# Patient Record
Sex: FEMALE | Race: WHITE | NOT HISPANIC OR LATINO | Employment: OTHER | URBAN - METROPOLITAN AREA
[De-identification: names, ages, dates, MRNs, and addresses within clinical notes are randomized per-mention and may not be internally consistent; named-entity substitution may affect disease eponyms.]

---

## 2017-04-10 ENCOUNTER — HOSPITAL ENCOUNTER (OUTPATIENT)
Dept: MAMMOGRAPHY | Facility: HOSPITAL | Age: 72
Discharge: HOME | End: 2017-04-10
Attending: INTERNAL MEDICINE

## 2017-04-10 DIAGNOSIS — Z12.31 SCREENING MAMMOGRAM FOR HIGH-RISK PATIENT: ICD-10-CM

## 2017-04-27 ENCOUNTER — RECORDS - HEALTHEAST (OUTPATIENT)
Dept: BONE DENSITY | Facility: CLINIC | Age: 72
End: 2017-04-27

## 2017-04-27 ENCOUNTER — RECORDS - HEALTHEAST (OUTPATIENT)
Dept: ADMINISTRATIVE | Facility: OTHER | Age: 72
End: 2017-04-27

## 2017-04-27 DIAGNOSIS — M81.0 AGE-RELATED OSTEOPOROSIS WITHOUT CURRENT PATHOLOGICAL FRACTURE: ICD-10-CM

## 2017-04-28 ENCOUNTER — OFFICE VISIT - HEALTHEAST (OUTPATIENT)
Dept: INTERNAL MEDICINE | Facility: CLINIC | Age: 72
End: 2017-04-28

## 2017-04-28 DIAGNOSIS — M81.0 SENILE OSTEOPOROSIS: ICD-10-CM

## 2017-04-28 RX ORDER — AMOXICILLIN 500 MG/1
CAPSULE ORAL
Refills: 8 | Status: SHIPPED | COMMUNITY
Start: 2017-02-08

## 2017-10-30 ENCOUNTER — AMBULATORY - HEALTHEAST (OUTPATIENT)
Dept: NURSING | Facility: CLINIC | Age: 72
End: 2017-10-30

## 2017-10-30 DIAGNOSIS — M81.0 SENILE OSTEOPOROSIS: ICD-10-CM

## 2018-05-11 ENCOUNTER — OFFICE VISIT - HEALTHEAST (OUTPATIENT)
Dept: INTERNAL MEDICINE | Facility: CLINIC | Age: 73
End: 2018-05-11

## 2018-05-11 DIAGNOSIS — M81.0 SENILE OSTEOPOROSIS: ICD-10-CM

## 2018-05-11 LAB — CALCIUM SERPL-MCNC: 10.3 MG/DL (ref 8.5–10.5)

## 2018-05-14 ENCOUNTER — COMMUNICATION - HEALTHEAST (OUTPATIENT)
Dept: INTERNAL MEDICINE | Facility: CLINIC | Age: 73
End: 2018-05-14

## 2018-05-14 LAB — 25(OH)D3 SERPL-MCNC: 84.4 NG/ML (ref 30–80)

## 2018-08-28 ENCOUNTER — RECORDS - HEALTHEAST (OUTPATIENT)
Dept: LAB | Facility: CLINIC | Age: 73
End: 2018-08-28

## 2018-08-28 LAB
ALBUMIN SERPL-MCNC: 4 G/DL (ref 3.5–5)
ALP SERPL-CCNC: 68 U/L (ref 45–120)
ALT SERPL W P-5'-P-CCNC: 15 U/L (ref 0–45)
ANION GAP SERPL CALCULATED.3IONS-SCNC: 12 MMOL/L (ref 5–18)
AST SERPL W P-5'-P-CCNC: 26 U/L (ref 0–40)
BILIRUB SERPL-MCNC: 0.4 MG/DL (ref 0–1)
BUN SERPL-MCNC: 18 MG/DL (ref 8–28)
CALCIUM SERPL-MCNC: 10.6 MG/DL (ref 8.5–10.5)
CHLORIDE BLD-SCNC: 104 MMOL/L (ref 98–107)
CHOLEST SERPL-MCNC: 194 MG/DL
CO2 SERPL-SCNC: 25 MMOL/L (ref 22–31)
CREAT SERPL-MCNC: 0.8 MG/DL (ref 0.6–1.1)
FASTING STATUS PATIENT QL REPORTED: NO
GFR SERPL CREATININE-BSD FRML MDRD: >60 ML/MIN/1.73M2
GLUCOSE BLD-MCNC: 97 MG/DL (ref 70–125)
HDLC SERPL-MCNC: 55 MG/DL
LDLC SERPL CALC-MCNC: 115 MG/DL
LIPASE SERPL-CCNC: 36 U/L (ref 0–52)
POTASSIUM BLD-SCNC: 4.5 MMOL/L (ref 3.5–5)
PROT SERPL-MCNC: 7.4 G/DL (ref 6–8)
SODIUM SERPL-SCNC: 141 MMOL/L (ref 136–145)
TRIGL SERPL-MCNC: 120 MG/DL

## 2018-10-18 ENCOUNTER — COMMUNICATION - HEALTHEAST (OUTPATIENT)
Dept: TELEHEALTH | Facility: CLINIC | Age: 73
End: 2018-10-18

## 2018-10-18 ENCOUNTER — HOSPITAL ENCOUNTER (OUTPATIENT)
Dept: MAMMOGRAPHY | Facility: CLINIC | Age: 73
Discharge: HOME | End: 2018-10-18

## 2018-10-18 DIAGNOSIS — Z12.31 VISIT FOR SCREENING MAMMOGRAM: ICD-10-CM

## 2018-11-06 ENCOUNTER — COMMUNICATION - HEALTHEAST (OUTPATIENT)
Dept: INTERNAL MEDICINE | Facility: CLINIC | Age: 73
End: 2018-11-06

## 2018-11-12 ENCOUNTER — AMBULATORY - HEALTHEAST (OUTPATIENT)
Dept: NURSING | Facility: CLINIC | Age: 73
End: 2018-11-12

## 2019-05-14 ENCOUNTER — RECORDS - HEALTHEAST (OUTPATIENT)
Dept: BONE DENSITY | Facility: CLINIC | Age: 74
End: 2019-05-14

## 2019-05-14 ENCOUNTER — OFFICE VISIT - HEALTHEAST (OUTPATIENT)
Dept: INTERNAL MEDICINE | Facility: CLINIC | Age: 74
End: 2019-05-14

## 2019-05-14 ENCOUNTER — RECORDS - HEALTHEAST (OUTPATIENT)
Dept: ADMINISTRATIVE | Facility: OTHER | Age: 74
End: 2019-05-14

## 2019-05-14 DIAGNOSIS — M81.0 SENILE OSTEOPOROSIS: ICD-10-CM

## 2019-05-14 DIAGNOSIS — M81.0 AGE-RELATED OSTEOPOROSIS WITHOUT CURRENT PATHOLOGICAL FRACTURE: ICD-10-CM

## 2019-05-14 LAB
ANION GAP SERPL CALCULATED.3IONS-SCNC: 12 MMOL/L (ref 5–18)
BUN SERPL-MCNC: 21 MG/DL (ref 8–28)
CALCIUM SERPL-MCNC: 10.4 MG/DL (ref 8.5–10.5)
CHLORIDE BLD-SCNC: 102 MMOL/L (ref 98–107)
CO2 SERPL-SCNC: 25 MMOL/L (ref 22–31)
CREAT SERPL-MCNC: 0.86 MG/DL (ref 0.6–1.1)
GFR SERPL CREATININE-BSD FRML MDRD: >60 ML/MIN/1.73M2
GLUCOSE BLD-MCNC: 77 MG/DL (ref 70–125)
POTASSIUM BLD-SCNC: 4.1 MMOL/L (ref 3.5–5)
SODIUM SERPL-SCNC: 139 MMOL/L (ref 136–145)

## 2019-05-15 LAB — 25(OH)D3 SERPL-MCNC: 65 NG/ML (ref 30–80)

## 2019-06-28 ENCOUNTER — RECORDS - HEALTHEAST (OUTPATIENT)
Dept: LAB | Facility: CLINIC | Age: 74
End: 2019-06-28

## 2019-07-01 LAB — HEPATITIS B SURFACE ANTIBODY LHE- HISTORICAL: NEGATIVE

## 2019-07-03 ENCOUNTER — COMMUNICATION - HEALTHEAST (OUTPATIENT)
Dept: ADMINISTRATIVE | Facility: CLINIC | Age: 74
End: 2019-07-03

## 2019-11-12 ENCOUNTER — AMBULATORY - HEALTHEAST (OUTPATIENT)
Dept: INTERNAL MEDICINE | Facility: CLINIC | Age: 74
End: 2019-11-12

## 2019-11-12 DIAGNOSIS — Z92.29 PERSONAL HISTORY OF OTHER DRUG THERAPY: ICD-10-CM

## 2019-11-12 DIAGNOSIS — M81.0 SENILE OSTEOPOROSIS: ICD-10-CM

## 2019-11-14 ENCOUNTER — AMBULATORY - HEALTHEAST (OUTPATIENT)
Dept: NURSING | Facility: CLINIC | Age: 74
End: 2019-11-14

## 2020-01-23 ENCOUNTER — RECORDS - HEALTHEAST (OUTPATIENT)
Dept: ADMINISTRATIVE | Facility: OTHER | Age: 75
End: 2020-01-23

## 2020-01-26 ENCOUNTER — HOSPITAL ENCOUNTER (OUTPATIENT)
Dept: MRI IMAGING | Facility: CLINIC | Age: 75
Discharge: HOME | End: 2020-01-26
Attending: INTERNAL MEDICINE

## 2020-01-26 DIAGNOSIS — R10.84 GENERALIZED ABDOMINAL PAIN: ICD-10-CM

## 2020-01-26 LAB
CREAT BLD-MCNC: 1 MG/DL (ref 0.6–1.1)
GFR SERPL CREATININE-BSD FRML MDRD: 54 ML/MIN/1.73M2

## 2020-05-15 ENCOUNTER — AMBULATORY - HEALTHEAST (OUTPATIENT)
Dept: NURSING | Facility: CLINIC | Age: 75
End: 2020-05-15

## 2020-11-13 ENCOUNTER — AMBULATORY - HEALTHEAST (OUTPATIENT)
Dept: INTERNAL MEDICINE | Facility: CLINIC | Age: 75
End: 2020-11-13

## 2020-11-17 ENCOUNTER — OFFICE VISIT - HEALTHEAST (OUTPATIENT)
Dept: INTERNAL MEDICINE | Facility: CLINIC | Age: 75
End: 2020-11-17

## 2020-11-17 DIAGNOSIS — M81.0 SENILE OSTEOPOROSIS: ICD-10-CM

## 2020-11-17 DIAGNOSIS — M65.30 TRIGGER FINGER, ACQUIRED: ICD-10-CM

## 2020-11-17 RX ORDER — CITALOPRAM HYDROBROMIDE 20 MG/1
20 TABLET ORAL DAILY
Status: SHIPPED | COMMUNITY
Start: 2020-09-29

## 2020-11-17 RX ORDER — LORAZEPAM 1 MG/1
1 TABLET ORAL EVERY 6 HOURS PRN
Status: SHIPPED | COMMUNITY
Start: 2020-11-17 | End: 2022-11-22

## 2021-01-19 ENCOUNTER — RECORDS - HEALTHEAST (OUTPATIENT)
Dept: LAB | Facility: CLINIC | Age: 76
End: 2021-01-19

## 2021-01-19 LAB
ALBUMIN SERPL-MCNC: 4 G/DL (ref 3.5–5)
ALP SERPL-CCNC: 66 U/L (ref 45–120)
ALT SERPL W P-5'-P-CCNC: 17 U/L (ref 0–45)
ANION GAP SERPL CALCULATED.3IONS-SCNC: 9 MMOL/L (ref 5–18)
AST SERPL W P-5'-P-CCNC: 25 U/L (ref 0–40)
BILIRUB SERPL-MCNC: 0.4 MG/DL (ref 0–1)
BUN SERPL-MCNC: 18 MG/DL (ref 8–28)
CALCIUM SERPL-MCNC: 9.5 MG/DL (ref 8.5–10.5)
CHLORIDE BLD-SCNC: 104 MMOL/L (ref 98–107)
CHOLEST SERPL-MCNC: 184 MG/DL
CO2 SERPL-SCNC: 29 MMOL/L (ref 22–31)
CREAT SERPL-MCNC: 0.92 MG/DL (ref 0.6–1.1)
FASTING STATUS PATIENT QL REPORTED: YES
GFR SERPL CREATININE-BSD FRML MDRD: 60 ML/MIN/1.73M2
GLUCOSE BLD-MCNC: 97 MG/DL (ref 70–125)
HDLC SERPL-MCNC: 59 MG/DL
LDLC SERPL CALC-MCNC: 107 MG/DL
POTASSIUM BLD-SCNC: 4.6 MMOL/L (ref 3.5–5)
PROT SERPL-MCNC: 6.9 G/DL (ref 6–8)
SODIUM SERPL-SCNC: 142 MMOL/L (ref 136–145)
TRIGL SERPL-MCNC: 91 MG/DL

## 2021-05-17 ENCOUNTER — AMBULATORY - HEALTHEAST (OUTPATIENT)
Dept: NURSING | Facility: CLINIC | Age: 76
End: 2021-05-17

## 2021-05-17 ENCOUNTER — RECORDS - HEALTHEAST (OUTPATIENT)
Dept: BONE DENSITY | Facility: CLINIC | Age: 76
End: 2021-05-17

## 2021-05-17 ENCOUNTER — RECORDS - HEALTHEAST (OUTPATIENT)
Dept: ADMINISTRATIVE | Facility: OTHER | Age: 76
End: 2021-05-17

## 2021-05-17 DIAGNOSIS — M81.0 AGE-RELATED OSTEOPOROSIS WITHOUT CURRENT PATHOLOGICAL FRACTURE: ICD-10-CM

## 2021-05-28 NOTE — PATIENT INSTRUCTIONS - HE
Prolia 11th today.  Prolia 12th in 6 months. I will see you in 1 year.    DXA in 2 years .   Phone number to schedule [phone].    Daily calcium need is 4933-5113 mg a day from the diet and supplements.  Calcium citrate is easier to digest.  Vitamin D 2000 IU daily recommended.

## 2021-05-28 NOTE — PROGRESS NOTES
1. Osteoporosis Senile  Vitamin D, Total (25-Hydroxy)    Basic Metabolic Panel     Patient was educated on safety of Prolia utilizing Patient Counseling Chart for Healthcare Providers, as outlined by the Prolia REMS progam.     Return in about 6 months (around 11/14/2019) for Recheck, Prolia injection.    Patient Instructions   Prolia 11th today.  Prolia 12th in 6 months. I will see you in 1 year.    DXA in 2 years .   Phone number to schedule [phone].    Daily calcium need is 9749-3400 mg a day from the diet and supplements.  Calcium citrate is easier to digest.  Vitamin D 2000 IU daily recommended.      Chief Complaint   Patient presents with     Osteoporosis Follow Up       /70   Pulse 72   Wt 138 lb 8 oz (62.8 kg)   BMI 22.70 kg/m        Did you experience any problems with previous Prolia injection? no  Any medication change in the last 6 months? no  Did you take prednisone or other immunosupressant drugs in the last 6 months   (chemo, transplant, rheum, dermatology conditions)? no  Did you have any serious infection in the last 6 months?no  Any recent hospitalizations?no  Do you plan any dental work in the next 2-3 months?no  How much calcium do you take daily from the diet and supplements?1200 mg  How much vit D do you take daily? 2000 IU  Last DXA? Done today,  Reviewed and discussed      Patient is here today for the 11th Prolia injection. Patient tolerated previous injections well.   We discussed calcium and vit D daily needs today.   Next Prolia injection will be in 6 months.     15 minutes spent with the patient and more then 50 % of the time in counseling.  This note has been dictated using voice recognition software. Any grammatical or context distortions are unintentional and inherent to the software      Patient Active Problem List   Diagnosis     Benign Essential Hypertension     Osteoporosis Senile     Osteoarthritis of right knee     Depression     Anxiety     GERD  (gastroesophageal reflux disease)       Current Outpatient Medications on File Prior to Visit   Medication Sig Dispense Refill     acetaminophen (TYLENOL) 500 MG tablet Take 1,000 mg by mouth every 6 (six) hours as needed for pain.        amoxicillin (AMOXIL) 500 MG capsule TAKE 4 CAPSULES BY MOUTH 1 HOUR BEFORE DENTAL APPOINTMENT.  8     CALCIUM CITRATE/VITAMIN D3 (CALCIUM CITRATE + D ORAL) Take by mouth. Calcium 630 mg and Vit D3-500 IU. Two tabs a day       chlorpheniramine-phenylephrine 4-10 mg per tablet Take 1 tablet by mouth every 6 (six) hours as needed for congestion.       cholecalciferol, vitamin D3, (VITAMIN D3) 2,000 unit Tab Take 4,000 Units by mouth daily.        GLUCOSAM SUL NA/CHONDR JOSHUA A NA (GLUCOSAMINE-CHONDROITIN 3X ORAL) Take 1 tablet by mouth daily.        multivitamin (MULTIVITAMIN) per tablet Take 1 tablet by mouth daily.        [DISCONTINUED] LORazepam (ATIVAN) 1 MG tablet        [DISCONTINUED] omeprazole (PRILOSEC) 20 MG capsule Take 20 mg by mouth daily.        [DISCONTINUED] PARoxetine (PAXIL) 30 MG tablet        Current Facility-Administered Medications on File Prior to Visit   Medication Dose Route Frequency Provider Last Rate Last Dose     denosumab 60 mg (PROLIA 60 mg/ml)  60 mg Subcutaneous Q6 Months Melanie Crisostomo MD   60 mg at 04/26/16 0942     [DISCONTINUED] denosumab 60 mg (PROLIA 60 mg/ml)  60 mg Subcutaneous Q6 Months Timmy Ndiaye MD   60 mg at 11/12/18 0958

## 2021-05-29 ENCOUNTER — RECORDS - HEALTHEAST (OUTPATIENT)
Dept: ADMINISTRATIVE | Facility: CLINIC | Age: 76
End: 2021-05-29

## 2021-05-30 ENCOUNTER — RECORDS - HEALTHEAST (OUTPATIENT)
Dept: ADMINISTRATIVE | Facility: CLINIC | Age: 76
End: 2021-05-30

## 2021-05-30 VITALS — BODY MASS INDEX: 22.29 KG/M2 | WEIGHT: 136 LBS

## 2021-05-30 NOTE — TELEPHONE ENCOUNTER
Patient would like to apply for the Prolia assistance program.  She was on it and then her insurance changed to [employer] and they told her she had to reapply.

## 2021-05-30 NOTE — TELEPHONE ENCOUNTER
Called patient and informed her that medicare dose not qualify for the co-pay program. Patient understood.       Sissy Woodward CMA  4:37 PM  7/3/2019

## 2021-05-31 ENCOUNTER — RECORDS - HEALTHEAST (OUTPATIENT)
Dept: ADMINISTRATIVE | Facility: CLINIC | Age: 76
End: 2021-05-31

## 2021-06-01 ENCOUNTER — RECORDS - HEALTHEAST (OUTPATIENT)
Dept: ADMINISTRATIVE | Facility: CLINIC | Age: 76
End: 2021-06-01

## 2021-06-01 VITALS — WEIGHT: 138.6 LBS | BODY MASS INDEX: 22.71 KG/M2

## 2021-06-02 ENCOUNTER — RECORDS - HEALTHEAST (OUTPATIENT)
Dept: ADMINISTRATIVE | Facility: CLINIC | Age: 76
End: 2021-06-02

## 2021-06-03 VITALS — BODY MASS INDEX: 22.7 KG/M2 | WEIGHT: 138.5 LBS

## 2021-06-03 NOTE — PROGRESS NOTES
"Prolia Injection Phone Screen      Screening questions have been asked 2-3 days prior to administration visit for Prolia. If any questions are answered with \"Yes,\" this phone encounter were will routed to ordering provider for further evaluation.     1.  When was the last injection?  5/14/19    2.  Has insurance for this injection been verified?  Yes    3.  Did you experience any new onset achiness or rashes that lasted for over a month with your previous Prolia injection?   No    4.  Do you have a fever over 101?F or a new deep cough that is unusual for you today? No    5.  Have you started any new medications in the last 6 months that you were told could affect your immune system? These may have been prescribed by oncologist, transplant, rheumatology, or dermatology.   No    6.  In the last 6 months have you have gastric bypass or parathyroid surgery?   No    7.  Do you plan dental work requiring drilling into the bone such as implants/extractions or oral surgery in the next 2-3 months?   No    8. Do you have new insurance since the last injection? No    Patient informed if symptoms discussed above present prior to their administration appointment, they are to notify clinic immediately.     Lissa Puri              "

## 2021-06-05 VITALS
WEIGHT: 140.7 LBS | OXYGEN SATURATION: 97 % | SYSTOLIC BLOOD PRESSURE: 142 MMHG | DIASTOLIC BLOOD PRESSURE: 79 MMHG | HEART RATE: 99 BPM | BODY MASS INDEX: 23.06 KG/M2

## 2021-06-08 NOTE — PROGRESS NOTES
"Prolia Injection Phone Screen      Screening questions have been asked 2-3 days prior to administration visit for Prolia. If any questions are answered with \"Yes,\" this phone encounter were will routed to ordering provider for further evaluation.     1.  When was the last injection?  11/14/19    2.  Has insurance for this injection been verified?  Yes    3.  Did you experience any new onset achiness or rashes that lasted for over a month with your previous Prolia injection?   No    4.  Do you have a fever over 101?F or a new deep cough that is unusual for you today? No    5.  Have you started any new medications in the last 6 months that you were told could affect your immune system? These may have been prescribed by oncologist, transplant, rheumatology, or dermatology.   No    6.  In the last 6 months have you have gastric bypass or parathyroid surgery?   No    7.  Do you plan dental work requiring drilling into the bone such as implants/extractions or oral surgery in the next 2-3 months?   No    8. Do you have new insurance since the last injection?    Patient informed if symptoms discussed above present prior to their administration appointment, they are to notify clinic immediately.     Lissa Puri    The following steps were completed to comply with the REMS program for Prolia:  1. Ordering provider has previously reviewed information in the Medication Guide and Patient Counseling Chart, including the serious risks of Prolia  and the symptoms of each risk and have been advised to seek prompt medical attention if they have signs or symptoms of any of the serious risks.  2. Provided each patient a copy of the Medication Guide and Patient Brochure.  See MAR for administration details.   Indication: Prolia  (denosumab) is a prescription medicine used to treat osteoporosis in patients who:   Are at high risk for fracture, meaning patients who have had a fracture related to osteoporosis, or who have multiple risk " factors for fracture; Cannot use another osteoporosis medicine or other osteoporosis medicines did not work well.   The timeline for early/late injections would be 4 weeks early and any time after the 6 month christian. If a patient receives their injection late, then the subsequent injection would be 6 months from the date that they actually received the injection    Have the screening questions been asked prior to this administration? Yes    Patient consents to receive outdoor care: Yes    Upon arrival, patient instructed to proceed to designated location, place vehicle in park, turn off, and remove keys  and Patient receiving an immunization or injection. Instructed patient to notify healthcare personnel if they are having an adverse reaction.    If we are unable to safely and ergonomically able to provide care- is the patient able to safely able to get out of car and transfer to a chair? Yes    Patient would like to receive their AVS not needed ..

## 2021-06-10 NOTE — PROGRESS NOTES
1. Osteoporosis Senile  denosumab 60 mg (PROLIA 60 mg/ml)       Return in about 6 months (around 10/28/2017) for Recheck.    Patient Instructions   Prolia 7th today.  Prolia 8th in 6 months with my nurse. I will see you in 1 year.  DXA in 2 years.   Phone number to schedule [phone].  Please avoid any extensive dental work as implants and teeth extractions for the next 1-2 months.  Daily calcium need is 1316-8771 mg a day from the diet and supplements.  Calcium citrate is easier to digest.  Vitamin D 2000 IU daily recommended.      Chief Complaint   Patient presents with     Osteoporosis Follow Up       /70  Pulse 88  Wt 136 lb (61.7 kg)  BMI 22.29 kg/m2      Did you experience any problems with previous Prolia injection? no  Any medication change in the last 6 months? no  Did you take prednisone or other immunosupressant drugs in the last 6 months   (chemo, transplant, rheum, dermatology conditions)? no  Did you have any serious infection in the last 6 months?no  Any recent hospitalizations?no  Do you plan any dental work in the next 2-3 months?no  How much calcium do you take daily from the diet and supplements?1200 mg  How much vit D do you take daily? 2000 IU  Last DXA? 4/28/17      Patient is here today for the 7th Prolia injection. Patient tolerated previous injections well.   We discussed calcium and vit D daily needs today.   Next Prolia injection will be in 6 months.     15 minutes spent with the patient and more then 50 % of the time in counseling.  This note has been dictated using voice recognition software. Any grammatical or context distortions are unintentional and inherent to the software      Patient Active Problem List   Diagnosis     Benign Essential Hypertension     Osteoporosis Senile     Osteoarthritis of right knee     Depression     Anxiety     GERD (gastroesophageal reflux disease)       Current Outpatient Prescriptions on File Prior to Visit   Medication Sig Dispense Refill      acetaminophen (TYLENOL) 500 MG tablet Take 1,000 mg by mouth every 6 (six) hours as needed for pain.        chlorpheniramine-phenylephrine 4-10 mg per tablet Take 1 tablet by mouth every 6 (six) hours as needed for congestion.       cholecalciferol, vitamin D3, (VITAMIN D3) 2,000 unit Tab Take 4,000 Units by mouth daily.        GLUCOSAM SUL NA/CHONDR JOSHUA A NA (GLUCOSAMINE-CHONDROITIN 3X ORAL) Take 1 tablet by mouth daily.        LORazepam (ATIVAN) 1 MG tablet        multivitamin (MULTIVITAMIN) per tablet Take 1 tablet by mouth daily.        omeprazole (PRILOSEC) 20 MG capsule Take 20 mg by mouth daily.        PARoxetine (PAXIL) 30 MG tablet        Current Facility-Administered Medications on File Prior to Visit   Medication Dose Route Frequency Provider Last Rate Last Dose     denosumab 60 mg (PROLIA 60 mg/ml)  60 mg Subcutaneous Q6 Months Melanie Crisostomo MD   60 mg at 04/26/16 0942

## 2021-06-13 NOTE — PROGRESS NOTES
Chief Complaint   Patient presents with     Prolia #8     1. Did you experience any problems with previous Prolia injection? NO  2. Do you feel sick today?(fever, RS, GI,  issues)? NO  3. Any medication changes in the last 6 months? NO  4. Did you take prednisone or other immunosuppressant drugs in the last 6 months?(chemo, transplant, rheu, dermatology conditions)? NO  5. Did you have any serious infection in the last 6 months? (pancreatitis) NO  6. Any recent hospitalizations/ surgeries (especially gastric bypass, thyroid, parathyroid)? NO  7. Do you plan any dental work?(especially implants and extractions) in the next 2-3 months? NO  8. Did you have any fractures in the last year? NO    Yearly F/U appointment  with Dr. Ndiaye is made.     Shasha Patel CMA [facility] 10/30/2017 8:32 AM

## 2021-06-13 NOTE — PROGRESS NOTES
"  1. Osteoporosis Senile  DXA Bone Density Scan   2. Trigger finger, acquired       Patient was educated on safety of Prolia utilizing Patient Counseling Chart for Healthcare Providers, as outlined by the Prolia REMS progam.     Return in about 6 months (around 5/17/2021) for Recheck.    Patient Instructions   Prolia 14th today.  Prolia 15th in 6 months with my nurse.I will see you in 1 year.    DXA in 5/2021 .   Phone number to schedule [phone].    Daily calcium need is 4577-2346 mg a day from the diet and supplements.  Calcium citrate is easier to digest.  Vitamin D 2000 IU daily recommended.      Chief Complaint   Patient presents with     Osteoporosis Follow Up     Osteo F/U       /79   Pulse 99   Wt 140 lb 11.2 oz (63.8 kg)   SpO2 97%   BMI 23.06 kg/m        Did you experience any problems with previous Prolia injection? no  Any medication change in the last 6 months? no  Did you take prednisone or other immunosupressant drugs in the last 6 months   (chemo, transplant, rheum, dermatology conditions)? no  Did you have any serious infection in the last 6 months?no  Any recent hospitalizations?no  Do you plan any dental work in the next 2-3 months?no  How much calcium do you take daily from the diet and supplements?1200 mg  How much vit D do you take daily? 2000 IU  Last DXA? 5/2019, Reviewed and discussed      Patient is here today for the 14th Prolia injection. Patient tolerated previous injections well.   We discussed calcium and vit D daily needs today.  I reviewed her labs from 5/2019.  She also asked about swelling and tightness in her right hand, mostly in her fingers with frequent catching when flex. On the exam, there is no redness and swelling comparing to left hand, but has [place] nodules bilat.   We discussed trigger finger symptoms and treatment. Since it is not \"bad\", she will wait before discuss it with Hand specialist.  Next Prolia injection will be in 6 months.     Patient was " educated on safety of Prolia utilizing Patient Counseling Chart for Healthcare Providers, as outlined by the Prolia REMS progam.     25 minutes spent with the patient and more then 50 % of the time in counseling about osteoporosis, available osteoporosis treatment options, medication side effects and contraindications, supplement use and weightbearing exercise.    This note has been dictated using voice recognition software. Any grammatical or context distortions are unintentional and inherent to the software      Patient Active Problem List   Diagnosis     Benign Essential Hypertension     Osteoporosis Senile     Osteoarthritis of right knee     Depression     Anxiety     GERD (gastroesophageal reflux disease)       Current Outpatient Medications on File Prior to Visit   Medication Sig Dispense Refill     acetaminophen (TYLENOL) 500 MG tablet Take 1,000 mg by mouth every 6 (six) hours as needed for pain.        amoxicillin (AMOXIL) 500 MG capsule TAKE 4 CAPSULES BY MOUTH 1 HOUR BEFORE DENTAL APPOINTMENT.  8     CALCIUM CITRATE/VITAMIN D3 (CALCIUM CITRATE + D ORAL) Take by mouth. Calcium 630 mg and Vit D3-500 IU. Two tabs a day       chlorpheniramine-phenylephrine 4-10 mg per tablet Take 1 tablet by mouth every 6 (six) hours as needed for congestion.       cholecalciferol, vitamin D3, (VITAMIN D3) 2,000 unit Tab Take 4,000 Units by mouth daily.        citalopram (CELEXA) 20 MG tablet Take 20 mg by mouth daily.       LORazepam (ATIVAN) 1 MG tablet Take 1 mg by mouth every 6 (six) hours as needed for anxiety.       multivitamin (MULTIVITAMIN) per tablet Take 1 tablet by mouth daily.        omeprazole (PRILOSEC) 20 MG capsule Take 20 mg by mouth daily before breakfast.       GLUCOSAM SUL NA/CHONDR JOSHUA A NA (GLUCOSAMINE-CHONDROITIN 3X ORAL) Take 1 tablet by mouth daily.        Current Facility-Administered Medications on File Prior to Visit   Medication Dose Route Frequency Provider Last Rate Last Dose     denosumab 60  mg (PROLIA 60 mg/ml)  60 mg Subcutaneous Q6 Months Melanie Crisostomo MD   60 mg at 05/14/19 1613     denosumab 60 mg (PROLIA 60 mg/ml)  60 mg Subcutaneous Q6 Months Timmy Ndiaye MD   60 mg at 05/15/20 1259

## 2021-06-13 NOTE — PATIENT INSTRUCTIONS - HE
Prolia 14th today.  Prolia 15th in 6 months with my nurse.I will see you in 1 year.    DXA in 5/2021 .   Phone number to schedule [phone].    Daily calcium need is 5962-9250 mg a day from the diet and supplements.  Calcium citrate is easier to digest.  Vitamin D 2000 IU daily recommended.

## 2021-06-18 NOTE — PROGRESS NOTES
1. Osteoporosis Senile  Calcium    Vitamin D, Total (25-Hydroxy)    DXA Bone Density Scan       Return in about 6 months (around 11/11/2018) for Recheck.    Patient Instructions   Prolia 9th today.  Prolia 10th in 6 months with my nurse. I will see you in 1 year.    DXA in 5/2019 .   Phone number to schedule [phone].    Daily calcium need is 6830-3577 mg a day from the diet and supplements.  Calcium citrate is easier to digest.  Vitamin D 2000 IU daily recommended.      Chief Complaint   Patient presents with     Osteoporosis Follow Up       /68  Pulse 72  Wt 138 lb 9.6 oz (62.9 kg)  BMI 22.71 kg/m2      Did you experience any problems with previous Prolia injection? no  Any medication change in the last 6 months? no  Did you take prednisone or other immunosupressant drugs in the last 6 months   (chemo, transplant, rheum, dermatology conditions)? no  Did you have any serious infection in the last 6 months?no  Any recent hospitalizations?no  Do you plan any dental work in the next 2-3 months?no  How much calcium do you take daily from the diet and supplements?1200 mg - 1500 mg  How much vit D do you take daily? 4000 IU  Last DXA? 4/2017      Patient is here today for the 9th Prolia injection. Patient tolerated previous injections well.   We discussed calcium and vit D daily needs today.   Next Prolia injection will be in 6 months.     15 minutes spent with the patient and more then 50 % of the time in counseling.  This note has been dictated using voice recognition software. Any grammatical or context distortions are unintentional and inherent to the software      Patient Active Problem List   Diagnosis     Benign Essential Hypertension     Osteoporosis Senile     Osteoarthritis of right knee     Depression     Anxiety     GERD (gastroesophageal reflux disease)       Current Outpatient Prescriptions on File Prior to Visit   Medication Sig Dispense Refill     acetaminophen (TYLENOL) 500 MG tablet Take  1,000 mg by mouth every 6 (six) hours as needed for pain.        amoxicillin (AMOXIL) 500 MG capsule TAKE 4 CAPSULES BY MOUTH 1 HOUR BEFORE DENTAL APPOINTMENT.  8     CALCIUM CITRATE/VITAMIN D3 (CALCIUM CITRATE + D ORAL) Take by mouth. Calcium 630 mg and Vit D3-500 IU. Two tabs a day       chlorpheniramine-phenylephrine 4-10 mg per tablet Take 1 tablet by mouth every 6 (six) hours as needed for congestion.       cholecalciferol, vitamin D3, (VITAMIN D3) 2,000 unit Tab Take 4,000 Units by mouth daily.        GLUCOSAM SUL NA/CHONDR JOSHUA A NA (GLUCOSAMINE-CHONDROITIN 3X ORAL) Take 1 tablet by mouth daily.        LORazepam (ATIVAN) 1 MG tablet        multivitamin (MULTIVITAMIN) per tablet Take 1 tablet by mouth daily.        omeprazole (PRILOSEC) 20 MG capsule Take 20 mg by mouth daily.        PARoxetine (PAXIL) 30 MG tablet        Current Facility-Administered Medications on File Prior to Visit   Medication Dose Route Frequency Provider Last Rate Last Dose     denosumab 60 mg (PROLIA 60 mg/ml)  60 mg Subcutaneous Q6 Months Melanie Crisostomo MD   60 mg at 04/26/16 0955

## 2021-06-21 NOTE — PROGRESS NOTES
The following steps were completed to comply with the REMS program for Prolia:  1. Ordering provider has previously reviewed information in the Medication Guide and Patient Counseling Chart, including the serious risks of Prolia  and the symptoms of each risk and have been advised to seek prompt medical attention if they have signs or symptoms of any of the serious risks.  2. Provided each patient a copy of the Medication Guide and Patient Brochure.  See MAR for administration details.   Indication: Prolia  (denosumab) is a prescription medicine used to treat osteoporosis in patients who:   Are at high risk for fracture, meaning patients who have had a fracture related to osteoporosis, or who have multiple risk factors for fracture; Cannot use another osteoporosis medicine or other osteoporosis medicines did not work well.   The timeline for early/late injections would be 4 weeks early and any time after the 6 month christian. If a patient receives their injection late, then the subsequent injection would be 6 months from the date that they actually received the injection    Have the screening questions been asked prior to this administration? Yes

## 2021-07-03 NOTE — ADDENDUM NOTE
Addendum Note by Bloch, Lisa M, CMA at 5/11/2018  5:10 PM     Author: Bloch, Lisa M, CMA Service: -- Author Type: Certified Medical Assistant    Filed: 5/11/2018  5:10 PM Encounter Date: 5/11/2018 Status: Signed    : Bloch, Lisa M, CMA (Certified Medical Assistant)    Addended by: BLOCH, LISA M on: 5/11/2018 05:10 PM        Modules accepted: Orders

## 2021-07-21 ENCOUNTER — RECORDS - HEALTHEAST (OUTPATIENT)
Dept: ADMINISTRATIVE | Facility: CLINIC | Age: 76
End: 2021-07-21

## 2021-11-05 DIAGNOSIS — Z92.29 PERSONAL HISTORY OF OTHER DRUG THERAPY: ICD-10-CM

## 2021-11-05 DIAGNOSIS — M81.0 SENILE OSTEOPOROSIS: Primary | ICD-10-CM

## 2021-11-15 NOTE — PROGRESS NOTES
"Prolia Injection Phone Screen      Screening questions have been asked 2-3 days prior to administration visit for Prolia. If any questions are answered with \"Yes,\" this phone encounter were will routed to ordering provider for further evaluation.     1.  When was the last injection?  11/17/2020    2.  Has insurance for this injection been verified?  Yes    3.  Did you experience any new onset achiness or rashes that lasted for over a month with your previous Prolia injection?   No    4.  Do you have a fever over 101?F or a new deep cough that is unusual for you today? No    5.  Have you started any new medications in the last 6 months that you were told could affect your immune system? These may have been prescribed by oncologist, transplant, rheumatology, or dermatology.   No    6.  In the last 6 months have you have gastric bypass or parathyroid surgery?   No    7.  Do you plan dental work requiring drilling into the bone such as implants/extractions or oral surgery in the next 2-3 months?   No    8. Do you have new insurance since the last injection?    9. Have you received the Covid-19 vaccine? Yes  If No - Proceed with Prolia injection  If Yes - Date of vaccination 02/25/2021 and 01/28/2021. Will need to wait until 2 weeks after 2nd dose of Covid-19 vaccine before administering Prolia       Patient informed if symptoms discussed above present prior to their administration appointment, they are to notify clinic immediately.     Marysol Boyd        The following steps were completed to comply with the REMS program for Prolia:  1. Ordering provider has previously reviewed information in the Medication Guide and Patient Counseling Chart, including the serious risks of Prolia  and the symptoms of each risk and have been advised to seek prompt medical attention if they have signs or symptoms of any of the serious risks.  2. Provided each patient a copy of the Medication Guide and Patient Brochure.  See MAR for " [facility]  [address] [place] [address] Drive, [address] [place] [address]  Phone [phone]  Fax [phone]             Re:  Christian Lovett    11/15/21  :  1979  [facility] [address]       Replinishible PAP Supplies, 1 year supply  Item HPCPS Code Frequency   Mask of choice  or  1 per 3 months   Nasal Mask cushion/pillows  or  2 per 30 days   Full Face Mask Interface  1 per 30 days   Headgear  1 per 6 months   Tubing, length of choice  or  1 per 3 months   Water Chamber  1 per 6 months   Chinstrap  1 per 6 months   Disposable Filters  2 per 30 days   Reusable Filters  1 per 6 months     Diagnoses:  Obstructive sleep apnea (G47.33)  Length of Need: Lifetime, 99    Ordering Provider: Scot Eden PA-C  NPI:  [id number]        Signature: [unfilled]        Date: 11/15/2021      Electronically Signed by Scot Eden PA-C  on 11/15/2021 at 4:06 PM administration details.   Indication: Prolia  (denosumab) is a prescription medicine used to treat osteoporosis in patients who:   Are at high risk for fracture, meaning patients who have had a fracture related to osteoporosis, or who have multiple risk factors for fracture; Cannot use another osteoporosis medicine or other osteoporosis medicines did not work well.   The timeline for early/late injections would be 4 weeks early and any time after the 6 month christian. If a patient receives their injection late, then the subsequent injection would be 6 months from the date that they actually received the injection    Have the screening questions been asked prior to this administration? Yes

## 2021-11-18 ENCOUNTER — OFFICE VISIT (OUTPATIENT)
Dept: INTERNAL MEDICINE | Facility: CLINIC | Age: 76
End: 2021-11-18
Payer: COMMERCIAL

## 2021-11-18 VITALS
WEIGHT: 139.2 LBS | OXYGEN SATURATION: 97 % | HEART RATE: 109 BPM | SYSTOLIC BLOOD PRESSURE: 141 MMHG | BODY MASS INDEX: 22.81 KG/M2 | DIASTOLIC BLOOD PRESSURE: 79 MMHG

## 2021-11-18 DIAGNOSIS — M81.0 SENILE OSTEOPOROSIS: Primary | ICD-10-CM

## 2021-11-18 DIAGNOSIS — K21.00 GASTROESOPHAGEAL REFLUX DISEASE WITH ESOPHAGITIS WITHOUT HEMORRHAGE: ICD-10-CM

## 2021-11-18 PROCEDURE — 99214 OFFICE O/P EST MOD 30 MIN: CPT | Mod: 25 | Performed by: INTERNAL MEDICINE

## 2021-11-18 PROCEDURE — 96372 THER/PROPH/DIAG INJ SC/IM: CPT | Performed by: INTERNAL MEDICINE

## 2021-11-18 NOTE — PATIENT INSTRUCTIONS
Prolia 16th today.  Prolia 17th in 6 months with my nurse. I will see you in 1 year.    DXA in 5/2023 .   Phone number to schedule [phone].    Daily calcium need is 4798-7462 mg a day from the diet and supplements.  Calcium citrate is easier to digest.  Vitamin D 2000 IU daily recommended.

## 2021-11-18 NOTE — PROGRESS NOTES
(M81.0) Osteoporosis Senile  (primary encounter diagnosis)      (K21.00) Gastroesophageal reflux disease with esophagitis without hemorrhage  On omeprazole daily. PPI use and calcium absorption discussed with the patient.    Patient was educated on safety of Prolia utilizing Patient Counseling Chart for Healthcare Providers, as outlined by the Prolia REMS progam.     Return in about 6 months (around 5/18/2022) for Prolia with CSS.    Patient Instructions   Prolia 16th today.  Prolia 17th in 6 months with my nurse. I will see you in 1 year.    DXA in 5/2023 .   Phone number to schedule [phone].    Daily calcium need is 3115-7987 mg a day from the diet and supplements.  Calcium citrate is easier to digest.  Vitamin D 2000 IU daily recommended.          BP (!) 141/79   Pulse 109   Wt 63.1 kg (139 lb 3.2 oz)   SpO2 97%   BMI 22.81 kg/m        Did you experience any problems with previous Prolia injection? no  Any medication change in the last 6 months? no  Did you take prednisone or other immunosupressant drugs in the last 6 months   (chemo, transplant, rheum, dermatology conditions)? no  Did you have any serious infection in the last 6 months?no  Any recent hospitalizations?no  Do you plan any dental work in the next 2-3 months?no  How much calcium do you take daily from the diet and supplements?1200 mg  How much vit D do you take daily? 2000 IU  Last DXA? 5/2021,  Reviewed and discussed      Patient is here today for the 16th Prolia injection. Patient tolerated previous injections well.   We discussed calcium and vit D daily needs today.   I reviewed the lab results:  Component      Latest Ref Rng & Units 1/19/2021   Sodium      136 - 145 mmol/L 142   Potassium      3.5 - 5.0 mmol/L 4.6   Chloride      98 - 107 mmol/L 104   Carbon Dioxide      22 - 31 mmol/L 29   Anion Gap      5 - 18 mmol/L 9   Glucose      70 - 125 mg/dL 97   Urea Nitrogen      8 - 28 mg/dL 18   Creatinine      0.60 - 1.10 mg/dL 0.92    GFR Estimate If Black      >60 mL/min/1.73m2 >60   GFR Estimate      >60 mL/min/1.73m2 60 (L)   Bilirubin Total      0.0 - 1.0 mg/dL 0.4   Calcium      8.5 - 10.5 mg/dL 9.5   Protein Total      6.0 - 8.0 g/dL 6.9   Albumin      3.5 - 5.0 g/dL 4.0   Alkaline Phosphatase      45 - 120 U/L 66   AST      0 - 40 U/L 25   ALT      0 - 45 U/L 17     Component      Latest Ref Rng & Units 5/14/2019   Vitamin D, Total (25-Hydroxy)      30.0 - 80.0 ng/mL 65.0       We discussed high risk of rebound vertebral fractures when Prolia suddenly stopped.    Next Prolia injection will be in 6 months.           This note has been dictated using voice recognition software. Any grammatical or context distortions are unintentional and inherent to the software      Patient Active Problem List   Diagnosis     Benign Essential Hypertension     Osteoporosis Senile     Osteoarthritis of right knee     Depression     Anxiety     GERD (gastroesophageal reflux disease)       Current Outpatient Medications   Medication     acetaminophen (TYLENOL) 500 MG tablet     amoxicillin (AMOXIL) 500 MG capsule     CALCIUM CITRATE/VITAMIN D3 (CALCIUM CITRATE + D ORAL)     chlorpheniramine-phenylephrine 4-10 mg per tablet     cholecalciferol, vitamin D3, (VITAMIN D3) 2,000 unit Tab     citalopram (CELEXA) 20 MG tablet     LORazepam (ATIVAN) 1 MG tablet     multivitamin (MULTIVITAMIN) per tablet     omeprazole (PRILOSEC) 20 MG capsule     Current Facility-Administered Medications   Medication     [START ON 11/19/2021] denosumab (PROLIA) injection 60 mg

## 2021-11-29 ENCOUNTER — IMMUNIZATION (OUTPATIENT)
Dept: NURSING | Facility: CLINIC | Age: 76
End: 2021-11-29
Payer: COMMERCIAL

## 2021-11-29 PROCEDURE — 91306 COVID-19,PF,MODERNA (18+ YRS BOOSTER .25ML): CPT

## 2021-11-29 PROCEDURE — 0064A COVID-19,PF,MODERNA (18+ YRS BOOSTER .25ML): CPT

## 2022-03-07 ENCOUNTER — HOSPITAL ENCOUNTER (EMERGENCY)
Facility: CLINIC | Age: 77
Discharge: HOME | End: 2022-03-07
Attending: EMERGENCY MEDICINE | Admitting: EMERGENCY MEDICINE
Payer: COMMERCIAL

## 2022-03-07 ENCOUNTER — APPOINTMENT (OUTPATIENT)
Dept: RADIOLOGY | Facility: CLINIC | Age: 77
End: 2022-03-07
Attending: EMERGENCY MEDICINE
Payer: COMMERCIAL

## 2022-03-07 VITALS
HEART RATE: 92 BPM | RESPIRATION RATE: 18 BRPM | BODY MASS INDEX: 22.62 KG/M2 | TEMPERATURE: 98 F | SYSTOLIC BLOOD PRESSURE: 144 MMHG | OXYGEN SATURATION: 96 % | DIASTOLIC BLOOD PRESSURE: 76 MMHG | WEIGHT: 138 LBS

## 2022-03-07 DIAGNOSIS — S60.221A CONTUSION OF RIGHT HAND, INITIAL ENCOUNTER: ICD-10-CM

## 2022-03-07 PROCEDURE — 99283 EMERGENCY DEPT VISIT LOW MDM: CPT

## 2022-03-07 PROCEDURE — 73130 X-RAY EXAM OF HAND: CPT | Mod: RT

## 2022-03-07 NOTE — ED TRIAGE NOTES
Pt presents to the ED with c/o falling on ice. Pt c/o right hand pain. Pt hit right side of face. Denies any thinners, LOC, vision changes, or any balance issues. Denies any cervical tenderness.

## 2022-03-08 ENCOUNTER — LAB REQUISITION (OUTPATIENT)
Dept: LAB | Facility: CLINIC | Age: 77
End: 2022-03-08
Payer: COMMERCIAL

## 2022-03-08 DIAGNOSIS — E78.00 PURE HYPERCHOLESTEROLEMIA, UNSPECIFIED: ICD-10-CM

## 2022-03-08 DIAGNOSIS — M81.0 AGE-RELATED OSTEOPOROSIS WITHOUT CURRENT PATHOLOGICAL FRACTURE: ICD-10-CM

## 2022-03-08 DIAGNOSIS — Z86.39 PERSONAL HISTORY OF OTHER ENDOCRINE, NUTRITIONAL AND METABOLIC DISEASE: ICD-10-CM

## 2022-03-08 LAB
ALBUMIN SERPL-MCNC: 3.9 G/DL (ref 3.5–5)
ALP SERPL-CCNC: 65 U/L (ref 45–120)
ALT SERPL W P-5'-P-CCNC: 12 U/L (ref 0–45)
ANION GAP SERPL CALCULATED.3IONS-SCNC: 13 MMOL/L (ref 5–18)
AST SERPL W P-5'-P-CCNC: 21 U/L (ref 0–40)
BILIRUB SERPL-MCNC: 0.3 MG/DL (ref 0–1)
BUN SERPL-MCNC: 12 MG/DL (ref 8–28)
CALCIUM SERPL-MCNC: 9.7 MG/DL (ref 8.5–10.5)
CHLORIDE BLD-SCNC: 104 MMOL/L (ref 98–107)
CHOLEST SERPL-MCNC: 163 MG/DL
CO2 SERPL-SCNC: 23 MMOL/L (ref 22–31)
CREAT SERPL-MCNC: 0.85 MG/DL (ref 0.6–1.1)
FASTING STATUS PATIENT QL REPORTED: NORMAL
GFR SERPL CREATININE-BSD FRML MDRD: 71 ML/MIN/1.73M2
GLUCOSE BLD-MCNC: 115 MG/DL (ref 70–125)
HDLC SERPL-MCNC: 50 MG/DL
LDLC SERPL CALC-MCNC: 92 MG/DL
POTASSIUM BLD-SCNC: 3.9 MMOL/L (ref 3.5–5)
PROT SERPL-MCNC: 6.9 G/DL (ref 6–8)
SODIUM SERPL-SCNC: 140 MMOL/L (ref 136–145)
TRIGL SERPL-MCNC: 106 MG/DL

## 2022-03-08 PROCEDURE — 82306 VITAMIN D 25 HYDROXY: CPT | Mod: ORL | Performed by: STUDENT IN AN ORGANIZED HEALTH CARE EDUCATION/TRAINING PROGRAM

## 2022-03-08 PROCEDURE — 80061 LIPID PANEL: CPT | Mod: ORL | Performed by: STUDENT IN AN ORGANIZED HEALTH CARE EDUCATION/TRAINING PROGRAM

## 2022-03-08 PROCEDURE — 80053 COMPREHEN METABOLIC PANEL: CPT | Mod: ORL | Performed by: STUDENT IN AN ORGANIZED HEALTH CARE EDUCATION/TRAINING PROGRAM

## 2022-03-08 NOTE — ED PROVIDER NOTES
EMERGENCY DEPARTMENT ENCOUNTER     NAME: Pam Chino   AGE: 76 year old female   YOB: 1945   MRN: [id number]   EVALUATION DATE & TIME: No admission date for patient encounter.   PCP: Sahil Heller     Chief Complaint   Patient presents with     Fall     Hand Injury   :    FINAL IMPRESSION       1. Contusion of right hand, initial encounter           ED COURSE & MEDICAL DECISION MAKING      Pertinent Labs & Imaging studies reviewed. (See chart for details)   76 year old female  presents to the Emergency Department for evaluation of right due to a fall. Initial Vitals Reviewed. Initial exam notable for generally well-appearing patient who is icing her right hand which is somewhat swollen.  There is already some visible ecchymosis but it is neurovascularly intact without deformity.  X-ray was obtained to rule out fracture or dislocation and is negative.  Patient to be placed in an Ace wrap, we discussed elevation, rice instructions, and management of contusion and she will be discharged in stable condition.        6:57 PM I met with the patient, obtained history, performed an initial exam, and discussed options and plan for diagnostics and treatment here in the ED. PPE worn including N95 mask, surgical gloves.  6:59 PM Informed the patient on imaging results and discussed plans for discharge.     At the conclusion of the encounter I discussed the results of all of the tests and the disposition. The questions were answered. The patient or family acknowledged understanding and was agreeable with the care plan.         MEDICATIONS GIVEN IN THE EMERGENCY:   Medications - No data to display   NEW PRESCRIPTIONS STARTED AT TODAY'S ER VISIT   New Prescriptions    No medications on file     ================================================================   HISTORY OF PRESENT ILLNESS       Patient information was obtained from: Patient   Use of Intrepreter: N/A    Pam Chino is a 76 year old female with  history of osteoporosis, s/p right total knee replacement who presents by walk in for the evaluation of fall, hand pain. Patient reports she accidentally slipped on ice and fell on her right hand earlier today. Since then she reports right hand pain with associated swelling. Patient had ice on her hand in triage. No other complaints at this time.    ================================================================    REVIEW OF SYSTEMS       Review of Systems   Musculoskeletal:        Positive for hand pain (right), hand swelling (right).   All other systems reviewed and are negative.      PAST HISTORY     PAST MEDICAL HISTORY:   Past Medical History:   Diagnosis Date     Anxiety      Basal cell carcinoma      Chest pain 12/22/2011     Depression      Gastritis      GERD (gastroesophageal reflux disease)      Hypertension      Nodular basal cell carcinoma 07/17/2014    Left distal calf      Osteoporosis      Patella fracture 02/04/2006     Scoliosis       PAST SURGICAL HISTORY:   Past Surgical History:   Procedure Laterality Date     COLONOSCOPY       MOHS MICROGRAPHIC PROCEDURE Left 2014    BCE distal calf     OTHER SURGICAL HISTORY  03/13/1997    laser cone biopsy     OTHER SURGICAL HISTORY  Oct. 2015    nose bxCA     SKIN CANCER EXCISION      maru. leg- in office     UPPER GASTROINTESTINAL ENDOSCOPY       ZZC TOTAL KNEE ARTHROPLASTY Right 11/11/2015    Procedure: RIGHT KNEE TOTAL ARTHROPLASTY;  Surgeon: Benny Lopez MD;  Location: Health system;  Service: Orthopedics      CURRENT MEDICATIONS:   acetaminophen (TYLENOL) 500 MG tablet  amoxicillin (AMOXIL) 500 MG capsule  CALCIUM CITRATE/VITAMIN D3 (CALCIUM CITRATE + D ORAL)  chlorpheniramine-phenylephrine 4-10 mg per tablet  cholecalciferol, vitamin D3, (VITAMIN D3) 2,000 unit Tab  citalopram (CELEXA) 20 MG tablet  LORazepam (ATIVAN) 1 MG tablet  multivitamin (MULTIVITAMIN) per tablet  omeprazole (PRILOSEC) 20 MG capsule      ALLERGIES:   Allergies   Allergen  "Reactions     Other Environmental Allergy Other (See Comments)     Bandaids cause redness      FAMILY HISTORY:   Family History   Problem Relation Age of Onset     Chronic Obstructive Pulmonary Disease Father       SOCIAL HISTORY:   Social History     Socioeconomic History     Marital status:      Spouse name: Not on file     Number of children: Not on file     Years of education: Not on file     Highest education level: Not on file   Occupational History     Not on file   Tobacco Use     Smoking status: Former Smoker     Years: 5.00     Quit date: 11/10/1980     Years since quittin.3     Smokeless tobacco: Never Used     Tobacco comment: \"socially\"   Substance and Sexual Activity     Alcohol use: Yes     Comment: Alcoholic Drinks/day: social     Drug use: No     Sexual activity: Not on file   Other Topics Concern     Not on file   Social History Narrative     Not on file     Social Determinants of Health     Financial Resource Strain: Not on file   Food Insecurity: Not on file   Transportation Needs: Not on file   Physical Activity: Not on file   Stress: Not on file   Social Connections: Not on file   Intimate Partner Violence: Not on file   Housing Stability: Not on file        VITALS  Patient Vitals for the past 24 hrs:   BP Temp Temp src Pulse Resp SpO2 Weight   22 1737 (!) 144/76 98  F (36.7  C) Temporal 92 18 96 % 62.6 kg (138 lb)        ================================================================    PHYSICAL EXAM     VITAL SIGNS: BP (!) 144/76 (BP Location: Left arm)   Pulse 92   Temp 98  F (36.7  C) (Temporal)   Resp 18   Wt 62.6 kg (138 lb)   SpO2 96%   BMI 22.62 kg/m     Constitutional:  Awake, no acute distress   HENT:  Atraumatic, oropharynx without exudate or erythema, membranes moist  Lymph:  No adenopathy  Eyes: EOM intact, PERRL, no injection  Neck: Supple  Respiratory:  Clear to auscultation bilaterally, no wheezes or crackles   Cardiovascular:  Regular rate and rhythm, " single S1 and S2   GI:  Soft, nontender, nondistended, no rebound or guarding   Musculoskeletal:  Swelling and ecchymosis at the mid right hand. Radial pulses, capillary refill, fingers intact. Moves all extremities, no lower extremity edema, no deformities    Skin:  Warm, dry  Neurologic:  Alert and oriented x3, no focal deficits noted       ================================================================  LAB       All pertinent labs reviewed and interpreted.   Labs Ordered and Resulted from Time of ED Arrival to Time of ED Departure - No data to display     ===============================================================  RADIOLOGY       Reviewed all pertinent imaging. Please see official radiology report.   XR Hand Right G/E 3 Views   Final Result   IMPRESSION: No acute fracture or malalignment. Severe first CMC and STT joint degenerative changes with bone-on-bone articulation. Otherwise mild to moderate multifocal IP joint degenerative changes. Osteopenia. Dorsal hand soft tissue swelling.            ================================================================  EKG         I have independently reviewed and interpreted the EKG(s) documented above.     ================================================================  PROCEDURES         I, Jimbo Tucker, am serving as a scribe to document services personally performed by Dr. Mosley based on my observation and the provider's statements to me. I, Evelyn Mosley MD attest that Jimbo Tucker is acting in a scribe capacity, has observed my performance of the services and has documented them in accordance with my direction.     Evelyn Mosley M.D.   Emergency Medicine   [facility] EMERGENCY ROOM  [address] [facility] [address]  [phone]  Dept: [phone]      Evelyn Mosley MD  03/07/22 4803

## 2022-03-09 LAB — DEPRECATED CALCIDIOL+CALCIFEROL SERPL-MC: 73 UG/L (ref 30–80)

## 2022-04-15 DIAGNOSIS — M81.0 SENILE OSTEOPOROSIS: Primary | ICD-10-CM

## 2022-04-15 DIAGNOSIS — Z92.29 PERSONAL HISTORY OF OTHER DRUG THERAPY: ICD-10-CM

## 2022-05-18 ENCOUNTER — ALLIED HEALTH/NURSE VISIT (OUTPATIENT)
Dept: FAMILY MEDICINE | Facility: CLINIC | Age: 77
End: 2022-05-18
Payer: COMMERCIAL

## 2022-05-18 DIAGNOSIS — M81.0 SENILE OSTEOPOROSIS: Primary | ICD-10-CM

## 2022-05-18 PROCEDURE — 99207 PR NO CHARGE NURSE ONLY: CPT

## 2022-05-18 PROCEDURE — 96372 THER/PROPH/DIAG INJ SC/IM: CPT | Performed by: INTERNAL MEDICINE

## 2022-05-18 NOTE — PROGRESS NOTES
"The following steps were completed to comply with the REMS program for Prolia:  1. Ordering provider has previously reviewed information in the Medication Guide and Patient Counseling Chart, including the serious risks of Prolia  and the symptoms of each risk and have been advised to seek prompt medical attention if they have signs or symptoms of any of the serious risks.  2. Provided each patient a copy of the Medication Guide and Patient Brochure.  See MAR for administration details.   Indication: Prolia  (denosumab) is a prescription medicine used to treat osteoporosis in patients who:   Are at high risk for fracture, meaning patients who have had a fracture related to osteoporosis, or who have multiple risk factors for fracture; Cannot use another osteoporosis medicine or other osteoporosis medicines did not work well.   The timeline for early/late injections would be 4 weeks early and any time after the 6 month christian. If a patient receives their injection late, then the subsequent injection would be 6 months from the date that they actually received the injection    Have the screening questions been asked prior to this administration? No    Prolia Injection Phone Screen      Screening questions have been asked 2-3 days prior to administration visit for Prolia. If any questions are answered with \"Yes,\" this phone encounter were will routed to ordering provider for further evaluation.     1.  When was the last injection?  11/18/2021    2.  Has insurance for this injection been verified?  Yes    3.  Did you experience any new onset achiness or rashes that lasted for over a month with your previous Prolia injection?   No    4.  Do you have a fever over 101?F or a new deep cough that is unusual for you today? No    5.  Have you started any new medications in the last 6 months that you were told could affect your immune system? These may have been prescribed by oncologist, transplant, rheumatology, or dermatology. "   No    6.  In the last 6 months have you have gastric bypass or parathyroid surgery?   No    7.  Do you plan dental work requiring drilling into the bone such as implants/extractions or oral surgery in the next 2-3 months?   No    8. Do you have new insurance since the last injection? No     9. Have you received the Covid-19 vaccine? Yes  If No - Proceed with Prolia injection  If Yes - Date of vaccination 1/28/21, 2/25/21, 11/29/21. Will need to wait until 2 weeks after 2nd dose of Covid-19 vaccine before administering Prolia       Patient informed if symptoms discussed above present prior to their administration appointment, they are to notify clinic immediately.     Shayna Walters

## 2022-11-22 ENCOUNTER — OFFICE VISIT (OUTPATIENT)
Dept: INTERNAL MEDICINE | Facility: CLINIC | Age: 77
End: 2022-11-22
Payer: COMMERCIAL

## 2022-11-22 VITALS
BODY MASS INDEX: 22.61 KG/M2 | OXYGEN SATURATION: 97 % | TEMPERATURE: 98.8 F | DIASTOLIC BLOOD PRESSURE: 80 MMHG | HEIGHT: 66 IN | WEIGHT: 140.7 LBS | HEART RATE: 106 BPM | SYSTOLIC BLOOD PRESSURE: 124 MMHG

## 2022-11-22 DIAGNOSIS — M81.0 SENILE OSTEOPOROSIS: Primary | ICD-10-CM

## 2022-11-22 DIAGNOSIS — F41.9 ANXIETY: ICD-10-CM

## 2022-11-22 DIAGNOSIS — S62.91XD CLOSED FRACTURE OF RIGHT HAND WITH ROUTINE HEALING, SUBSEQUENT ENCOUNTER: ICD-10-CM

## 2022-11-22 DIAGNOSIS — K21.00 GASTROESOPHAGEAL REFLUX DISEASE WITH ESOPHAGITIS WITHOUT HEMORRHAGE: ICD-10-CM

## 2022-11-22 DIAGNOSIS — Z87.81 HISTORY OF PATELLAR FRACTURE: ICD-10-CM

## 2022-11-22 PROCEDURE — 99214 OFFICE O/P EST MOD 30 MIN: CPT | Mod: 25 | Performed by: INTERNAL MEDICINE

## 2022-11-22 PROCEDURE — 96372 THER/PROPH/DIAG INJ SC/IM: CPT | Performed by: INTERNAL MEDICINE

## 2022-11-22 NOTE — PROGRESS NOTES
(M81.0) Osteoporosis Senile  (primary encounter diagnosis)  Comment: Prior Prolia, was on Fosamax. Tolerating Prolia well for 9 years. DXA from 5/2021 reviewed.  She fell in march from the standing height and fractured 4th MCP on the right hand. She has h/o patellar fracture.  Plan: DX Hip/Pelvis/Spine            (F41.9) Anxiety  Comment: On citalopram, stable      (K21.00) Gastroesophageal reflux disease with esophagitis without hemorrhage  Comment: On omeprazole stable.      (S62.91XD) Closed fracture of right hand with routine healing, subsequent encounter    Taking SSRI s is associated with lower bone mineral density in children and adults. In a study of adults over the age of 50, use of SSRIs not only lowered bone mineral density but they also increased the odds of falling and doubled the risk of osteoporosis fractures.    Acid blocking medications, such as Aciphex, Nexium, Prevacid, Protonix, Prilosec and Nexium are commonly used for heartburn. These medications can increase osteoporosis and fracture risk. A large study of more than 13,000 patients concluded that hip fracture risk increases by 22% after one year of taking the medication and by 54% after 3 years.       Patient was educated on safety of Prolia utilizing Patient Counseling Chart for Healthcare Providers, as outlined by the Prolia REMS progam.     Return in about 6 months (around 5/22/2023) for Prolia with CSS.    Patient Instructions   Prolia 18th today.  Prolia 19th in 6 months with my nurse. I will see you in 1 year.    DXA in 5/2023 .   Phone number to schedule [phone].    Daily calcium need is 7509-0483 mg a day from the diet and supplements.  Calcium citrate is easier to digest.  Vitamin D 2000 IU daily recommended.    Risk of rebound vertebral fractures is higher when Prolia suddenly stopped or dose was missed.      Prolia and Covid vaccine should be  for at least a week.           /80   Pulse 106   Temp 98.8  F  "(37.1  C) (Oral)   Ht 1.664 m (5' 5.5\")   Wt 63.8 kg (140 lb 11.2 oz)   SpO2 97%   BMI 23.06 kg/m        Did you experience any problems with previous Prolia injection? no  Any medication change in the last 6 months? no  Did you take prednisone or other immunosupressant drugs in the last 6 months   (chemo, transplant, rheum, dermatology conditions)? no  Did you have any serious infection in the last 6 months?no  Any recent hospitalizations?no  Do you plan any dental work in the next 2-3 months?no  How much calcium do you take daily from the diet and supplements?1200 mg  How much vit D do you take daily? 2000 IU  Last DXA? 5/2021, Reviewed and discussed      Patient is here today for the  Prolia injection. Patient tolerated previous injections well.   We discussed calcium and vit D daily needs today.   I reviewed the lab results:  Component      Latest Ref Rng & Units 3/8/2022   Sodium      136 - 145 mmol/L 140   Potassium      3.5 - 5.0 mmol/L 3.9   Chloride      98 - 107 mmol/L 104   Carbon Dioxide      22 - 31 mmol/L 23   Anion Gap      5 - 18 mmol/L 13   Urea Nitrogen      8 - 28 mg/dL 12   Creatinine      0.60 - 1.10 mg/dL 0.85   Calcium      8.5 - 10.5 mg/dL 9.7   Glucose      70 - 125 mg/dL 115   Alkaline Phosphatase      45 - 120 U/L 65   AST      0 - 40 U/L 21   ALT      0 - 45 U/L 12   Protein Total      6.0 - 8.0 g/dL 6.9   Albumin      3.5 - 5.0 g/dL 3.9   Bilirubin Total      0.0 - 1.0 mg/dL 0.3   GFR Estimate      >60 mL/min/1.73m2 71   Vitamin D Deficiency screening      30 - 80 ug/L 73       We discussed high risk of rebound vertebral fractures when Prolia suddenly stopped.    Next Prolia injection will be in 6 months.           This note has been dictated using voice recognition software. Any grammatical or context distortions are unintentional and inherent to the software      Patient Active Problem List   Diagnosis     Benign Essential Hypertension     Osteoporosis Senile     Osteoarthritis of " right knee     Depression     Anxiety     GERD (gastroesophageal reflux disease)     History of patellar fracture       Current Outpatient Medications   Medication     acetaminophen (TYLENOL) 500 MG tablet     amoxicillin (AMOXIL) 500 MG capsule     CALCIUM CITRATE/VITAMIN D3 (CALCIUM CITRATE + D ORAL)     chlorpheniramine-phenylephrine 4-10 mg per tablet     cholecalciferol, vitamin D3, (VITAMIN D3) 2,000 unit Tab     citalopram (CELEXA) 20 MG tablet     multivitamin (MULTIVITAMIN) per tablet     omeprazole (PRILOSEC) 20 MG capsule     Current Facility-Administered Medications   Medication     denosumab (PROLIA) injection 60 mg

## 2022-11-22 NOTE — PATIENT INSTRUCTIONS
Prolia 18th today.  Prolia 19th in 6 months with my nurse. I will see you in 1 year.    DXA in 5/2023 .   Phone number to schedule [phone].    Daily calcium need is 4309-6214 mg a day from the diet and supplements.  Calcium citrate is easier to digest.  Vitamin D 2000 IU daily recommended.    Risk of rebound vertebral fractures is higher when Prolia suddenly stopped or dose was missed.      Prolia and Covid vaccine should be  for at least a week.

## 2023-02-03 ENCOUNTER — LAB REQUISITION (OUTPATIENT)
Dept: LAB | Facility: CLINIC | Age: 78
End: 2023-02-03
Payer: COMMERCIAL

## 2023-02-03 DIAGNOSIS — Z47.1 AFTERCARE FOLLOWING JOINT REPLACEMENT SURGERY: ICD-10-CM

## 2023-02-03 LAB
CRP SERPL-MCNC: 5.71 MG/L
ERYTHROCYTE [SEDIMENTATION RATE] IN BLOOD BY WESTERGREN METHOD: 15 MM/HR (ref 0–30)

## 2023-02-03 PROCEDURE — 86140 C-REACTIVE PROTEIN: CPT | Mod: ORL | Performed by: STUDENT IN AN ORGANIZED HEALTH CARE EDUCATION/TRAINING PROGRAM

## 2023-02-03 PROCEDURE — 85652 RBC SED RATE AUTOMATED: CPT | Mod: ORL | Performed by: STUDENT IN AN ORGANIZED HEALTH CARE EDUCATION/TRAINING PROGRAM

## 2023-04-19 ENCOUNTER — LAB REQUISITION (OUTPATIENT)
Dept: LAB | Facility: CLINIC | Age: 78
End: 2023-04-19
Payer: COMMERCIAL

## 2023-04-19 DIAGNOSIS — Z86.39 PERSONAL HISTORY OF OTHER ENDOCRINE, NUTRITIONAL AND METABOLIC DISEASE: ICD-10-CM

## 2023-04-19 DIAGNOSIS — M81.0 AGE-RELATED OSTEOPOROSIS WITHOUT CURRENT PATHOLOGICAL FRACTURE: ICD-10-CM

## 2023-04-19 DIAGNOSIS — E78.00 PURE HYPERCHOLESTEROLEMIA, UNSPECIFIED: ICD-10-CM

## 2023-04-19 LAB
ALBUMIN SERPL BCG-MCNC: 4.2 G/DL (ref 3.5–5.2)
ALP SERPL-CCNC: 65 U/L (ref 35–104)
ALT SERPL W P-5'-P-CCNC: 17 U/L (ref 10–35)
ANION GAP SERPL CALCULATED.3IONS-SCNC: 14 MMOL/L (ref 7–15)
AST SERPL W P-5'-P-CCNC: 24 U/L (ref 10–35)
BILIRUB SERPL-MCNC: 0.3 MG/DL
BUN SERPL-MCNC: 16.9 MG/DL (ref 8–23)
CALCIUM SERPL-MCNC: 9.8 MG/DL (ref 8.8–10.2)
CHLORIDE SERPL-SCNC: 102 MMOL/L (ref 98–107)
CHOLEST SERPL-MCNC: 161 MG/DL
CREAT SERPL-MCNC: 0.91 MG/DL (ref 0.51–0.95)
DEPRECATED HCO3 PLAS-SCNC: 24 MMOL/L (ref 22–29)
GFR SERPL CREATININE-BSD FRML MDRD: 65 ML/MIN/1.73M2
GLUCOSE SERPL-MCNC: 97 MG/DL (ref 70–99)
HDLC SERPL-MCNC: 49 MG/DL
LDLC SERPL CALC-MCNC: 87 MG/DL
NONHDLC SERPL-MCNC: 112 MG/DL
POTASSIUM SERPL-SCNC: 4.6 MMOL/L (ref 3.4–5.3)
PROT SERPL-MCNC: 6.7 G/DL (ref 6.4–8.3)
SODIUM SERPL-SCNC: 140 MMOL/L (ref 136–145)
TRIGL SERPL-MCNC: 123 MG/DL

## 2023-04-19 PROCEDURE — 80053 COMPREHEN METABOLIC PANEL: CPT | Mod: ORL | Performed by: STUDENT IN AN ORGANIZED HEALTH CARE EDUCATION/TRAINING PROGRAM

## 2023-04-19 PROCEDURE — 80061 LIPID PANEL: CPT | Mod: ORL | Performed by: STUDENT IN AN ORGANIZED HEALTH CARE EDUCATION/TRAINING PROGRAM

## 2023-04-19 PROCEDURE — 82306 VITAMIN D 25 HYDROXY: CPT | Mod: ORL | Performed by: STUDENT IN AN ORGANIZED HEALTH CARE EDUCATION/TRAINING PROGRAM

## 2023-04-20 LAB — DEPRECATED CALCIDIOL+CALCIFEROL SERPL-MC: 83 UG/L (ref 20–75)

## 2023-05-09 DIAGNOSIS — Z92.29 PERSONAL HISTORY OF OTHER DRUG THERAPY: ICD-10-CM

## 2023-05-09 DIAGNOSIS — M81.0 SENILE OSTEOPOROSIS: Primary | ICD-10-CM

## 2023-05-22 ENCOUNTER — ANCILLARY PROCEDURE (OUTPATIENT)
Dept: BONE DENSITY | Facility: CLINIC | Age: 78
End: 2023-05-22
Payer: COMMERCIAL

## 2023-05-22 DIAGNOSIS — M81.0 SENILE OSTEOPOROSIS: ICD-10-CM

## 2023-05-22 DIAGNOSIS — Z78.0 POSTMENOPAUSAL STATUS: ICD-10-CM

## 2023-05-22 PROCEDURE — 77081 DXA BONE DENSITY APPENDICULR: CPT | Mod: TC | Performed by: RADIOLOGY

## 2023-05-22 PROCEDURE — 77080 DXA BONE DENSITY AXIAL: CPT | Mod: TC | Performed by: RADIOLOGY

## 2023-05-25 ENCOUNTER — ALLIED HEALTH/NURSE VISIT (OUTPATIENT)
Dept: FAMILY MEDICINE | Facility: CLINIC | Age: 78
End: 2023-05-25
Payer: COMMERCIAL

## 2023-05-25 DIAGNOSIS — M81.0 SENILE OSTEOPOROSIS: Primary | ICD-10-CM

## 2023-05-25 PROCEDURE — 96372 THER/PROPH/DIAG INJ SC/IM: CPT | Performed by: INTERNAL MEDICINE

## 2023-05-25 PROCEDURE — 99207 PR NO CHARGE NURSE ONLY: CPT

## 2023-05-25 NOTE — PROGRESS NOTES
Indication: Prolia  (denosumab) is a prescription medicine used to treat osteoporosis in patients who:     Are at high risk for fracture, meaning patients who have had a fracture related to osteoporosis, or who have multiple risk factors for fracture     Cannot use another osteoporosis medicine or other osteoporosis medicines did not work well   The timeline for early/late injections would be 4 weeks early and any time after the 6 month christian. If a patient receives their injection late, then the subsequent injection would be 6 months from the date that they actually received the injection    1.  When was the last injection?  11/22/22  2.  Did they check with their insurance for this calendar year?  Yes  3.  Is there an order in the chart?  Yes  4.  Has the patient had dental work involving the bone in the past month or will have work in the next 6 months?  No  5.  Did you have the patient wait 15 minutes after the injection?  Yes      The following steps were completed to comply with the REMS program for Prolia:    Reviewed information in the Medication Guide and Patient Counseling Chart, including the serious risks of Prolia  and the symptoms of each risk.    Advised patient to seek prompt medical attention if they have signs or symptoms of any of the serious risks.  Provided each patient a copy of the Medication Guide and Patient Brochure.    Clinic Administered Medication Documentation      Prolia Documentation    Indication: Prolia  (denosumab) is a prescription medicine used to treat osteoporosis in patients who:     Are at high risk for fracture, meaning patients who have had a fracture related to osteoporosis, or who have multiple risk factors for fracture.    Cannot use another osteoporosis medicine or other osteoporosis medicines did not work well.    The timeline for early/late injections would be 4 weeks early and any time after the 6 month christian. If a patient receives their injection late, then the  subsequent injection would be 6 months from the date that they actually received the injection.    When was the last injection?  22  Was the last injection at least 6 months ago? Yes  Has the prior authorization been completed?  Yes  Is there an active order (written within the past 365 days, with administrations remaining, not ) in the chart?  Yes  Patient denies any dental work involving the bone (e.g. tooth extraction or dental implants) in the past 4 weeks?  Yes  Patient denies plans for any dental work involving the bone (e.g. tooth extraction or dental implants) in the next 4 weeks? Yes    The following steps were completed to comply with the REMS program for Prolia:    Reviewed information in the Medication Guide and Patient Counseling Chart, including the serious risks of Prolia  and the symptoms of each risk.    Advised patient to seek prompt medical attention if they have signs or symptoms of any of the serious risks.    Provided each patient a copy of the Medication Guide and Patient Brochure.      Prior to injection, verified patient identity using patient's name and date of birth. Medication was administered. Please see MAR and medication order for additional information. Patient instructed to remain in clinic for 15 minutes and report any adverse reaction to staff immediately.    Vial/Syringe: Syringe  Was this medication supplied by the patient? No  Verified that the patient has refills remaining in their prescription.

## 2023-11-09 ENCOUNTER — HOSPITAL ENCOUNTER (OUTPATIENT)
Dept: MAMMOGRAPHY | Facility: CLINIC | Age: 78
Discharge: HOME | End: 2023-11-09
Attending: STUDENT IN AN ORGANIZED HEALTH CARE EDUCATION/TRAINING PROGRAM | Admitting: STUDENT IN AN ORGANIZED HEALTH CARE EDUCATION/TRAINING PROGRAM
Payer: COMMERCIAL

## 2023-11-09 DIAGNOSIS — Z12.31 VISIT FOR SCREENING MAMMOGRAM: ICD-10-CM

## 2023-11-09 PROCEDURE — 77067 SCR MAMMO BI INCL CAD: CPT

## 2023-11-11 ENCOUNTER — HOSPITAL ENCOUNTER (EMERGENCY)
Facility: CLINIC | Age: 78
Discharge: HOME | End: 2023-11-11
Attending: EMERGENCY MEDICINE | Admitting: EMERGENCY MEDICINE
Payer: COMMERCIAL

## 2023-11-11 ENCOUNTER — APPOINTMENT (OUTPATIENT)
Dept: RADIOLOGY | Facility: CLINIC | Age: 78
End: 2023-11-11
Attending: EMERGENCY MEDICINE
Payer: COMMERCIAL

## 2023-11-11 ENCOUNTER — APPOINTMENT (OUTPATIENT)
Dept: CT IMAGING | Facility: CLINIC | Age: 78
End: 2023-11-11
Attending: EMERGENCY MEDICINE
Payer: COMMERCIAL

## 2023-11-11 VITALS
HEART RATE: 100 BPM | WEIGHT: 134 LBS | OXYGEN SATURATION: 98 % | TEMPERATURE: 97.8 F | RESPIRATION RATE: 18 BRPM | BODY MASS INDEX: 21.53 KG/M2 | SYSTOLIC BLOOD PRESSURE: 138 MMHG | HEIGHT: 66 IN | DIASTOLIC BLOOD PRESSURE: 81 MMHG

## 2023-11-11 DIAGNOSIS — S02.2XXA CLOSED FRACTURE OF NASAL BONE, INITIAL ENCOUNTER: ICD-10-CM

## 2023-11-11 DIAGNOSIS — R07.89 CHEST WALL PAIN: ICD-10-CM

## 2023-11-11 LAB
ATRIAL RATE - MUSE: 121 BPM
DIASTOLIC BLOOD PRESSURE - MUSE: NORMAL MMHG
INTERPRETATION ECG - MUSE: NORMAL
P AXIS - MUSE: 68 DEGREES
PR INTERVAL - MUSE: 126 MS
QRS DURATION - MUSE: 78 MS
QT - MUSE: 326 MS
QTC - MUSE: 462 MS
R AXIS - MUSE: 6 DEGREES
SYSTOLIC BLOOD PRESSURE - MUSE: NORMAL MMHG
T AXIS - MUSE: 56 DEGREES
VENTRICULAR RATE- MUSE: 121 BPM

## 2023-11-11 PROCEDURE — 71101 X-RAY EXAM UNILAT RIBS/CHEST: CPT | Mod: RT

## 2023-11-11 PROCEDURE — 99285 EMERGENCY DEPT VISIT HI MDM: CPT | Mod: 25

## 2023-11-11 PROCEDURE — 70486 CT MAXILLOFACIAL W/O DYE: CPT

## 2023-11-11 PROCEDURE — 93005 ELECTROCARDIOGRAM TRACING: CPT | Performed by: EMERGENCY MEDICINE

## 2023-11-11 ASSESSMENT — ENCOUNTER SYMPTOMS
ABDOMINAL PAIN: 0
VOMITING: 0
ARTHRALGIAS: 0
SHORTNESS OF BREATH: 0

## 2023-11-11 NOTE — ED PROVIDER NOTES
Emergency Department Encounter      NAME: Pam Chino  AGE: 78 year old female  YOB: 1945  MRN: [id number]  EVALUATION DATE & TIME: No admission date for patient encounter.    PCP: Emmy Ewing    ED PROVIDER: Gustavo Lopze M.D.      Chief Complaint   Patient presents with    Fall         FINAL IMPRESSION:  1. Chest wall pain    2. Contusion of nose, initial encounter        MEDICAL DECISION MAKIN:44 PM I met with the patient, obtained history, performed an initial exam, and discussed options and plan for diagnostics and treatment here in the ED.     This patient is a 78-year-old female who presents after a fall.  She says that she was carrying a arm of 30 laundry down the stairs when she tripped and fell down the last 7 steps.  She fell forward and injured her nose.  She did not have any loss of consciousness, and denies a headache.  No neck pain.  She did notice some pain in the right upper anterior chest over the rib.  On exam she had some reproducible  Chest wall tenderness over her right rib but no crepitus.  The pain was not as exquisite as I would expect with a rib fracture.  Lungs were clear though.  I had the area and managed with right-sided chest x-ray with the PA chest and there was no rib fractures seen.  There was no pneumothorax seen.  I independently reviewed and interpreted the x-ray.  The patient had a right-sided epistaxis with the nasal trauma initially but this is since resolved.  She noted some right maxillary pain with biting down but I did not find any with palpation.  She did have bruising tenderness and swelling but no deformity over the nasal bridge.  I discussed observation versus imaging with a CT with her.  She wanted to take the CT which was done.  The CT is currently pending.    Pertinent Labs & Imaging studies reviewed. (See chart for details)    The importance of close follow up was discussed. We reviewed warning signs and symptoms, and I  instructed Ms. Chino to return to the emergency department immediately if she develops any new or worsening symptoms. I provided additional verbal discharge instructions. Ms. Chino expressed understanding and agreement with this plan of care, her questions were answered, and she was discharged in stable condition.     Medical Decision Making     History:  Supplemental history from: Documented in chart, if applicable  External Record(s) reviewed: Documented in chart, if applicable.     Work Up:  Chart documentation includes differential considered and any EKGs or imaging independently interpreted by provider, where specified.  In additional to work up documented, I considered the following work up: Documented in chart, if applicable.     External consultation:  Discussion of management with another provider: Documented in chart, if applicable     Complicating factors:  Care impacted by chronic illness: anxiety, osteoporosis senile, HTN  Care affected by social determinants of health: Access to Medical Care     Disposition considerations: Disposition is pending at change of shift with the imaging pending      MEDICATIONS GIVEN IN THE EMERGENCY:  Medications - No data to display    NEW PRESCRIPTIONS STARTED AT TODAY'S ER VISIT:  New Prescriptions    No medications on file          =================================================================    HPI    Patient information was obtained from: Patient    Use of : N/A         Pam Chino is a 78 year old female with a past medical history of anxiety, osteoporosis senile, and HTN, who presents to the emergency department by walk-in for a fall.    About an hour PTA, patient was going down the stairs carrying clothes in her arms when she tripped and face forward onto the ground. She fell down 7 steps of stairs. Denies LOC or hitting her head. She landed right on her nose and immediately her nose was bleeding a lot. Patient developed pain to nose, right upper  "anterior rib, and a \"twinge\" to left-sided chest wall. She came to the ED for a possible nose fracture. Her bleeding was controlled in triage. Patient has not taken anything for her pain. She denies shortness of breath, headaches, hip pain, abdominal pain, leg pain, nausea, vomiting, and any other symptoms. Patient is not on blood thinners.      REVIEW OF SYSTEMS   Review of Systems   HENT:  Positive for nosebleeds.    Respiratory:  Negative for shortness of breath.    Gastrointestinal:  Negative for abdominal pain and vomiting.   Musculoskeletal:  Negative for arthralgias.        Positive for right upper anterior rib pain   Neurological:  Negative for syncope.   All other systems reviewed and are negative.       PAST MEDICAL HISTORY:  Past Medical History:   Diagnosis Date    Anxiety     Basal cell carcinoma     Chest pain 12/22/2011    Depression     Gastritis     GERD (gastroesophageal reflux disease)     Hypertension     Nodular basal cell carcinoma 07/17/2014    Left distal calf     Osteoporosis     Patella fracture 02/04/2006    Scoliosis        PAST SURGICAL HISTORY:  Past Surgical History:   Procedure Laterality Date    COLONOSCOPY      MOHS MICROGRAPHIC PROCEDURE Left 2014    BCE distal calf    OTHER SURGICAL HISTORY  03/13/1997    laser cone biopsy    OTHER SURGICAL HISTORY  Oct. 2015    nose bxCA    SKIN CANCER EXCISION      maru. leg- in office    UPPER GASTROINTESTINAL ENDOSCOPY      ZZC TOTAL KNEE ARTHROPLASTY Right 11/11/2015    Procedure: RIGHT KNEE TOTAL ARTHROPLASTY;  Surgeon: Benny Lopez MD;  Location: [facility];  Service: Orthopedics       CURRENT MEDICATIONS:      Current Facility-Administered Medications:     denosumab (PROLIA) injection 60 mg, 60 mg, Subcutaneous, Q6 Months, Willem Cotter MD, 60 mg at 05/25/23 0908    Current Outpatient Medications:     acetaminophen (TYLENOL) 500 MG tablet, [ACETAMINOPHEN (TYLENOL) 500 MG TABLET] Take 1,000 mg by mouth every 6 (six) hours as " "needed for pain. , Disp: , Rfl:     amoxicillin (AMOXIL) 500 MG capsule, [AMOXICILLIN (AMOXIL) 500 MG CAPSULE] TAKE 4 CAPSULES BY MOUTH 1 HOUR BEFORE DENTAL APPOINTMENT., Disp: , Rfl: 8    CALCIUM CITRATE/VITAMIN D3 (CALCIUM CITRATE + D ORAL), [CALCIUM CITRATE/VITAMIN D3 (CALCIUM CITRATE + D ORAL)] Take by mouth. Calcium 630 mg and Vit D3-500 IU. Two tabs a day, Disp: , Rfl:     chlorpheniramine-phenylephrine 4-10 mg per tablet, [CHLORPHENIRAMINE-PHENYLEPHRINE 4-10 MG PER TABLET] Take 1 tablet by mouth every 6 (six) hours as needed for congestion., Disp: , Rfl:     cholecalciferol, vitamin D3, (VITAMIN D3) 2,000 unit Tab, [CHOLECALCIFEROL, VITAMIN D3, (VITAMIN D3) 2,000 UNIT TAB] Take 4,000 Units by mouth daily. , Disp: , Rfl:     citalopram (CELEXA) 20 MG tablet, [CITALOPRAM (CELEXA) 20 MG TABLET] Take 20 mg by mouth daily., Disp: , Rfl:     multivitamin (MULTIVITAMIN) per tablet, [MULTIVITAMIN (MULTIVITAMIN) PER TABLET] Take 1 tablet by mouth daily. , Disp: , Rfl:     omeprazole (PRILOSEC) 20 MG capsule, [OMEPRAZOLE (PRILOSEC) 20 MG CAPSULE] Take 20 mg by mouth daily before breakfast., Disp: , Rfl:     ALLERGIES:  Allergies   Allergen Reactions    Ibuprofen Other (See Comments)     Stomach Bleed     Other Environmental Allergy Other (See Comments)     Bandaids cause redness       FAMILY HISTORY:  Family History   Problem Relation Age of Onset    Chronic Obstructive Pulmonary Disease Father        SOCIAL HISTORY:   Social History     Socioeconomic History    Marital status:    Tobacco Use    Smoking status: Former     Years: 5     Types: Cigarettes     Quit date: 11/10/1980     Years since quittin.0    Smokeless tobacco: Never    Tobacco comments:     \"socially\"   Substance and Sexual Activity    Alcohol use: Yes     Comment: Alcoholic Drinks/day: social    Drug use: No       PHYSICAL EXAM:    Vitals: /68   Pulse (!) 128   Temp 97.8  F (36.6  C) (Temporal)   Resp 18   Ht 1.664 m (5' 5.5\")   " Wt 60.8 kg (134 lb)   SpO2 97%   BMI 21.96 kg/m     Gen:  Alert, awake, NAD  HENT:  Head atraumatic, normocephalic.  PERRL.  EOMI.  No periorbital step-offs, depression, tenderness.  No tenderness along the zygomatic arch bilaterally.  Ears atraumatic with no external bleeding or signs of trauma.  No epistaxis.  Clear oropharynx.  Dentition intact. Tenderness and bruising over the nasal bridge, dried blood in the right naris. No blood seen in the posterior pharynx.   Respiratory:  Normal respiratory rate.  Lungs CTA.  Chest wall stable to compression. Trachea midline. Tenderness over the right upper anterior rib, no crepitus.  Cardiovascular:  Regular rate and rhythm.  Palpable radial and DP pulses bilaterally.  Abdomen:  Soft, nontender, normoactive bowel sounds.    Musculoskeletal:  No midline C-spine, T-spine, L-spine tenderness.  No midline spinal step-offs noted.  FROM in all extremities.  5/5 strength in all extremities.  No gross deformities noted.  Pelvis stable.    Integument:  No ecchymosis, abrasions, hematomas, lacerations noted.    Neuro:  GCS 15, A & O x 3, sensation intact to light touch       LAB:  All pertinent labs reviewed and interpreted.  Labs Ordered and Resulted from Time of ED Arrival to Time of ED Departure - No data to display    RADIOLOGY:  Ribs XR, unilat 3 views + PA chest, right   Final Result   IMPRESSION: The lungs are hyperinflated. Mild biapical pleural thickening/scarring. Minimal elevation of the left hemidiaphragm. No effusions or pneumothorax. Heart size is normal. Scoliosis and degenerative changes of the spine. No definitive rib    fractures.      CT Facial Bones without Contrast    (Results Pending)       EKG:   Performed at: 11/11/2023 1249  Impression: Sinus tachycardia. Left atrial enlargement. Cannot rule out anterior infarct, age undetermined. Abnormal ECG.  Rate: 121 BPM  Rhythm: sinus tachycardia.  QRS Interval: 78 ms  QTc Interval: 462 ms  Comparison: When compared  with ECG on 12/22/11, questionable change in QRS axis.  I have independently reviewed and interpreted the EKG(s) documented above.         I, Prudence Alba, am serving as a scribe to document services personally performed by Dr. Gustavo Lopez based on my observation and the provider's statements to me. I, Gustavo Lopez M.D. attest that Prudence Alba is acting in a scribe capacity, has observed my performance of the services and has documented them in accordance with my direction.      Gustavo Lopez M.D.  Emergency Medicine  [facility] EMERGENCY ROOM  [address] [address] [phone]  Dept: [phone]       Gustvao Lopez MD  11/11/23 1410

## 2023-11-11 NOTE — ED TRIAGE NOTES
Patient presents to ED after having a fall @1200 today, was carrying clothes in her arms and walking down carpeted stairs and tripped and fell, now has pain to chest, L shoulder and nose, denies taking thinners, denies LOC.  Macy Balderas RN.......11/11/2023 12:46 PM     Triage Assessment (Adult)       Row Name 11/11/23 1245          Triage Assessment    Airway WDL WDL        Respiratory WDL    Respiratory WDL WDL        Skin Circulation/Temperature WDL    Skin Circulation/Temperature WDL WDL        Cardiac WDL    Cardiac WDL X;chest pain        Peripheral/Neurovascular WDL    Peripheral Neurovascular WDL WDL        Cognitive/Neuro/Behavioral WDL    Cognitive/Neuro/Behavioral WDL WDL

## 2023-12-05 ENCOUNTER — OFFICE VISIT (OUTPATIENT)
Dept: INTERNAL MEDICINE | Facility: CLINIC | Age: 78
End: 2023-12-05
Payer: COMMERCIAL

## 2023-12-05 VITALS
DIASTOLIC BLOOD PRESSURE: 80 MMHG | OXYGEN SATURATION: 97 % | TEMPERATURE: 97.6 F | HEART RATE: 88 BPM | RESPIRATION RATE: 16 BRPM | SYSTOLIC BLOOD PRESSURE: 142 MMHG | HEIGHT: 66 IN | WEIGHT: 140 LBS | BODY MASS INDEX: 22.5 KG/M2

## 2023-12-05 DIAGNOSIS — M81.0 SENILE OSTEOPOROSIS: Primary | ICD-10-CM

## 2023-12-05 DIAGNOSIS — F41.9 ANXIETY: ICD-10-CM

## 2023-12-05 DIAGNOSIS — K21.00 GASTROESOPHAGEAL REFLUX DISEASE WITH ESOPHAGITIS WITHOUT HEMORRHAGE: ICD-10-CM

## 2023-12-05 PROCEDURE — 96372 THER/PROPH/DIAG INJ SC/IM: CPT | Performed by: INTERNAL MEDICINE

## 2023-12-05 PROCEDURE — 99213 OFFICE O/P EST LOW 20 MIN: CPT | Performed by: INTERNAL MEDICINE

## 2023-12-05 NOTE — PROGRESS NOTES
(M81.0) Osteoporosis Senile  (primary encounter diagnosis)  Comment: Prior Prolia, was on Fosamax. Tolerating Prolia well for 10 years. DXA from 5/2023 reviewed.  She fell in march 2022 from the standing height and fractured 4th MCP on the right hand. She has h/o patellar fracture. She fell 3 weeks ago and had  acute fracture of the right nasal bones.   Plan: Considering reported general safety on long treatment with Prolia, more than 10 years, I recommended continuing Prolia treatment. Risk of rebound fractures and decline in bone density is reported with Prolia discontinuation and even with switching to bisphosphonate.     (K21.00) Gastroesophageal reflux disease with esophagitis without hemorrhage  Comment: stable on PPI      (F41.9) Anxiety  Comment: stable on SSRI    Taking SSRI s is associated with lower bone mineral density in children and adults. In a study of adults over the age of 50, use of SSRIs not only lowered bone mineral density but they also increased the odds of falling and doubled the risk of osteoporosis fractures.     Acid blocking medications, such as Aciphex, Nexium, Prevacid, Protonix, Prilosec and Nexium are commonly used for heartburn. These medications can increase osteoporosis and fracture risk. A large study of more than 13,000 patients concluded that hip fracture risk increases by 22% after one year of taking the medication and by 54% after 3 years.   Patient was educated on safety of Prolia utilizing Patient Counseling Chart for Healthcare Providers, as outlined by the Prolia REMS progam.     Return in about 6 months (around 6/5/2024) for Prolia with CSS.    Patient Instructions   Prolia 20th today.  Prolia 21st in 6 months with my nurse. I will see you in 1 year.    DXA in 5/2025 .   Phone number to schedule [phone].    Daily calcium need is 1063-9847 mg a day from the diet and supplements.  Calcium citrate is easier to digest.  Vitamin D 2000 IU daily recommended.    Risk of  "rebound vertebral fractures is higher when Prolia suddenly stopped or dose was missed.      Prolia and Covid vaccine should be  for at least a week.           BP (!) 142/80 (BP Location: Right arm, Patient Position: Sitting)   Pulse 88   Temp 97.6  F (36.4  C)   Resp 16   Ht 1.664 m (5' 5.5\")   Wt 63.5 kg (140 lb)   LMP  (LMP Unknown)   SpO2 97%   BMI 22.94 kg/m        Did you experience any problems with previous Prolia injection? no  Any medication change in the last 6 months? no  Did you take prednisone or other immunosupressant drugs in the last 6 months   (chemo, transplant, rheum, dermatology conditions)? no  Did you have any serious infection in the last 6 months?no  Any recent hospitalizations?no  Do you plan any dental work in the next 2-3 months?no  How much calcium do you take daily from the diet and supplements?1200 mg  How much vit D do you take daily? 2000 IU  Last DXA? 5/2023  Reviewed and discussed      Patient is here today for the  Prolia injection. Patient tolerated previous injections well.   We discussed calcium and vit D daily needs today.       We discussed high risk of rebound vertebral fractures when Prolia suddenly stopped.    Next Prolia injection will be in 6 months.           This note has been dictated using voice recognition software. Any grammatical or context distortions are unintentional and inherent to the software      Patient Active Problem List   Diagnosis    Benign Essential Hypertension    Osteoporosis Senile    Osteoarthritis of right knee    Depression    Anxiety    GERD (gastroesophageal reflux disease)    History of patellar fracture       Current Outpatient Medications   Medication    acetaminophen (TYLENOL) 500 MG tablet    amoxicillin (AMOXIL) 500 MG capsule    CALCIUM CITRATE/VITAMIN D3 (CALCIUM CITRATE + D ORAL)    cholecalciferol, vitamin D3, (VITAMIN D3) 2,000 unit Tab    citalopram (CELEXA) 20 MG tablet    multivitamin (MULTIVITAMIN) per tablet    " omeprazole (PRILOSEC) 20 MG capsule     No current facility-administered medications for this visit.

## 2023-12-05 NOTE — PATIENT INSTRUCTIONS
Prolia 20th today.  Prolia 21st in 6 months with my nurse. I will see you in 1 year.    DXA in 5/2025 .   Phone number to schedule [phone].    Daily calcium need is 5665-7764 mg a day from the diet and supplements.  Calcium citrate is easier to digest.  Vitamin D 2000 IU daily recommended.    Risk of rebound vertebral fractures is higher when Prolia suddenly stopped or dose was missed.      Prolia and Covid vaccine should be  for at least a week.

## 2024-05-21 DIAGNOSIS — Z92.29 PERSONAL HISTORY OF OTHER DRUG THERAPY: ICD-10-CM

## 2024-05-21 DIAGNOSIS — M81.0 SENILE OSTEOPOROSIS: Primary | ICD-10-CM

## 2024-06-05 ENCOUNTER — TELEPHONE (OUTPATIENT)
Dept: INTERNAL MEDICINE | Facility: CLINIC | Age: 79
End: 2024-06-05

## 2024-06-05 DIAGNOSIS — M81.0 SENILE OSTEOPOROSIS: Primary | ICD-10-CM

## 2024-06-05 NOTE — TELEPHONE ENCOUNTER
Patient needs BMP drawn prior to prolia injection. Order pended. Also need a calcium 4 weeks after injection- pended order.     Called patient to inquire if she can get results from her PCP if she has gotten any labs done in the last year. She does not believe so. Specifically we need a GFR within the last year and calcium. Scheduled a lab appointment for tomorrow and rescheduled Prolia injection for next Tuesday.     Carolina CHANDLER RN

## 2024-06-06 ENCOUNTER — LAB (OUTPATIENT)
Dept: LAB | Facility: CLINIC | Age: 79
End: 2024-06-06
Payer: COMMERCIAL

## 2024-06-06 DIAGNOSIS — M81.0 SENILE OSTEOPOROSIS: ICD-10-CM

## 2024-06-06 PROCEDURE — 80053 COMPREHEN METABOLIC PANEL: CPT

## 2024-06-06 PROCEDURE — 36415 COLL VENOUS BLD VENIPUNCTURE: CPT

## 2024-06-07 LAB
ALBUMIN SERPL BCG-MCNC: 4.3 G/DL (ref 3.5–5.2)
ALP SERPL-CCNC: 66 U/L (ref 40–150)
ALT SERPL W P-5'-P-CCNC: 17 U/L (ref 0–50)
ANION GAP SERPL CALCULATED.3IONS-SCNC: 12 MMOL/L (ref 7–15)
AST SERPL W P-5'-P-CCNC: 26 U/L (ref 0–45)
BILIRUB SERPL-MCNC: 0.3 MG/DL
BUN SERPL-MCNC: 19.5 MG/DL (ref 8–23)
CALCIUM SERPL-MCNC: 9.9 MG/DL (ref 8.8–10.2)
CHLORIDE SERPL-SCNC: 101 MMOL/L (ref 98–107)
CREAT SERPL-MCNC: 0.9 MG/DL (ref 0.51–0.95)
DEPRECATED HCO3 PLAS-SCNC: 26 MMOL/L (ref 22–29)
EGFRCR SERPLBLD CKD-EPI 2021: 65 ML/MIN/1.73M2
GLUCOSE SERPL-MCNC: 99 MG/DL (ref 70–99)
POTASSIUM SERPL-SCNC: 3.9 MMOL/L (ref 3.4–5.3)
PROT SERPL-MCNC: 7.1 G/DL (ref 6.4–8.3)
SODIUM SERPL-SCNC: 139 MMOL/L (ref 135–145)

## 2024-06-11 ENCOUNTER — ALLIED HEALTH/NURSE VISIT (OUTPATIENT)
Dept: FAMILY MEDICINE | Facility: CLINIC | Age: 79
End: 2024-06-11
Payer: COMMERCIAL

## 2024-06-11 DIAGNOSIS — M81.0 SENILE OSTEOPOROSIS: Primary | ICD-10-CM

## 2024-06-11 PROCEDURE — 99207 PR NO CHARGE NURSE ONLY: CPT

## 2024-06-11 PROCEDURE — 96372 THER/PROPH/DIAG INJ SC/IM: CPT | Performed by: INTERNAL MEDICINE

## 2024-06-11 NOTE — PROGRESS NOTES
Clinic Administered Medication Documentation      Prolia Documentation    Indication: Prolia  (denosumab) is a prescription medicine used to treat osteoporosis in patients who:   Are at high risk for fracture, meaning patients who have had a fracture related to osteoporosis, or who have multiple risk factors for fracture.  Cannot use another osteoporosis medicine or other osteoporosis medicines did not work well.  The timeline for early/late injections would be 4 weeks early and any time after the 6 month christian. If a patient receives their injection late, then the subsequent injection would be 6 months from the date that they actually received the injection.    When was the last injection?  2023  Was the last injection at least 6 months ago? Yes  Has the prior authorization been completed?  Yes  Is there an active order (written within the past 365 days, with administrations remaining, not ) in the chart?  Yes   GFR Estimate   Date Value Ref Range Status   2024 65 >60 mL/min/1.73m2 Final   2021 60 (L) >60 mL/min/1.73m2 Final     Has patient had a GFR within the last 12 months? Yes   Is GFR under 30, or patient has a diagnosis of CKD4 or CKD5? No   Patient denies gastric bypass or parathyroid surgery in past 6 months? Yes - patient denies.   Patient denies dental work in the past two months involving drilling into the bone, such as implants/extractions, oral surgery or a tooth extraction that has not healed yet?  Yes  Patient denies plans for an emergency tooth extraction within the next week? Yes    The following steps were completed to comply with the REMS program for Prolia:  Reviewed information in the Medication Guide, including the serious risks of Prolia  and the symptoms of each risk.  Advised patient to seek prompt medical attention if they have signs or symptoms of any of the serious risks.  Provided each patient a copy of the Medication Guide and Patient Guide.    Prior to  injection, verified patient identity using patient's name and date of birth. Medication was administered. Please see MAR and medication order for additional information. Patient instructed to remain in clinic for 15 minutes and report any adverse reaction to staff immediately.    Vial/Syringe: Syringe  Was this medication supplied by the patient? No  Verified that the patient has refills remaining in their prescription.

## 2024-06-27 ENCOUNTER — LAB REQUISITION (OUTPATIENT)
Dept: LAB | Facility: CLINIC | Age: 79
End: 2024-06-27
Payer: COMMERCIAL

## 2024-06-27 DIAGNOSIS — Z86.39 PERSONAL HISTORY OF OTHER ENDOCRINE, NUTRITIONAL AND METABOLIC DISEASE: ICD-10-CM

## 2024-06-27 DIAGNOSIS — E78.00 PURE HYPERCHOLESTEROLEMIA, UNSPECIFIED: ICD-10-CM

## 2024-06-27 DIAGNOSIS — M81.0 AGE-RELATED OSTEOPOROSIS WITHOUT CURRENT PATHOLOGICAL FRACTURE: ICD-10-CM

## 2024-06-27 PROCEDURE — 82306 VITAMIN D 25 HYDROXY: CPT | Mod: ORL | Performed by: STUDENT IN AN ORGANIZED HEALTH CARE EDUCATION/TRAINING PROGRAM

## 2024-06-27 PROCEDURE — 80053 COMPREHEN METABOLIC PANEL: CPT | Mod: ORL | Performed by: STUDENT IN AN ORGANIZED HEALTH CARE EDUCATION/TRAINING PROGRAM

## 2024-06-27 PROCEDURE — 80061 LIPID PANEL: CPT | Mod: ORL | Performed by: STUDENT IN AN ORGANIZED HEALTH CARE EDUCATION/TRAINING PROGRAM

## 2024-06-28 LAB
ALBUMIN SERPL BCG-MCNC: 4.1 G/DL (ref 3.5–5.2)
ALP SERPL-CCNC: 67 U/L (ref 40–150)
ALT SERPL W P-5'-P-CCNC: 15 U/L (ref 0–50)
ANION GAP SERPL CALCULATED.3IONS-SCNC: 12 MMOL/L (ref 7–15)
AST SERPL W P-5'-P-CCNC: 25 U/L (ref 0–45)
BILIRUB SERPL-MCNC: 0.4 MG/DL
BUN SERPL-MCNC: 15.9 MG/DL (ref 8–23)
CALCIUM SERPL-MCNC: 9.6 MG/DL (ref 8.8–10.2)
CHLORIDE SERPL-SCNC: 105 MMOL/L (ref 98–107)
CHOLEST SERPL-MCNC: 165 MG/DL
CREAT SERPL-MCNC: 0.95 MG/DL (ref 0.51–0.95)
DEPRECATED HCO3 PLAS-SCNC: 25 MMOL/L (ref 22–29)
EGFRCR SERPLBLD CKD-EPI 2021: 61 ML/MIN/1.73M2
FASTING STATUS PATIENT QL REPORTED: ABNORMAL
FASTING STATUS PATIENT QL REPORTED: NORMAL
GLUCOSE SERPL-MCNC: 101 MG/DL (ref 70–99)
HDLC SERPL-MCNC: 51 MG/DL
LDLC SERPL CALC-MCNC: 95 MG/DL
NONHDLC SERPL-MCNC: 114 MG/DL
POTASSIUM SERPL-SCNC: 4.8 MMOL/L (ref 3.4–5.3)
PROT SERPL-MCNC: 7.1 G/DL (ref 6.4–8.3)
SODIUM SERPL-SCNC: 142 MMOL/L (ref 135–145)
TRIGL SERPL-MCNC: 93 MG/DL
VIT D+METAB SERPL-MCNC: 73 NG/ML (ref 20–50)

## 2024-11-19 ENCOUNTER — LAB REQUISITION (OUTPATIENT)
Dept: LAB | Facility: CLINIC | Age: 79
End: 2024-11-19
Payer: COMMERCIAL

## 2024-11-19 DIAGNOSIS — Z01.818 ENCOUNTER FOR OTHER PREPROCEDURAL EXAMINATION: ICD-10-CM

## 2024-11-19 LAB
ANION GAP SERPL CALCULATED.3IONS-SCNC: 11 MMOL/L (ref 7–15)
BUN SERPL-MCNC: 16.2 MG/DL (ref 8–23)
CALCIUM SERPL-MCNC: 10.3 MG/DL (ref 8.8–10.4)
CHLORIDE SERPL-SCNC: 103 MMOL/L (ref 98–107)
CREAT SERPL-MCNC: 0.82 MG/DL (ref 0.51–0.95)
EGFRCR SERPLBLD CKD-EPI 2021: 72 ML/MIN/1.73M2
GLUCOSE SERPL-MCNC: 96 MG/DL (ref 70–99)
HCO3 SERPL-SCNC: 27 MMOL/L (ref 22–29)
POTASSIUM SERPL-SCNC: 4.7 MMOL/L (ref 3.4–5.3)
SODIUM SERPL-SCNC: 141 MMOL/L (ref 135–145)

## 2024-12-02 ENCOUNTER — HOSPITAL ENCOUNTER (INPATIENT)
Facility: CLINIC | Age: 79
DRG: 402 | End: 2024-12-02
Attending: ORTHOPAEDIC SURGERY | Admitting: ORTHOPAEDIC SURGERY
Payer: COMMERCIAL

## 2024-12-02 ENCOUNTER — APPOINTMENT (OUTPATIENT)
Dept: RADIOLOGY | Facility: CLINIC | Age: 79
DRG: 402 | End: 2024-12-02
Attending: ORTHOPAEDIC SURGERY
Payer: COMMERCIAL

## 2024-12-02 ENCOUNTER — ANESTHESIA EVENT (OUTPATIENT)
Dept: SURGERY | Facility: CLINIC | Age: 79
End: 2024-12-02
Payer: COMMERCIAL

## 2024-12-02 ENCOUNTER — ANESTHESIA (OUTPATIENT)
Dept: SURGERY | Facility: CLINIC | Age: 79
End: 2024-12-02
Payer: COMMERCIAL

## 2024-12-02 DIAGNOSIS — E87.6 HYPOKALEMIA: ICD-10-CM

## 2024-12-02 DIAGNOSIS — M48.061 SPINAL STENOSIS OF LUMBAR REGION, UNSPECIFIED WHETHER NEUROGENIC CLAUDICATION PRESENT: Primary | ICD-10-CM

## 2024-12-02 DIAGNOSIS — R00.0 SINUS TACHYCARDIA: ICD-10-CM

## 2024-12-02 DIAGNOSIS — Z92.29 PERSONAL HISTORY OF OTHER DRUG THERAPY: ICD-10-CM

## 2024-12-02 DIAGNOSIS — M81.0 SENILE OSTEOPOROSIS: ICD-10-CM

## 2024-12-02 DIAGNOSIS — I50.31 ACUTE DIASTOLIC CONGESTIVE HEART FAILURE (H): ICD-10-CM

## 2024-12-02 LAB
GLUCOSE BLDC GLUCOMTR-MCNC: 110 MG/DL (ref 70–99)
HGB BLD-MCNC: 10.6 G/DL (ref 11.7–15.7)
HGB BLD-MCNC: 10.8 G/DL (ref 11.7–15.7)

## 2024-12-02 PROCEDURE — 120N000001 HC R&B MED SURG/OB

## 2024-12-02 PROCEDURE — 250N000009 HC RX 250: Performed by: STUDENT IN AN ORGANIZED HEALTH CARE EDUCATION/TRAINING PROGRAM

## 2024-12-02 PROCEDURE — 250N000011 HC RX IP 250 OP 636: Performed by: PHYSICIAN ASSISTANT

## 2024-12-02 PROCEDURE — 36415 COLL VENOUS BLD VENIPUNCTURE: CPT | Performed by: STUDENT IN AN ORGANIZED HEALTH CARE EDUCATION/TRAINING PROGRAM

## 2024-12-02 PROCEDURE — 250N000025 HC SEVOFLURANE, PER MIN: Performed by: ORTHOPAEDIC SURGERY

## 2024-12-02 PROCEDURE — 258N000003 HC RX IP 258 OP 636: Performed by: ORTHOPAEDIC SURGERY

## 2024-12-02 PROCEDURE — 999N000182 XR SURGERY CARM FLUORO GREATER THAN 5 MIN: Mod: TC

## 2024-12-02 PROCEDURE — 250N000005 HC OR RX SURGIFLO HEMOSTATIC MATRIX 10ML 199102S OPNP: Performed by: ORTHOPAEDIC SURGERY

## 2024-12-02 PROCEDURE — P9045 ALBUMIN (HUMAN), 5%, 250 ML: HCPCS | Performed by: STUDENT IN AN ORGANIZED HEALTH CARE EDUCATION/TRAINING PROGRAM

## 2024-12-02 PROCEDURE — 258N000003 HC RX IP 258 OP 636: Performed by: ANESTHESIOLOGY

## 2024-12-02 PROCEDURE — 0SG0071 FUSION OF LUMBAR VERTEBRAL JOINT WITH AUTOLOGOUS TISSUE SUBSTITUTE, POSTERIOR APPROACH, POSTERIOR COLUMN, OPEN APPROACH: ICD-10-PCS | Performed by: ORTHOPAEDIC SURGERY

## 2024-12-02 PROCEDURE — 99222 1ST HOSP IP/OBS MODERATE 55: CPT | Performed by: EMERGENCY MEDICINE

## 2024-12-02 PROCEDURE — C1763 CONN TISS, NON-HUMAN: HCPCS | Performed by: ORTHOPAEDIC SURGERY

## 2024-12-02 PROCEDURE — 250N000009 HC RX 250: Performed by: ORTHOPAEDIC SURGERY

## 2024-12-02 PROCEDURE — 258N000003 HC RX IP 258 OP 636: Performed by: EMERGENCY MEDICINE

## 2024-12-02 PROCEDURE — 272N000001 HC OR GENERAL SUPPLY STERILE: Performed by: ORTHOPAEDIC SURGERY

## 2024-12-02 PROCEDURE — 250N000013 HC RX MED GY IP 250 OP 250 PS 637: Performed by: PHYSICIAN ASSISTANT

## 2024-12-02 PROCEDURE — 01NB0ZZ RELEASE LUMBAR NERVE, OPEN APPROACH: ICD-10-PCS | Performed by: ORTHOPAEDIC SURGERY

## 2024-12-02 PROCEDURE — 85018 HEMOGLOBIN: CPT | Performed by: EMERGENCY MEDICINE

## 2024-12-02 PROCEDURE — 0SG00AJ FUSION OF LUMBAR VERTEBRAL JOINT WITH INTERBODY FUSION DEVICE, POSTERIOR APPROACH, ANTERIOR COLUMN, OPEN APPROACH: ICD-10-PCS | Performed by: ORTHOPAEDIC SURGERY

## 2024-12-02 PROCEDURE — 250N000011 HC RX IP 250 OP 636: Performed by: ORTHOPAEDIC SURGERY

## 2024-12-02 PROCEDURE — 00QT0ZZ REPAIR SPINAL MENINGES, OPEN APPROACH: ICD-10-PCS | Performed by: ORTHOPAEDIC SURGERY

## 2024-12-02 PROCEDURE — 0SB20ZZ EXCISION OF LUMBAR VERTEBRAL DISC, OPEN APPROACH: ICD-10-PCS | Performed by: ORTHOPAEDIC SURGERY

## 2024-12-02 PROCEDURE — C1713 ANCHOR/SCREW BN/BN,TIS/BN: HCPCS | Performed by: ORTHOPAEDIC SURGERY

## 2024-12-02 PROCEDURE — 36415 COLL VENOUS BLD VENIPUNCTURE: CPT | Performed by: EMERGENCY MEDICINE

## 2024-12-02 PROCEDURE — 85018 HEMOGLOBIN: CPT | Performed by: STUDENT IN AN ORGANIZED HEALTH CARE EDUCATION/TRAINING PROGRAM

## 2024-12-02 PROCEDURE — 999N000141 HC STATISTIC PRE-PROCEDURE NURSING ASSESSMENT: Performed by: ORTHOPAEDIC SURGERY

## 2024-12-02 PROCEDURE — 360N000085 HC SURGERY LEVEL 5 W/ FLUORO, PER MIN: Performed by: ORTHOPAEDIC SURGERY

## 2024-12-02 PROCEDURE — 250N000013 HC RX MED GY IP 250 OP 250 PS 637: Performed by: ANESTHESIOLOGY

## 2024-12-02 PROCEDURE — 710N000010 HC RECOVERY PHASE 1, LEVEL 2, PER MIN: Performed by: ORTHOPAEDIC SURGERY

## 2024-12-02 PROCEDURE — 370N000017 HC ANESTHESIA TECHNICAL FEE, PER MIN: Performed by: ORTHOPAEDIC SURGERY

## 2024-12-02 PROCEDURE — 258N000003 HC RX IP 258 OP 636: Performed by: STUDENT IN AN ORGANIZED HEALTH CARE EDUCATION/TRAINING PROGRAM

## 2024-12-02 PROCEDURE — 250N000011 HC RX IP 250 OP 636: Performed by: STUDENT IN AN ORGANIZED HEALTH CARE EDUCATION/TRAINING PROGRAM

## 2024-12-02 PROCEDURE — 250N000011 HC RX IP 250 OP 636: Performed by: ANESTHESIOLOGY

## 2024-12-02 DEVICE — IMP ROD MEDT TSRH PREBENT 04.0CM 8370040: Type: IMPLANTABLE DEVICE | Site: SPINE LUMBAR | Status: FUNCTIONAL

## 2024-12-02 DEVICE — IMP CONNECTOR MEDT TSRH 3DX SMALL 8351520: Type: IMPLANTABLE DEVICE | Site: SPINE LUMBAR | Status: FUNCTIONAL

## 2024-12-02 DEVICE — ALLOGRAFT DURAGEN PLUS 1 X 1 DP-1011: Type: IMPLANTABLE DEVICE | Site: SPINE LUMBAR | Status: FUNCTIONAL

## 2024-12-02 DEVICE — IMP SCR MEDT TSRH 3DX OG THIN 6.5X45MM TI 83565459: Type: IMPLANTABLE DEVICE | Site: SPINE LUMBAR | Status: FUNCTIONAL

## 2024-12-02 DEVICE — IMP ROD MEDT TSRH PREBENT 03.5CM 8370035: Type: IMPLANTABLE DEVICE | Site: SPINE LUMBAR | Status: FUNCTIONAL

## 2024-12-02 DEVICE — IMP SCR SET MEDT TSRH 3DX FLUSH BREAK 8351500: Type: IMPLANTABLE DEVICE | Site: SPINE LUMBAR | Status: FUNCTIONAL

## 2024-12-02 DEVICE — IMP SPI INTERBODY MEDT CATALYFT PL LONG 7MM 6068076: Type: IMPLANTABLE DEVICE | Site: SPINE LUMBAR | Status: FUNCTIONAL

## 2024-12-02 RX ORDER — ONDANSETRON 2 MG/ML
INJECTION INTRAMUSCULAR; INTRAVENOUS PRN
Status: DISCONTINUED | OUTPATIENT
Start: 2024-12-02 | End: 2024-12-02

## 2024-12-02 RX ORDER — GABAPENTIN 100 MG/1
100 CAPSULE ORAL AT BEDTIME
Status: DISCONTINUED | OUTPATIENT
Start: 2024-12-02 | End: 2024-12-06 | Stop reason: HOSPADM

## 2024-12-02 RX ORDER — BISACODYL 10 MG
10 SUPPOSITORY, RECTAL RECTAL DAILY PRN
Status: DISCONTINUED | OUTPATIENT
Start: 2024-12-02 | End: 2024-12-06 | Stop reason: HOSPADM

## 2024-12-02 RX ORDER — DEXAMETHASONE SODIUM PHOSPHATE 4 MG/ML
4 INJECTION, SOLUTION INTRA-ARTICULAR; INTRALESIONAL; INTRAMUSCULAR; INTRAVENOUS; SOFT TISSUE
Status: DISCONTINUED | OUTPATIENT
Start: 2024-12-02 | End: 2024-12-02 | Stop reason: HOSPADM

## 2024-12-02 RX ORDER — DEXAMETHASONE SODIUM PHOSPHATE 4 MG/ML
INJECTION, SOLUTION INTRA-ARTICULAR; INTRALESIONAL; INTRAMUSCULAR; INTRAVENOUS; SOFT TISSUE PRN
Status: DISCONTINUED | OUTPATIENT
Start: 2024-12-02 | End: 2024-12-02

## 2024-12-02 RX ORDER — HYDROMORPHONE HCL IN WATER/PF 6 MG/30 ML
0.4 PATIENT CONTROLLED ANALGESIA SYRINGE INTRAVENOUS
Status: DISCONTINUED | OUTPATIENT
Start: 2024-12-02 | End: 2024-12-06 | Stop reason: HOSPADM

## 2024-12-02 RX ORDER — FENTANYL CITRATE 50 UG/ML
50 INJECTION, SOLUTION INTRAMUSCULAR; INTRAVENOUS EVERY 5 MIN PRN
Status: DISCONTINUED | OUTPATIENT
Start: 2024-12-02 | End: 2024-12-02 | Stop reason: HOSPADM

## 2024-12-02 RX ORDER — NALOXONE HYDROCHLORIDE 0.4 MG/ML
0.4 INJECTION, SOLUTION INTRAMUSCULAR; INTRAVENOUS; SUBCUTANEOUS
Status: DISCONTINUED | OUTPATIENT
Start: 2024-12-02 | End: 2024-12-06 | Stop reason: HOSPADM

## 2024-12-02 RX ORDER — FENTANYL CITRATE 50 UG/ML
25 INJECTION, SOLUTION INTRAMUSCULAR; INTRAVENOUS EVERY 5 MIN PRN
Status: DISCONTINUED | OUTPATIENT
Start: 2024-12-02 | End: 2024-12-02 | Stop reason: HOSPADM

## 2024-12-02 RX ORDER — CEFAZOLIN SODIUM 1 G/3ML
1 INJECTION, POWDER, FOR SOLUTION INTRAMUSCULAR; INTRAVENOUS EVERY 8 HOURS
Status: COMPLETED | OUTPATIENT
Start: 2024-12-02 | End: 2024-12-03

## 2024-12-02 RX ORDER — CEFAZOLIN SODIUM/WATER 2 G/20 ML
2 SYRINGE (ML) INTRAVENOUS
Status: COMPLETED | OUTPATIENT
Start: 2024-12-02 | End: 2024-12-02

## 2024-12-02 RX ORDER — LORATADINE 10 MG/1
10 TABLET ORAL DAILY PRN
Status: DISCONTINUED | OUTPATIENT
Start: 2024-12-02 | End: 2024-12-06 | Stop reason: HOSPADM

## 2024-12-02 RX ORDER — PROPOFOL 10 MG/ML
INJECTION, EMULSION INTRAVENOUS PRN
Status: DISCONTINUED | OUTPATIENT
Start: 2024-12-02 | End: 2024-12-02

## 2024-12-02 RX ORDER — AMOXICILLIN 250 MG
1 CAPSULE ORAL 2 TIMES DAILY
Status: DISCONTINUED | OUTPATIENT
Start: 2024-12-02 | End: 2024-12-06 | Stop reason: HOSPADM

## 2024-12-02 RX ORDER — ONDANSETRON 2 MG/ML
4 INJECTION INTRAMUSCULAR; INTRAVENOUS EVERY 30 MIN PRN
Status: DISCONTINUED | OUTPATIENT
Start: 2024-12-02 | End: 2024-12-02 | Stop reason: HOSPADM

## 2024-12-02 RX ORDER — PANTOPRAZOLE SODIUM 40 MG/1
40 TABLET, DELAYED RELEASE ORAL EVERY MORNING
Status: DISCONTINUED | OUTPATIENT
Start: 2024-12-03 | End: 2024-12-06 | Stop reason: HOSPADM

## 2024-12-02 RX ORDER — ACETAMINOPHEN 325 MG/1
650 TABLET ORAL EVERY 4 HOURS PRN
Status: DISCONTINUED | OUTPATIENT
Start: 2024-12-05 | End: 2024-12-06 | Stop reason: HOSPADM

## 2024-12-02 RX ORDER — VANCOMYCIN HYDROCHLORIDE 1 G/20ML
1 INJECTION, POWDER, LYOPHILIZED, FOR SOLUTION INTRAVENOUS
Status: DISCONTINUED | OUTPATIENT
Start: 2024-12-02 | End: 2024-12-02 | Stop reason: HOSPADM

## 2024-12-02 RX ORDER — NALOXONE HYDROCHLORIDE 0.4 MG/ML
0.1 INJECTION, SOLUTION INTRAMUSCULAR; INTRAVENOUS; SUBCUTANEOUS
Status: DISCONTINUED | OUTPATIENT
Start: 2024-12-02 | End: 2024-12-02 | Stop reason: HOSPADM

## 2024-12-02 RX ORDER — HYDROMORPHONE HCL IN WATER/PF 6 MG/30 ML
0.2 PATIENT CONTROLLED ANALGESIA SYRINGE INTRAVENOUS EVERY 5 MIN PRN
Status: DISCONTINUED | OUTPATIENT
Start: 2024-12-02 | End: 2024-12-02 | Stop reason: HOSPADM

## 2024-12-02 RX ORDER — VASOPRESSIN IN 0.9 % NACL 2 UNIT/2ML
SYRINGE (ML) INTRAVENOUS PRN
Status: DISCONTINUED | OUTPATIENT
Start: 2024-12-02 | End: 2024-12-02

## 2024-12-02 RX ORDER — SODIUM CHLORIDE 9 MG/ML
INJECTION, SOLUTION INTRAVENOUS CONTINUOUS PRN
Status: DISCONTINUED | OUTPATIENT
Start: 2024-12-02 | End: 2024-12-02

## 2024-12-02 RX ORDER — HYDROXYZINE HYDROCHLORIDE 10 MG/1
10 TABLET, FILM COATED ORAL EVERY 6 HOURS PRN
Status: DISCONTINUED | OUTPATIENT
Start: 2024-12-02 | End: 2024-12-06 | Stop reason: HOSPADM

## 2024-12-02 RX ORDER — HEPARIN SOD,PORCINE/0.9 % NACL 30K/1000ML
INTRAVENOUS SOLUTION INTRAVENOUS ONCE
Status: COMPLETED | OUTPATIENT
Start: 2024-12-02 | End: 2024-12-02

## 2024-12-02 RX ORDER — HYDROMORPHONE HCL IN WATER/PF 6 MG/30 ML
0.2 PATIENT CONTROLLED ANALGESIA SYRINGE INTRAVENOUS
Status: DISCONTINUED | OUTPATIENT
Start: 2024-12-02 | End: 2024-12-06 | Stop reason: HOSPADM

## 2024-12-02 RX ORDER — NALOXONE HYDROCHLORIDE 0.4 MG/ML
0.2 INJECTION, SOLUTION INTRAMUSCULAR; INTRAVENOUS; SUBCUTANEOUS
Status: DISCONTINUED | OUTPATIENT
Start: 2024-12-02 | End: 2024-12-06 | Stop reason: HOSPADM

## 2024-12-02 RX ORDER — POLYETHYLENE GLYCOL 3350 17 G/17G
17 POWDER, FOR SOLUTION ORAL DAILY
Status: DISCONTINUED | OUTPATIENT
Start: 2024-12-03 | End: 2024-12-06 | Stop reason: HOSPADM

## 2024-12-02 RX ORDER — FENTANYL CITRATE 50 UG/ML
INJECTION, SOLUTION INTRAMUSCULAR; INTRAVENOUS PRN
Status: DISCONTINUED | OUTPATIENT
Start: 2024-12-02 | End: 2024-12-02

## 2024-12-02 RX ORDER — PROCHLORPERAZINE MALEATE 5 MG/1
5 TABLET ORAL EVERY 6 HOURS PRN
Status: DISCONTINUED | OUTPATIENT
Start: 2024-12-02 | End: 2024-12-06 | Stop reason: HOSPADM

## 2024-12-02 RX ORDER — MULTIVITAMIN,THERAPEUTIC
1 TABLET ORAL DAILY
Status: DISCONTINUED | OUTPATIENT
Start: 2024-12-03 | End: 2024-12-06 | Stop reason: HOSPADM

## 2024-12-02 RX ORDER — LIDOCAINE HYDROCHLORIDE 10 MG/ML
INJECTION, SOLUTION INFILTRATION; PERINEURAL PRN
Status: DISCONTINUED | OUTPATIENT
Start: 2024-12-02 | End: 2024-12-02

## 2024-12-02 RX ORDER — ACETAMINOPHEN 325 MG/1
975 TABLET ORAL ONCE
Status: COMPLETED | OUTPATIENT
Start: 2024-12-02 | End: 2024-12-02

## 2024-12-02 RX ORDER — LORAZEPAM 2 MG/ML
.5-1 INJECTION INTRAMUSCULAR
Status: DISCONTINUED | OUTPATIENT
Start: 2024-12-02 | End: 2024-12-02 | Stop reason: HOSPADM

## 2024-12-02 RX ORDER — LIDOCAINE 40 MG/G
CREAM TOPICAL
Status: DISCONTINUED | OUTPATIENT
Start: 2024-12-02 | End: 2024-12-02 | Stop reason: HOSPADM

## 2024-12-02 RX ORDER — CITALOPRAM HYDROBROMIDE 20 MG/1
20 TABLET ORAL DAILY
Status: DISCONTINUED | OUTPATIENT
Start: 2024-12-03 | End: 2024-12-06 | Stop reason: HOSPADM

## 2024-12-02 RX ORDER — ONDANSETRON 4 MG/1
4 TABLET, ORALLY DISINTEGRATING ORAL EVERY 30 MIN PRN
Status: DISCONTINUED | OUTPATIENT
Start: 2024-12-02 | End: 2024-12-02 | Stop reason: HOSPADM

## 2024-12-02 RX ORDER — VANCOMYCIN HYDROCHLORIDE 1 G/20ML
INJECTION, POWDER, LYOPHILIZED, FOR SOLUTION INTRAVENOUS PRN
Status: DISCONTINUED | OUTPATIENT
Start: 2024-12-02 | End: 2024-12-02 | Stop reason: HOSPADM

## 2024-12-02 RX ORDER — OXYCODONE HYDROCHLORIDE 5 MG/1
5 TABLET ORAL EVERY 4 HOURS PRN
Status: DISCONTINUED | OUTPATIENT
Start: 2024-12-02 | End: 2024-12-06 | Stop reason: HOSPADM

## 2024-12-02 RX ORDER — ONDANSETRON 2 MG/ML
4 INJECTION INTRAMUSCULAR; INTRAVENOUS EVERY 6 HOURS PRN
Status: DISCONTINUED | OUTPATIENT
Start: 2024-12-02 | End: 2024-12-06 | Stop reason: HOSPADM

## 2024-12-02 RX ORDER — HYDROMORPHONE HCL IN WATER/PF 6 MG/30 ML
0.4 PATIENT CONTROLLED ANALGESIA SYRINGE INTRAVENOUS EVERY 5 MIN PRN
Status: DISCONTINUED | OUTPATIENT
Start: 2024-12-02 | End: 2024-12-02 | Stop reason: HOSPADM

## 2024-12-02 RX ORDER — ACETAMINOPHEN 325 MG/1
975 TABLET ORAL EVERY 8 HOURS
Status: COMPLETED | OUTPATIENT
Start: 2024-12-02 | End: 2024-12-05

## 2024-12-02 RX ORDER — CEFAZOLIN SODIUM/WATER 2 G/20 ML
2 SYRINGE (ML) INTRAVENOUS SEE ADMIN INSTRUCTIONS
Status: DISCONTINUED | OUTPATIENT
Start: 2024-12-02 | End: 2024-12-02 | Stop reason: HOSPADM

## 2024-12-02 RX ORDER — ONDANSETRON 4 MG/1
4 TABLET, ORALLY DISINTEGRATING ORAL EVERY 6 HOURS PRN
Status: DISCONTINUED | OUTPATIENT
Start: 2024-12-02 | End: 2024-12-06 | Stop reason: HOSPADM

## 2024-12-02 RX ORDER — GLYCOPYRROLATE 0.2 MG/ML
INJECTION, SOLUTION INTRAMUSCULAR; INTRAVENOUS PRN
Status: DISCONTINUED | OUTPATIENT
Start: 2024-12-02 | End: 2024-12-02

## 2024-12-02 RX ORDER — OXYCODONE HYDROCHLORIDE 5 MG/1
10 TABLET ORAL EVERY 4 HOURS PRN
Status: DISCONTINUED | OUTPATIENT
Start: 2024-12-02 | End: 2024-12-06 | Stop reason: HOSPADM

## 2024-12-02 RX ORDER — MAGNESIUM SULFATE 4 G/50ML
4 INJECTION INTRAVENOUS ONCE
Status: COMPLETED | OUTPATIENT
Start: 2024-12-02 | End: 2024-12-02

## 2024-12-02 RX ORDER — SODIUM CHLORIDE, SODIUM LACTATE, POTASSIUM CHLORIDE, CALCIUM CHLORIDE 600; 310; 30; 20 MG/100ML; MG/100ML; MG/100ML; MG/100ML
INJECTION, SOLUTION INTRAVENOUS CONTINUOUS
Status: DISCONTINUED | OUTPATIENT
Start: 2024-12-02 | End: 2024-12-02 | Stop reason: HOSPADM

## 2024-12-02 RX ORDER — GABAPENTIN 100 MG/1
100 CAPSULE ORAL
Status: COMPLETED | OUTPATIENT
Start: 2024-12-02 | End: 2024-12-02

## 2024-12-02 RX ORDER — SODIUM CHLORIDE 9 MG/ML
INJECTION, SOLUTION INTRAVENOUS CONTINUOUS
Status: DISCONTINUED | OUTPATIENT
Start: 2024-12-02 | End: 2024-12-04

## 2024-12-02 RX ADMIN — ONDANSETRON 4 MG: 2 INJECTION INTRAMUSCULAR; INTRAVENOUS at 12:40

## 2024-12-02 RX ADMIN — PHENYLEPHRINE HYDROCHLORIDE 0.4 MCG/KG/MIN: 10 INJECTION INTRAVENOUS at 10:29

## 2024-12-02 RX ADMIN — ACETAMINOPHEN 975 MG: 325 TABLET ORAL at 08:42

## 2024-12-02 RX ADMIN — PHENYLEPHRINE HYDROCHLORIDE 200 MCG: 10 INJECTION INTRAVENOUS at 11:01

## 2024-12-02 RX ADMIN — PHENYLEPHRINE HYDROCHLORIDE 50 MCG: 10 INJECTION INTRAVENOUS at 11:24

## 2024-12-02 RX ADMIN — SENNOSIDES AND DOCUSATE SODIUM 1 TABLET: 50; 8.6 TABLET ORAL at 20:32

## 2024-12-02 RX ADMIN — MAGNESIUM SULFATE HEPTAHYDRATE 4 G: 80 INJECTION, SOLUTION INTRAVENOUS at 08:46

## 2024-12-02 RX ADMIN — Medication 200 MG: at 12:53

## 2024-12-02 RX ADMIN — PHENYLEPHRINE HYDROCHLORIDE 100 MCG: 10 INJECTION INTRAVENOUS at 12:37

## 2024-12-02 RX ADMIN — PHENYLEPHRINE HYDROCHLORIDE 100 MCG: 10 INJECTION INTRAVENOUS at 10:34

## 2024-12-02 RX ADMIN — OXYCODONE 5 MG: 5 TABLET ORAL at 16:24

## 2024-12-02 RX ADMIN — ALBUMIN (HUMAN): 12.5 SOLUTION INTRAVENOUS at 11:36

## 2024-12-02 RX ADMIN — SODIUM CHLORIDE: 9 INJECTION, SOLUTION INTRAVENOUS at 14:59

## 2024-12-02 RX ADMIN — PROPOFOL 150 MG: 10 INJECTION, EMULSION INTRAVENOUS at 10:15

## 2024-12-02 RX ADMIN — ACETAMINOPHEN 975 MG: 325 TABLET ORAL at 16:24

## 2024-12-02 RX ADMIN — Medication 0.5 UNITS: at 11:52

## 2024-12-02 RX ADMIN — ROCURONIUM BROMIDE 10 MG: 10 INJECTION, SOLUTION INTRAVENOUS at 11:16

## 2024-12-02 RX ADMIN — Medication 0.5 UNITS: at 11:59

## 2024-12-02 RX ADMIN — OXYCODONE 5 MG: 5 TABLET ORAL at 20:32

## 2024-12-02 RX ADMIN — GABAPENTIN 100 MG: 100 CAPSULE ORAL at 08:43

## 2024-12-02 RX ADMIN — ROCURONIUM BROMIDE 50 MG: 10 INJECTION, SOLUTION INTRAVENOUS at 10:15

## 2024-12-02 RX ADMIN — ALBUMIN (HUMAN): 12.5 SOLUTION INTRAVENOUS at 10:59

## 2024-12-02 RX ADMIN — FENTANYL CITRATE 100 MCG: 50 INJECTION INTRAMUSCULAR; INTRAVENOUS at 10:14

## 2024-12-02 RX ADMIN — PHENYLEPHRINE HYDROCHLORIDE 150 MCG: 10 INJECTION INTRAVENOUS at 11:33

## 2024-12-02 RX ADMIN — HYDROMORPHONE HYDROCHLORIDE 0.4 MG: 0.2 INJECTION, SOLUTION INTRAMUSCULAR; INTRAVENOUS; SUBCUTANEOUS at 23:26

## 2024-12-02 RX ADMIN — PHENYLEPHRINE HYDROCHLORIDE 50 MCG: 10 INJECTION INTRAVENOUS at 11:29

## 2024-12-02 RX ADMIN — Medication 0.5 UNITS: at 11:40

## 2024-12-02 RX ADMIN — PHENYLEPHRINE HYDROCHLORIDE 50 MCG: 10 INJECTION INTRAVENOUS at 11:22

## 2024-12-02 RX ADMIN — HYDROMORPHONE HYDROCHLORIDE 0.5 MG: 1 INJECTION, SOLUTION INTRAMUSCULAR; INTRAVENOUS; SUBCUTANEOUS at 11:13

## 2024-12-02 RX ADMIN — LIDOCAINE HYDROCHLORIDE 50 MG: 10 INJECTION, SOLUTION INFILTRATION; PERINEURAL at 10:14

## 2024-12-02 RX ADMIN — DEXAMETHASONE SODIUM PHOSPHATE 4 MG: 4 INJECTION, SOLUTION INTRA-ARTICULAR; INTRALESIONAL; INTRAMUSCULAR; INTRAVENOUS; SOFT TISSUE at 10:52

## 2024-12-02 RX ADMIN — GABAPENTIN 100 MG: 100 CAPSULE ORAL at 20:32

## 2024-12-02 RX ADMIN — Medication 0.5 UNITS: at 12:13

## 2024-12-02 RX ADMIN — SODIUM CHLORIDE: 0.9 INJECTION, SOLUTION INTRAVENOUS at 11:49

## 2024-12-02 RX ADMIN — PHENYLEPHRINE HYDROCHLORIDE 50 MCG: 10 INJECTION INTRAVENOUS at 11:23

## 2024-12-02 RX ADMIN — ALBUMIN (HUMAN): 12.5 SOLUTION INTRAVENOUS at 10:22

## 2024-12-02 RX ADMIN — GLYCOPYRROLATE 0.2 MG: 0.2 INJECTION INTRAMUSCULAR; INTRAVENOUS at 12:11

## 2024-12-02 RX ADMIN — CEFAZOLIN 1 G: 1 INJECTION, POWDER, FOR SOLUTION INTRAMUSCULAR; INTRAVENOUS at 18:26

## 2024-12-02 RX ADMIN — Medication 2 G: at 10:09

## 2024-12-02 RX ADMIN — ROCURONIUM BROMIDE 10 MG: 10 INJECTION, SOLUTION INTRAVENOUS at 12:01

## 2024-12-02 RX ADMIN — SODIUM CHLORIDE, POTASSIUM CHLORIDE, SODIUM LACTATE AND CALCIUM CHLORIDE: 600; 310; 30; 20 INJECTION, SOLUTION INTRAVENOUS at 08:38

## 2024-12-02 RX ADMIN — HYDROMORPHONE HYDROCHLORIDE 0.5 MG: 1 INJECTION, SOLUTION INTRAMUSCULAR; INTRAVENOUS; SUBCUTANEOUS at 13:08

## 2024-12-02 RX ADMIN — PHENYLEPHRINE HYDROCHLORIDE 50 MCG: 10 INJECTION INTRAVENOUS at 11:27

## 2024-12-02 ASSESSMENT — ACTIVITIES OF DAILY LIVING (ADL)
ADLS_ACUITY_SCORE: 21
ADLS_ACUITY_SCORE: 20
ADLS_ACUITY_SCORE: 32
ADLS_ACUITY_SCORE: 20
ADLS_ACUITY_SCORE: 17
ADLS_ACUITY_SCORE: 19
ADLS_ACUITY_SCORE: 17
ADLS_ACUITY_SCORE: 21
ADLS_ACUITY_SCORE: 17
ADLS_ACUITY_SCORE: 17
ADLS_ACUITY_SCORE: 20
ADLS_ACUITY_SCORE: 17

## 2024-12-02 ASSESSMENT — LIFESTYLE VARIABLES: TOBACCO_USE: 0

## 2024-12-02 NOTE — OP NOTE
DATE OF SERVICE: 12-2-24    PREOPERATIVE DIAGNOSES:   1. Severe spinal stenosis L4-5 with a degenerative spondylolisthesis.   2. Failure of extensive conservative measures.     POSTOPERATIVE DIAGNOSES:   same    PROCEDURE:   1.  Right-sided L4-5 transforaminal/transfacet decompression of the exiting L4 and   traversing L5 nerve root.   2. Transforaminal lumbar interbody fusion L4-5 with autograft bone from the facet   joint and placement of a Medtronic Catalyft  cage 7mm height,  27.3 mm length cage.   3.  Left-sided posterior spinal fusion with decortication of the transverse   processes, the facet joint and the lamina.   4. Pedicle screw fixation in the pedicles of L4 and L5 bilaterally.     SURGEON: Dr. Edward Winchester.     ASSISTANT: Faraz Holder PA-C who was needed for patient positioning, soft tissue   retraction, patient safety and assistance with closure of the wound.     ESTIMATED BLOOD LOSS: 750cc     DRAINS: None.     COMPLICATIONS: Incidental durotomy while removing the cyst    SPECIMENS SENT: None.     HISTORY OF PRESENT ILLNESS AND INDICATIONS FOR PROCEDURE:     This is a pleasant 79-year-old pt who has had pain in the right leg for months now. We discussed risks benefits options and alternatives to surgery.  She had a massive facet cyst on the right which was severely compressive.  We discussed that the fusion would be necessary along with the decompression facetectomy surgery.  We also discussed the risks of surgery including but not limited to, bleeding, infection, nerve damage, failure of the procedure to relieve symptoms, iatrogenic instability, errant instrumentation placement requiring revision or repair, DVT, PE, blindness and even death.    Therefore, the patient was seen in the preop area of [facility] today. Low back was marked and the consent was again reverified. Pt was brought to the operating room, intubated via general endotracheal anesthetic and   placed on a Vince table with a  Marco frame with care taken to pad all bony   prominences. Pause for the Cause was performed correctly identifying the patient,   procedure, proposed plan and radiographs and all were in agreement. We then prepped   and draped the patient in a standard fashion for a lumbar spine procedure. We   localized our incision and dissected down over the L4 hemilamina bilaterally. We performed a right L4 hemilaminotomy and then scored the pars intraarticularis. We used an osteotome to remove the descending   articular process of L4 flush with the pedicle. We then removed the ascending   articular process of L5 flush with the pedicle which gave us our   transforaminal/transfacet decompression of the exiting L4 and traversing L5 nerve   roots.  The cyst was absolutely massive.  It was severely compressive and densely adherent.  We gently removed multiple pieces.  As we were removing the cyst from underneath the dura we did get a small durotomy.  We repaired it to a watertight seal.  We then performed a box annulotomy at L4-5, used a combination of ODC curettes, pituitary rongeurs and   Kerrison rongeurs to remove the disk material. We gently decorticated the   endplates and placed autograft bone from our facetectomy in the disk space. We then   placed our Medtronic cage. It had good fit and fill.  We then performed a full laminectomy on the left side and partial medial facetectomy at L4-5.    We then turned our attention to placing our pedicle screws. We then used a Midas   Bulmaro landon for our starting point. Then used a pedicle probe to cannulate the   pedicle. We then used a Reyes probe to palpate our cannulated pedicle to make sure   there were no breaches. We then used a 5.5 mm tap again, using our Reyes probe to   palpate our cannulated pedicle. We placed 6.5 x45 screws in the pedicles of L4 and   L5 bilaterally. All screws had good position on PA and lateral x-ray. We then decorticated our left-sided transverse   processes,  facet joint and lamina, and placed autograft bone . We then placed our 4 small connectors and two rods which were tightened to 's specifications. we then irrigated the wound copiously, placed vancomycin powder  We did place a DuraGen patch and Tisseel over the top.   in the wound and closed the deep fascia with #1 Vicryl, subcutaneous tissue with 2-0 Vicryl, skin with 3-0 Vicryl. Sterile dressing was applied.     The patient tolerated procedure well. There were no apparent complications. She will be weightbearing as tolerated bilateral lower extremities with strict instructions to avoid bending, lifting and twisting over the next 6 weeks. She   will have early mobilization and KATELYNN stockings for DVT prophylaxis and 2 grams of Ancef IV q.8 hours x2 doses for antibiotics. Return to see me in 6 weeks, sooner if there are any problems, questions or concerns.   We will keep her on flat bedrest for 24 hours    Instrumentation used for this case was a Medtronic TSRH 3Dx screws 6.5 x45 with 4   small connectors and two rods.

## 2024-12-02 NOTE — PROVIDER NOTIFICATION
Okay per Dr. Goldberg for patient to transfer to inpatient floor with STAT hgb result 10.6. Writer verbalized understanding.

## 2024-12-02 NOTE — PHARMACY-ADMISSION MEDICATION HISTORY
Pharmacist KAYLA Medication History    Admission medication history is complete. The information provided in this note is only as accurate as the sources available at the time of the update.    Medication reconciliation/reorder completed by provider prior to medication history? No    Information Source(s): Patient and Clinic records and Care Everywhere via in-person    Pertinent Information: N/A    Allergies reviewed with patient and updates made in EHR: yes    Medications available for use during hospital stay: None.      Medication History Completed By: Jhonathan Jacome RPH 12/2/2024 8:42 AM    Current Facility-Administered Medications for the 12/2/24 encounter (Hospital Encounter)   Medication    denosumab (PROLIA) injection 60 mg     PTA Med List   Medication Sig Last Dose/Taking    acetaminophen (TYLENOL) 500 MG tablet Take 1,000 mg by mouth every 6 hours as needed for mild pain or fever. Unknown    amoxicillin (AMOXIL) 500 MG capsule Take 2,000 mg by mouth as needed (prior to dental procedures). Unknown    CALCIUM CITRATE/VITAMIN D3 (CALCIUM CITRATE + D ORAL) Take 2 tablets by mouth 2 times daily. Past Week    cholecalciferol, vitamin D3, (VITAMIN D3) 2,000 unit Tab Take 1 tablet by mouth daily. Past Week    citalopram (CELEXA) 20 MG tablet [CITALOPRAM (CELEXA) 20 MG TABLET] Take 20 mg by mouth daily. 12/2/2024 Morning    multivitamin (MULTIVITAMIN) per tablet [MULTIVITAMIN (MULTIVITAMIN) PER TABLET] Take 1 tablet by mouth daily.  Past Week    omeprazole (PRILOSEC) 20 MG capsule [OMEPRAZOLE (PRILOSEC) 20 MG CAPSULE] Take 20 mg by mouth daily before breakfast. 12/2/2024 Morning

## 2024-12-02 NOTE — ANESTHESIA CARE TRANSFER NOTE
Patient: Pam Chino    Procedure: Procedure(s):  RIGHT LUMBAR 4-5 TRANSFORAMINAL LUMBAR INTERBODY FUSION       Diagnosis: Lumbar radicular pain [M54.16]  Lumbar stenosis [M48.061]  Diagnosis Additional Information: No value filed.    Anesthesia Type:   General     Note:    Oropharynx: oropharynx clear of all foreign objects and spontaneously breathing  Level of Consciousness: drowsy  Oxygen Supplementation: face mask  Level of Supplemental Oxygen (L/min / FiO2): 8  Independent Airway: airway patency satisfactory and stable  Dentition: dentition unchanged  Vital Signs Stable: post-procedure vital signs reviewed and stable  Report to RN Given: handoff report given  Patient transferred to: PACU    Handoff Report: Identifed the Patient, Identified the Reponsible Provider, Reviewed the pertinent medical history, Discussed the surgical course, Reviewed Intra-OP anesthesia mangement and issues during anesthesia, Set expectations for post-procedure period and Allowed opportunity for questions and acknowledgement of understanding      Vitals:  Vitals Value Taken Time   /56 12/02/24 1310   Temp 37.1  C (98.7  F) 12/02/24 1305   Pulse 92 12/02/24 1310   Resp 14 12/02/24 1310   SpO2 100 % 12/02/24 1310   Vitals shown include unfiled device data.    Electronically Signed By: AVA Santiago CRNA  December 2, 2024  1:12 PM

## 2024-12-02 NOTE — CONSULTS
[facility] MEDICINE CONSULT NOTE   Physician requesting consult: Vidal Winchester MD    Reason for consult: Postoperative medical management of medical co-morbidities as below    Assessment and Plan      79 year old female into [facility] on 12/2/2024 after arriving for an elective  L4-5 decompression due to chronic severe spinal stenosis. Pt had general  Anesthesia with an EBL of 1200 mls.      [facility] [place] to evaluate patient for postoperative medical management as follows below. Please resume the home medications as reconciled and further noted below with ordered hold parameters.  Vital signs have been stable post-operatively including hemodynamically stable blood pressure and heart rate. Thank you for this consult; we will continue to follow this patient until discharge.    Problem list:    POD #0 L4-5 decompression with cyst removal along with fusion:     2.  Intraoperative blood loss: Postop hemoglobin 10.6; recheck at 1700 today;   Transfuse if hemoglobin less than 7  3.  Dural tear, intraoperative: Per surgery team patient will of bedrest till 1300 tomorrow  4.  Mood disorder: Continue Celexa  5.  DVT prevention: Early mobilization, KATELYNN stockings after bedrest is lifted tomorrow  6.  Disposition: pending post op clinical performance       -Reviewed the patient's preoperative H and P and updated missing elements.  -Home medication reconciliation has been reviewed.  Medications have been ordered as noted from the home list and changes are documented above     HISTORY       Past Medical History     Patient Active Problem List    Diagnosis Date Noted     Lumbar spinal stenosis 12/02/2024     Priority: Medium     History of patellar fracture 11/22/2022     Priority: Medium     Benign Essential Hypertension      Priority: Medium     Created by Conversion         Osteoporosis Senile      Priority: Medium     Created by Conversion         Depression      Priority: Medium      "Anxiety      Priority: Medium     GERD (gastroesophageal reflux disease)      Priority: Medium     Osteoarthritis of right knee 2015     Priority: Medium        Surgical History   She  has a past surgical history that includes other surgical history (1997); Mohs micrographic procedure (Left, ); Skin Cancer Excision; other surgical history (Oct. 2015); Colonoscopy; Upper Gastrointestinal Endoscopy; and TOTAL KNEE ARTHROPLASTY (Right, 2015).     Past Surgical History:   Procedure Laterality Date     COLONOSCOPY       MOHS MICROGRAPHIC PROCEDURE Left     BCE distal calf     OTHER SURGICAL HISTORY  1997    laser cone biopsy     OTHER SURGICAL HISTORY  Oct. 2015    nose bxCA     SKIN CANCER EXCISION      maru. leg- in office     UPPER GASTROINTESTINAL ENDOSCOPY       ZZC TOTAL KNEE ARTHROPLASTY Right 2015    Procedure: RIGHT KNEE TOTAL ARTHROPLASTY;  Surgeon: Benny Lopez MD;  Location: [facility];  Service: Orthopedics       Family History    Reviewed, and family history includes Chronic Obstructive Pulmonary Disease in her father.    Social History    Reviewed, and she  reports that she quit smoking about 44 years ago. Her smoking use included cigarettes. She started smoking about 49 years ago. She has been exposed to tobacco smoke. She has never used smokeless tobacco. She reports current alcohol use. She reports that she does not use drugs.  Social History     Tobacco Use     Smoking status: Former     Current packs/day: 0.00     Types: Cigarettes     Start date: 11/10/1975     Quit date: 11/10/1980     Years since quittin.0     Passive exposure: Past     Smokeless tobacco: Never     Tobacco comments:     \"socially\"   Substance Use Topics     Alcohol use: Yes     Comment: Alcoholic Drinks/day: social       Allergies     Allergies   Allergen Reactions     Ibuprofen Other (See Comments)     Stomach Bleed      Other Environmental Allergy Other (See Comments)     " Bandaids cause redness       Prior to Admission Medications      Facility-Administered Medications Prior to Admission   Medication Dose Route Frequency Provider Last Rate Last Admin     denosumab (PROLIA) injection 60 mg  60 mg Subcutaneous Q6 Months    60 mg at 06/11/24 0831     Medications Prior to Admission   Medication Sig Dispense Refill Last Dose/Taking     acetaminophen (TYLENOL) 500 MG tablet Take 1,000 mg by mouth every 6 hours as needed for mild pain or fever.   Unknown     amoxicillin (AMOXIL) 500 MG capsule Take 2,000 mg by mouth as needed (prior to dental procedures).  8 Unknown     CALCIUM CITRATE/VITAMIN D3 (CALCIUM CITRATE + D ORAL) Take 2 tablets by mouth 2 times daily.   Past Week     cholecalciferol, vitamin D3, (VITAMIN D3) 2,000 unit Tab Take 1 tablet by mouth daily.   Past Week     citalopram (CELEXA) 20 MG tablet [CITALOPRAM (CELEXA) 20 MG TABLET] Take 20 mg by mouth daily.   12/2/2024 Morning     multivitamin (MULTIVITAMIN) per tablet [MULTIVITAMIN (MULTIVITAMIN) PER TABLET] Take 1 tablet by mouth daily.    Past Week     omeprazole (PRILOSEC) 20 MG capsule [OMEPRAZOLE (PRILOSEC) 20 MG CAPSULE] Take 20 mg by mouth daily before breakfast.   12/2/2024 Morning       Review of Systems   A 12 point comprehensive review of systems was negative except as noted above.    OBJECTIVE         Physical Exam   Temp:  [97.8  F (36.6  C)-98.7  F (37.1  C)] 98.7  F (37.1  C)  Pulse:  [] 89  Resp:  [11-16] 12  BP: ()/(54-94) 97/54  SpO2:  [98 %-100 %] 98 %  Body mass index is 22.96 kg/m .    GENERAL:  Awake, alert, oriented, flat on her back   HEAD:  Normocephalic, without obvious abnormality, atraumatic   EYES:  PERRL, conjunctiva/corneas clear, no scleral icterus, EOM's intact   NOSE: Nares normal, septum midline, mucosa normal, no drainage   THROAT: Lips, mucosa, and tongue normal; teeth and gums normal, mouth moist   NECK: Supple, symmetrical, trachea midline   BACK:   Postop dressing not pulled    LUNGS:   Clear to auscultation bilaterally, no rales, rhonchi, or wheezing, symmetric chest rise on inhalation, respirations unlabored   CHEST WALL:  No tenderness or deformity   HEART:  Regular rate and rhythm, S1 and S2 normal, no murmur, rub, or gallop    ABDOMEN:   Soft, non-tender, bowel sounds active all four quadrants, no masses, no organomegaly, no rebound or guarding   EXTREMITIES: Bilateral pink warm feet   SKIN: Dry to touch, no exanthems in the visualized areas   NEURO: Awake alert, cranial nerves intact, motor and sensory grossly intact   PSYCH: Cooperative, behavior is appropriate       Imaging Reviewed Personally By Myself    Radiology Results:   Recent Results (from the past 24 hours)   XR Surgery LILIAN  Fluoro G/T 5 Min    Narrative    This exam was marked as non-reportable because it will not be read by a   radiologist or a [facility] non-radiologist provider.             Labs Reviewed Personally By Myself     Results for orders placed or performed during the hospital encounter of 12/02/24 (from the past 24 hours)   Glucose by meter   Result Value Ref Range    GLUCOSE BY METER POCT 110 (H) 70 - 99 mg/dL   XR Surgery LILIAN  Fluoro G/T 5 Min    Narrative    This exam was marked as non-reportable because it will not be read by a   radiologist or a [facility] non-radiologist provider.         Hemoglobin   Result Value Ref Range    Hemoglobin 10.6 (L) 11.7 - 15.7 g/dL       Preoperative Labs Reviewed Personally By Myself     Thank you for this consultation.  Appreciate the opportunity to participate in the care of Pam Chino, please feel free to contact us for any questions or concerns.    Sarita Whitfield MD  [facility]  Phone: #[phone]

## 2024-12-02 NOTE — ANESTHESIA PROCEDURE NOTES
Airway       Patient location during procedure: OR       Procedure Start/Stop Times: 12/2/2024 10:18 AM  Staff -        Anesthesiologist:  Goldberg, Leslie, MD       CRNA: Miriam Vega APRN CRNA       Performed By: CRNA  Consent for Airway        Urgency: elective  Indications and Patient Condition       Indications for airway management: maximino-procedural       Induction type:intravenous       Mask difficulty assessment: 1 - vent by mask    Final Airway Details       Final airway type: endotracheal airway       Successful airway: ETT - single  Endotracheal Airway Details        ETT size (mm): 7.0       Cuffed: yes       Successful intubation technique: direct laryngoscopy       DL Blade Type: MAC 4       Grade View of Cords: 1       Adjucts: stylet       Position: Right       Measured from: gums/teeth       Secured at (cm): 22       Bite block used: Soft    Post intubation assessment        Placement verified by: capnometry, equal breath sounds and chest rise        Number of attempts at approach: 1       Number of other approaches attempted: 0       Secured with: tape       Ease of procedure: easy       Dentition: Intact and Unchanged    Medication(s) Administered   Medication Administration Time: 12/2/2024 10:18 AM

## 2024-12-02 NOTE — ANESTHESIA POSTPROCEDURE EVALUATION
Patient: Pam Chino    Procedure: Procedure(s):  RIGHT LUMBAR 4-5 TRANSFORAMINAL LUMBAR INTERBODY FUSION       Anesthesia Type:  General    Note:     Postop Pain Control: Uneventful            Sign Out: Well controlled pain   PONV: No   Neuro/Psych: Uneventful            Sign Out: Acceptable/Baseline neuro status   Airway/Respiratory: Uneventful            Sign Out: Acceptable/Baseline resp. status   CV/Hemodynamics: Uneventful            Sign Out: Acceptable CV status; No obvious hypovolemia; No obvious fluid overload   Other NRE: NONE   DID A NON-ROUTINE EVENT OCCUR? No           Last vitals:  Vitals Value Taken Time   /56 12/02/24 1420   Temp 37.1  C (98.7  F) 12/02/24 1305   Pulse 92 12/02/24 1420   Resp 12 12/02/24 1420   SpO2 100 % 12/02/24 1420   Vitals shown include unfiled device data.    Electronically Signed By: Leslie Goldberg, MD  December 2, 2024  4:34 PM

## 2024-12-02 NOTE — OR NURSING
Cell saver infusion completed. Patient received 500mL sodium chloride 0.9% with completion of infusion.

## 2024-12-03 ENCOUNTER — APPOINTMENT (OUTPATIENT)
Dept: OCCUPATIONAL THERAPY | Facility: CLINIC | Age: 79
DRG: 402 | End: 2024-12-03
Attending: ORTHOPAEDIC SURGERY
Payer: COMMERCIAL

## 2024-12-03 PROCEDURE — 250N000013 HC RX MED GY IP 250 OP 250 PS 637: Performed by: PHYSICIAN ASSISTANT

## 2024-12-03 PROCEDURE — 97166 OT EVAL MOD COMPLEX 45 MIN: CPT | Mod: GO

## 2024-12-03 PROCEDURE — 250N000011 HC RX IP 250 OP 636: Performed by: PHYSICIAN ASSISTANT

## 2024-12-03 PROCEDURE — 120N000001 HC R&B MED SURG/OB

## 2024-12-03 PROCEDURE — 99231 SBSQ HOSP IP/OBS SF/LOW 25: CPT | Performed by: EMERGENCY MEDICINE

## 2024-12-03 PROCEDURE — 258N000003 HC RX IP 258 OP 636: Performed by: EMERGENCY MEDICINE

## 2024-12-03 PROCEDURE — 250N000013 HC RX MED GY IP 250 OP 250 PS 637: Performed by: EMERGENCY MEDICINE

## 2024-12-03 PROCEDURE — 97535 SELF CARE MNGMENT TRAINING: CPT | Mod: GO

## 2024-12-03 RX ADMIN — OXYCODONE 5 MG: 5 TABLET ORAL at 14:55

## 2024-12-03 RX ADMIN — ACETAMINOPHEN 975 MG: 325 TABLET ORAL at 01:49

## 2024-12-03 RX ADMIN — SODIUM CHLORIDE: 9 INJECTION, SOLUTION INTRAVENOUS at 19:53

## 2024-12-03 RX ADMIN — OXYCODONE 5 MG: 5 TABLET ORAL at 10:16

## 2024-12-03 RX ADMIN — ACETAMINOPHEN 975 MG: 325 TABLET ORAL at 18:53

## 2024-12-03 RX ADMIN — SODIUM CHLORIDE 500 ML: 9 INJECTION, SOLUTION INTRAVENOUS at 13:49

## 2024-12-03 RX ADMIN — SODIUM CHLORIDE: 9 INJECTION, SOLUTION INTRAVENOUS at 12:40

## 2024-12-03 RX ADMIN — GABAPENTIN 100 MG: 100 CAPSULE ORAL at 20:46

## 2024-12-03 RX ADMIN — POLYETHYLENE GLYCOL 3350 17 G: 17 POWDER, FOR SOLUTION ORAL at 09:20

## 2024-12-03 RX ADMIN — HYDROMORPHONE HYDROCHLORIDE 0.4 MG: 0.2 INJECTION, SOLUTION INTRAMUSCULAR; INTRAVENOUS; SUBCUTANEOUS at 04:09

## 2024-12-03 RX ADMIN — SODIUM CHLORIDE: 9 INJECTION, SOLUTION INTRAVENOUS at 01:54

## 2024-12-03 RX ADMIN — ACETAMINOPHEN 975 MG: 325 TABLET ORAL at 09:21

## 2024-12-03 RX ADMIN — CEFAZOLIN 1 G: 1 INJECTION, POWDER, FOR SOLUTION INTRAMUSCULAR; INTRAVENOUS at 01:54

## 2024-12-03 RX ADMIN — PROCHLORPERAZINE EDISYLATE 5 MG: 5 INJECTION INTRAMUSCULAR; INTRAVENOUS at 21:21

## 2024-12-03 RX ADMIN — ONDANSETRON 4 MG: 2 INJECTION INTRAMUSCULAR; INTRAVENOUS at 16:53

## 2024-12-03 RX ADMIN — SENNOSIDES AND DOCUSATE SODIUM 1 TABLET: 50; 8.6 TABLET ORAL at 20:46

## 2024-12-03 RX ADMIN — OXYCODONE 5 MG: 5 TABLET ORAL at 18:55

## 2024-12-03 RX ADMIN — OXYCODONE 10 MG: 5 TABLET ORAL at 02:08

## 2024-12-03 RX ADMIN — PANTOPRAZOLE SODIUM 40 MG: 40 TABLET, DELAYED RELEASE ORAL at 09:20

## 2024-12-03 RX ADMIN — THERA TABS 1 TABLET: TAB at 09:20

## 2024-12-03 RX ADMIN — CITALOPRAM HYDROBROMIDE 20 MG: 20 TABLET ORAL at 09:21

## 2024-12-03 RX ADMIN — SENNOSIDES AND DOCUSATE SODIUM 1 TABLET: 50; 8.6 TABLET ORAL at 09:21

## 2024-12-03 ASSESSMENT — ACTIVITIES OF DAILY LIVING (ADL)
ADLS_ACUITY_SCORE: 28
ADLS_ACUITY_SCORE: 20
ADLS_ACUITY_SCORE: 20
ADLS_ACUITY_SCORE: 28
ADLS_ACUITY_SCORE: 30
PREVIOUS_RESPONSIBILITIES: MEAL PREP;HOUSEKEEPING;LAUNDRY;SHOPPING;MEDICATION MANAGEMENT;FINANCES;DRIVING
ADLS_ACUITY_SCORE: 30
ADLS_ACUITY_SCORE: 28
ADLS_ACUITY_SCORE: 30
ADLS_ACUITY_SCORE: 28
ADLS_ACUITY_SCORE: 30
ADLS_ACUITY_SCORE: 33
ADLS_ACUITY_SCORE: 20
ADLS_ACUITY_SCORE: 33
ADLS_ACUITY_SCORE: 28
ADLS_ACUITY_SCORE: 28
ADLS_ACUITY_SCORE: 30
ADLS_ACUITY_SCORE: 20
ADLS_ACUITY_SCORE: 28
ADLS_ACUITY_SCORE: 20

## 2024-12-03 NOTE — PROGRESS NOTES
"Orthopedic Progress Note      Assessment: 1 Day Post-Op  S/P Procedure(s):  RIGHT LUMBAR 4-5 TRANSFORAMINAL LUMBAR INTERBODY FUSION @    Plan:   - Continue PT/OT  - Weightbearing status: WBAT can use brace for comfort  - No bending, twisting or lifting more than 10 pounds for 6 weeks.  - Anticoagulation: no chemical prophylaxis in addition to SCDs, madhu stockings and early ambulation.  - Orthosis consult placed for LSO brace  - Can come off of bed rest at 1300 today. Start with raising head of bed from flat to 30 degrees for 30 minutes. Then advance every 30-45 minutes another 15 degrees until sitting up. If tolerates sitting up can trial moving to the chair and ambulation. If at any point in time they develop a headache return to bed and back to flat bed rest for another 24 hours.   - Discharge planning: off bed rest, BM, pain control and progression in therapy       Subjective:  Pain: moderate  Chest pain, SOB: no  Nausea, Vomiting:  no  Lightheadedness, Dizziness:  no  Neuro:  Patient denies new onset numbness or paresthesias    Patient is doing well  today notes that she feels very stiff from laying for so long. Does have a mild sinus headache from laying flat this morning. No other complaints. Right leg pain is improved but still present    Objective:  /57 (BP Location: Right arm, Patient Position: Supine, Cuff Size: Adult Regular)   Pulse 87   Temp 97.4  F (36.3  C) (Oral)   Resp 12   Ht 1.651 m (5' 5\")   Wt 62.6 kg (138 lb)   LMP  (LMP Unknown)   SpO2 98%   BMI 22.96 kg/m    The patient is A&Ox3. Appears comfortable.   Sensation is intact.  Dorsiflexion and plantar flexion is intact.  Dorsalis pedis pulse intact.  Calves are soft and non-tender. Negative Josefa's.  The incision is covered. Dressing C/D/I    Pertinent Labs   Lab Results: personally reviewed.   No results found for: \"INR\", \"PROTIME\"  Lab Results   Component Value Date    HGB 10.8 (L) 12/02/2024     Lab Results   Component Value Date "     11/19/2024    CO2 27 11/19/2024         Report completed by:  Zayra Caballero PA-C/Dr. Winchester  [facility]    Date: 12/3/2024  Time: 8:17 AM

## 2024-12-03 NOTE — PLAN OF CARE
Note from [id number]. Pt rated pain 3-7/10 during shift thus far. No new skin issues noted. Dressing is intact. Full sensation per pt. Pt tolerated HOB at 55 degrees and then started getting a headache. Autumn from [facility] notified that pt will be back to flat bedrest for 24 hours. Fluids running, IV patent. Mejia draining adequately. Education on medication administration and use of call-light to reduce risk for falls and injury. No further issues noted. Alarms in place. VSS with noted, softer BPs, denies shortness of breath. Attempting to wean off O2.    Kaylin Adame RN

## 2024-12-03 NOTE — PLAN OF CARE
Goal Outcome Evaluation:    Note from [id number]. VSS on 1L NC. Denies shortness of breath. A&Ox4. Pt rated pain 8/10, 10mg oxy given, breakthrough pain 7/10, 0.4mg IV dilaudid given. No new skin issues noted. Dressing on back is CDI. Full sensation per pt. Pt on bed rest with HOB flat throughout shift. R PIV running NS at 100mL/hr, L PIV SL.  Voiding adequately via carvalho. Education of medication administration and use of call-light to reduce risk for falls and injury.     Problem: Spinal Surgery  Goal: Optimal Coping with Surgery  Outcome: Progressing     Problem: Adult Inpatient Plan of Care  Goal: Readiness for Transition of Care  Outcome: Progressing

## 2024-12-03 NOTE — PROGRESS NOTES
Patient vital signs are at baseline: Yes  Patient able to ambulate as they were prior to admission or with assist devices provided by therapies during their stay:  No,  Reason:  BEDREST until 1PM 12/3/24  Dural tear protocol  Patient MUST void prior to discharge:  Yes  Due to void  Mejia in place  3 PVRs needed  Patient able to tolerate oral intake:  Yes  Pain has adequate pain control using Oral analgesics:  Yes  Does patient have an identified :  Yes  Has goal D/C date and time been discussed with patient:  Yes

## 2024-12-03 NOTE — PROGRESS NOTES
12/03/24 0832   Appointment Info   Signing Clinician's Name / Credentials (OT) Jenny Klein, OTR/L   Living Environment   People in Home alone   Current Living Arrangements house;other (see comments)  (split entry - will stay on the upper level.)   Living Environment Comments Tub/shower with GB and shower chair. RTS Vanity on the L   Self-Care   Usual Activity Tolerance good   Current Activity Tolerance other (see comments)   Regular Exercise   (Pt on strict bedrest due to dural tear after surgery.)   Instrumental Activities of Daily Living (IADL)   Previous Responsibilities meal prep;housekeeping;laundry;shopping;medication management;finances;driving   General Information   Onset of Illness/Injury or Date of Surgery 12/02/24   Referring Physician Dr. Vidal Winchester   Patient/Family Therapy Goal Statement (OT) To return home or poss TCU.   Additional Occupational Profile Info/Pertinent History of Current Problem Adm for spine surgery -- R L4-5 transforaminal lumbar interbody fusion.  PMH: HTN, Anxiety Disorder, basal cell CA, Ulcer of duodenal, R TKA, R ISABELA, L CT   Existing Precautions/Restrictions (S)  spinal;other (see comments)  (On strict bedrest until 13:00 12/3/24)   Cognitive Status Examination   Follows Commands WNL   Visual Perception   Visual Impairment/Limitations corrective lenses for reading   Bed Mobility   Bed Mobility rolling left;rolling right   Rolling Left [place] (Bed Mobility) minimum assist (75% patient effort);moderate assist (50% patient effort)   Rolling Right [place] (Bed Mobility) minimum assist (75% patient effort);moderate assist (50% patient effort)   Assistive Device (Bed Mobility) bed rails;draw sheet   Clinical Impression   Criteria for Skilled Therapeutic Interventions Met (OT) Yes, treatment indicated   OT Diagnosis decreased indep with ADLs and trsf due to spinal surgery - currently on bedrest.   OT Problem List-Impairments impacting ADL problems related  to;mobility   Assessment of Occupational Performance 3-5 Performance Deficits   Identified Performance Deficits dressing, toileting, G/H, trsfs, bathing   Planned Therapy Interventions (OT) ADL retraining;transfer training   Clinical Decision Making Complexity (OT) detailed assessment/moderate complexity   Risk & Benefits of therapy have been explained evaluation/treatment results reviewed;participants included;patient   OT Total Evaluation Time   OT Eval, Moderate Complexity Minutes (61163) 15   OT Goals   Therapy Frequency (OT) 2 times/day   OT Goals Lower Body Dressing;Bed Mobility;Toilet Transfer/Toileting;Hygiene/Grooming   OT: Hygiene/Grooming modified independent   OT: Lower Body Dressing Modified independent   OT: Bed Mobility Modified independent   OT: Toilet Transfer/Toileting Modified independent   Interventions   Interventions Quick Adds Self-Care/Home Management   Self-Care/Home Management   Self-Care/Home Mgmt/ADL, Compensatory, Meal Prep Minutes (85917) 15   Symptoms Noted During/After Treatment (Meal Preparation/Planning Training) increased pain   Treatment Detail/Skilled Intervention Pt resting in bed in supine with bed flat.  Reviewed with Pt their spine precautions and provided Pt with a written handout to take home.  Educated Pt on proper technique for log rolling in bed in order to stay within their spine precautions and current strict bedrest protocol.  Pt was able to demonstrate their understanding rolling from supine to R side and supine to L side with min to mod A of one.  VC needed for correct technique.  Pt also given handout re: log roll technique and to always be in sidelying to eat or do any G/H cares.  Pt verbalized her understanding. At end of session Pt was resting in supine in bed with light within reach. RN aware.   OT Discharge Planning   OT Plan See this PM when off of bedrest.  Begin trsfs and dressing and G/H work.   OT Discharge Recommendation (DC Rec)   (To be determined once  Pt is off of strict bedrest.)   OT Rationale for DC Rec Pt lives alone.  Pt is considering TCU prior to returning home alone.  Will further assess Pt's ability to care for herself once she is off of strict bedrest.   OT Brief overview of current status Min to Mod A of one with log rolling.  Educated on spine precautions.   Total Session Time   Timed Code Treatment Minutes 15   Total Session Time (sum of timed and untimed services) 30

## 2024-12-03 NOTE — PROGRESS NOTES
Patient vital signs are at baseline: Yes  Patient able to ambulate as they were prior to admission or with assist devices provided by therapies during their stay:  No,  Reason:  Bedrest - flat - Dural tear protcol  Patient MUST void prior to discharge:  Yes  Mejia in place  Due to void  3 PVRs needed  Patient able to tolerate oral intake:  Yes  Pain has adequate pain control using Oral analgesics:  Yes  Does patient have an identified :  Yes  Has goal D/C date and time been discussed with patient:  Yes    Nausea this shift, PRN zofran given

## 2024-12-03 NOTE — PROGRESS NOTES
[facility] MEDICINE PROGRESS NOTE      Summary: 79 year old female into [facility] on 12/2/2024 after arriving to the hospital for elective lumbar surgery.  Pt underwent decompression though  Had a small dural tear.  Post op she was put on flat bedrest.      12/4/2024:  stable ovenright; tolerating a full liquid diet; soft blood pressures   Will add small bump of NS      Problem list:     POD #0 L4-5 decompression with dural tear: pt on bedrest until  1300 today when activity will gradually increase per ortho direction    2.  Mood disorder: continue celexa  3.  Anemia, acute post hemorrhage: due to intraoperative blood loss;     Stable at 10.8  3.  Dvt prevention:  scd  4.  Disposition:  Medically Ready for Discharge: Anticipated in 2-4 Days       Sarita Whitfield MD  [facility] Medicine  [facility]  Phone: #[phone]  Securely message with the Vocera Web Console (learn more here)  Text page via [employer] Paging/Directory     Interval History/Subjective:  tolerating her post op course; attempt  To be upright today caused mild headache      Physical Exam/Objective:  Temp:  [96.8  F (36  C)-98.9  F (37.2  C)] 97.1  F (36.2  C)  Pulse:  [74-99] 89  Resp:  [11-20] 16  BP: ()/(48-74) 93/58  SpO2:  [92 %-100 %] 95 %  Body mass index is 22.96 kg/m .    GENERAL:  Awake alert, cooperative;    HEAD:  Normocephalic, without obvious abnormality, atraumatic   EYES:  PERRL, conjunctiva/corneas clear, no scleral icterus, EOM's intact   NOSE: Nares normal, septum midline, mucosa normal, no drainage   THROAT: Lips, mucosa, and tongue normal; teeth and gums normal, mouth moist   NECK: Supple, symmetrical, trachea midline   BACK:   Symmetric, no curvature, ROM normal   LUNGS:   Clear to auscultation bilaterally, no rales, rhonchi, or wheezing, symmetric chest rise on inhalation, respirations unlabored   CHEST WALL:  No tenderness or deformity   HEART:  Regular rate  Verified Results  (Q) HEPATITIS C ANTIBODY [id number] 10:16AM [facility]     Test Name Result Flag Reference   HEPATITIS C ANTIBODY NON-REACTIVE  NON-REACTIVE   SIGNAL TO CUT-OFF 0 01  <1 00 and rhythm, S1 and S2 normal, no murmur, rub, or gallop    ABDOMEN:   Soft, non-tender, bowel sounds active all four quadrants, no masses, no organomegaly, no rebound or guarding; carvalho in place   EXTREMITIES: Extremities normal, atraumatic, no cyanosis or edema    SKIN: Dry to touch, no exanthems in the visualized areas   NEURO: Alert, oriented x3, moves all four extremities freely   PSYCH: Cooperative, behavior is appropriate      Data reviewed today: I personally reviewed all new medications, labs, imaging/diagnostics reports over the past 24 hours. Pertinent findings include:    Imaging:   No results found for this or any previous visit (from the past 24 hours).    Labs:      Medications:   Personally Reviewed.  Medications   Current Facility-Administered Medications   Medication Dose Route Frequency Provider Last Rate Last Admin    sodium chloride 0.9 % infusion   Intravenous Continuous Sarita Whitfield  mL/hr at 12/03/24 1240 New Bag at 12/03/24 1240     Current Facility-Administered Medications   Medication Dose Route Frequency Provider Last Rate Last Admin    acetaminophen (TYLENOL) tablet 975 mg  975 mg Oral Q8H Mykel Holder PA-C   975 mg at 12/03/24 0921    citalopram (celeXA) tablet 20 mg  20 mg Oral Daily Sarita Whitfield MD   20 mg at 12/03/24 0921    gabapentin (NEURONTIN) capsule 100 mg  100 mg Oral At Bedtime Mykel Holder PA-C   100 mg at 12/02/24 2032    multivitamin, therapeutic (THERA-VIT) tablet 1 tablet  1 tablet Oral Daily Mykel Holder PA-C   1 tablet at 12/03/24 0920    pantoprazole (PROTONIX) EC tablet 40 mg  40 mg Oral QAM Sarita Whitfield MD   40 mg at 12/03/24 0920    polyethylene glycol (MIRALAX) Packet 17 g  17 g Oral Daily Mykel Holder PA-C   17 g at 12/03/24 0920    senna-docusate (SENOKOT-S/PERICOLACE) 8.6-50 MG per tablet 1 tablet  1 tablet Oral BID Mykel Holder PA-C   1 tablet at 12/03/24 0921

## 2024-12-04 ENCOUNTER — APPOINTMENT (OUTPATIENT)
Dept: PHYSICAL THERAPY | Facility: CLINIC | Age: 79
DRG: 402 | End: 2024-12-04
Attending: PHYSICIAN ASSISTANT
Payer: COMMERCIAL

## 2024-12-04 ENCOUNTER — APPOINTMENT (OUTPATIENT)
Dept: RADIOLOGY | Facility: CLINIC | Age: 79
DRG: 402 | End: 2024-12-04
Payer: COMMERCIAL

## 2024-12-04 PROCEDURE — 250N000011 HC RX IP 250 OP 636: Performed by: EMERGENCY MEDICINE

## 2024-12-04 PROCEDURE — 250N000013 HC RX MED GY IP 250 OP 250 PS 637: Performed by: PHYSICIAN ASSISTANT

## 2024-12-04 PROCEDURE — 97530 THERAPEUTIC ACTIVITIES: CPT | Mod: GP

## 2024-12-04 PROCEDURE — 250N000013 HC RX MED GY IP 250 OP 250 PS 637: Performed by: EMERGENCY MEDICINE

## 2024-12-04 PROCEDURE — 250N000011 HC RX IP 250 OP 636: Performed by: PHYSICIAN ASSISTANT

## 2024-12-04 PROCEDURE — 99233 SBSQ HOSP IP/OBS HIGH 50: CPT | Performed by: EMERGENCY MEDICINE

## 2024-12-04 PROCEDURE — 258N000003 HC RX IP 258 OP 636: Performed by: EMERGENCY MEDICINE

## 2024-12-04 PROCEDURE — 71045 X-RAY EXAM CHEST 1 VIEW: CPT

## 2024-12-04 PROCEDURE — 120N000001 HC R&B MED SURG/OB

## 2024-12-04 PROCEDURE — 97163 PT EVAL HIGH COMPLEX 45 MIN: CPT | Mod: GP

## 2024-12-04 RX ORDER — FUROSEMIDE 10 MG/ML
10 INJECTION INTRAMUSCULAR; INTRAVENOUS ONCE
Status: COMPLETED | OUTPATIENT
Start: 2024-12-04 | End: 2024-12-04

## 2024-12-04 RX ADMIN — OXYCODONE 5 MG: 5 TABLET ORAL at 22:39

## 2024-12-04 RX ADMIN — ACETAMINOPHEN 975 MG: 325 TABLET ORAL at 03:45

## 2024-12-04 RX ADMIN — ACETAMINOPHEN 975 MG: 325 TABLET ORAL at 09:35

## 2024-12-04 RX ADMIN — POLYETHYLENE GLYCOL 3350 17 G: 17 POWDER, FOR SOLUTION ORAL at 09:36

## 2024-12-04 RX ADMIN — OXYCODONE 5 MG: 5 TABLET ORAL at 00:22

## 2024-12-04 RX ADMIN — PROCHLORPERAZINE EDISYLATE 5 MG: 5 INJECTION INTRAMUSCULAR; INTRAVENOUS at 16:59

## 2024-12-04 RX ADMIN — SENNOSIDES AND DOCUSATE SODIUM 1 TABLET: 50; 8.6 TABLET ORAL at 09:36

## 2024-12-04 RX ADMIN — FUROSEMIDE 10 MG: 10 INJECTION, SOLUTION INTRAMUSCULAR; INTRAVENOUS at 14:58

## 2024-12-04 RX ADMIN — SODIUM CHLORIDE: 9 INJECTION, SOLUTION INTRAVENOUS at 02:47

## 2024-12-04 RX ADMIN — PANTOPRAZOLE SODIUM 40 MG: 40 TABLET, DELAYED RELEASE ORAL at 06:58

## 2024-12-04 RX ADMIN — ACETAMINOPHEN 975 MG: 325 TABLET ORAL at 16:58

## 2024-12-04 RX ADMIN — ONDANSETRON 4 MG: 2 INJECTION INTRAMUSCULAR; INTRAVENOUS at 00:15

## 2024-12-04 RX ADMIN — ONDANSETRON 4 MG: 4 TABLET, ORALLY DISINTEGRATING ORAL at 13:56

## 2024-12-04 RX ADMIN — CITALOPRAM HYDROBROMIDE 20 MG: 20 TABLET ORAL at 09:36

## 2024-12-04 RX ADMIN — THERA TABS 1 TABLET: TAB at 09:36

## 2024-12-04 RX ADMIN — OXYCODONE 5 MG: 5 TABLET ORAL at 09:36

## 2024-12-04 RX ADMIN — GABAPENTIN 100 MG: 100 CAPSULE ORAL at 22:39

## 2024-12-04 RX ADMIN — HYDROXYZINE HYDROCHLORIDE 10 MG: 10 TABLET, FILM COATED ORAL at 22:39

## 2024-12-04 ASSESSMENT — ACTIVITIES OF DAILY LIVING (ADL)
ADLS_ACUITY_SCORE: 35
ADLS_ACUITY_SCORE: 40
ADLS_ACUITY_SCORE: 40
ADLS_ACUITY_SCORE: 35
ADLS_ACUITY_SCORE: 35
ADLS_ACUITY_SCORE: 40
ADLS_ACUITY_SCORE: 35
ADLS_ACUITY_SCORE: 40
ADLS_ACUITY_SCORE: 35

## 2024-12-04 NOTE — PROGRESS NOTES
"   12/04/24 1410   Appointment Info   Signing Clinician's Name / Credentials (PT) Jennifer Gr, PT, DPT   Living Environment   People in Home alone   Current Living Arrangements house;other (see comments)   Home Accessibility stairs to enter home;stairs within home   Number of Stairs, Main Entrance 2   Stair Railings, Main Entrance none   Number of Stairs, Within Home, Primary seven   Stair Railings, Within Home, Primary railing on right side (ascending)   Living Environment Comments pt reports does not have anyone who can stay with her at d/c.   Self-Care   Usual Activity Tolerance good   Current Activity Tolerance poor   Equipment Currently Used at Home none   Fall history within last six months no   Activity/Exercise/Self-Care Comment IND at baseline, has a FWW at home available   General Information   Onset of Illness/Injury or Date of Surgery 12/02/24   Referring Physician Mykel Holder, PA-C   Patient/Family Therapy Goals Statement (PT) hoping to d/c to TCU   Pertinent History of Current Problem (include personal factors and/or comorbidities that impact the POC) 80 y/o F POD #2 RIGHT LUMBAR 4-5 TRANSFORAMINAL LUMBAR INTERBODY FUSION. Pt with small dural tear. Now off bedrest orders. Per orthopedics note 12/04/24: \"No bending, twisting or lifting more than 10 pounds for 6 weeks\" Clarified with Zayra-no brace. See chart for further details.   Existing Precautions/Restrictions fall;spinal   General Observations sidelying in the bed, NAD   Cognition   Affect/Mental Status (Cognition) WFL   Orientation Status (Cognition) oriented to;person;place;situation   Pain Assessment   Patient Currently in Pain Yes, see Vital Sign flowsheet  (back and anterior left thigh)   Integumentary/Edema   Integumentary/Edema Comments incision intact low back   Range of Motion (ROM)   ROM Comment limited trunk ROM-spinal precautions   Strength (Manual Muscle Testing)   Strength Comments able to lift LEs against gravity-no knee " buckling with standing, generalized strengthening   Bed Mobility   Comment, (Bed Mobility) min A x 2 with supine to sit   Transfers   Comment, (Transfers) min A x 2 with sit to stand with FWW   Gait/Stairs (Locomotion)   Comment, (Gait/Stairs) NT-unable to tolerate   Balance   Balance Comments min A with standing balance with FWW   Sensory Examination   Sensory Perception patient reports no sensory changes   Clinical Impression   Criteria for Skilled Therapeutic Intervention Yes, treatment indicated   PT Diagnosis (PT) impaired gait   Influenced by the following impairments impaired activity tolerance, impaired balance, generalized weakness   Functional limitations due to impairments impaired IND with functional mobility   Clinical Presentation (PT Evaluation Complexity) unstable   Clinical Presentation Rationale clinical judgement, medical status   Clinical Decision Making (Complexity) high complexity   Planned Therapy Interventions (PT) balance training;bed mobility training;gait training;home exercise program;patient/family education;stair training;strengthening;home program guidelines;risk factor education;progressive activity/exercise;transfer training   Risk & Benefits of therapy have been explained evaluation/treatment results reviewed;care plan/treatment goals reviewed;risks/benefits reviewed;current/potential barriers reviewed;participants voiced agreement with care plan;participants included;patient   PT Total Evaluation Time   PT Eval, High Complexity Minutes (06124) 15   Physical Therapy Goals   PT Frequency Daily   PT Predicted Duration/Target Date for Goal Attainment 12/13/24   PT Goals Bed Mobility;Transfers;Gait;Stairs   PT: Transfers Modified independent;Sit to/from stand;Bed to/from chair;Assistive device   PT: Gait Modified independent;Greater than 200 feet;Rolling walker   PT: Stairs Supervision/stand-by assist;4 stairs;Rail on right   Interventions   Interventions Quick Adds Therapeutic Activity    Therapeutic Activity   Therapeutic Activities: dynamic activities to improve functional performance Minutes (44890) 15   Symptoms Noted During/After Treatment Fatigue;Increased pain;Dizziness  (nausea)   Treatment Detail/Skilled Intervention reviewed spinal precautions with pt, log rolling with min A supine to sit with cues. mod A x 2 with sit to supine at the end of session. increased time sitting at EOB to acclimate. sit to stand with min A x 1-2 with FWW, able to march x 5 reps B LEs and take side steps to the HOB. c/o HA/pressure on forehead when OOB, improved once returned to bed-RN present during session and updated ortho PAMarty, elevated HR: 100-110 at rest and during activity, BP stable, reporting some nausea once returned to bed.   PT Discharge Planning   PT Plan review spinal precautions, progress OOB mobility, amb with FWW as able (dural tear-monitor for any HA symptoms)   PT Discharge Recommendation (DC Rec) Transitional Care Facility   PT Rationale for DC Rec Pt lives alone, reports does not have anyone who can stay with her at d/c-son who was present in the room confirms this. Currently limited tolerance with activity-requires assist. Pt hoping for TCU at d/c.   PT Brief overview of current status A x 2 with FWW, limited tolerance during session, able to stand, marching in place and take a few side steps-c/o HA/pressure on forehead when OOB, improved once returned to bed-RN present during session and updated ortho PAMarty, elevated HR: 100-110 at rest and during activity, BP stable, some nausea   Physical Therapy Time and Intention   Timed Code Treatment Minutes 15   Total Session Time (sum of timed and untimed services) 30

## 2024-12-04 NOTE — PLAN OF CARE
Problem: Spinal Surgery  Goal: Nausea and Vomiting Relief  Intervention: Prevent or Manage Nausea and Vomiting  Problem: Pain Acute  Goal: Optimal Pain Control and Function  Intervention: Develop Pain Management Plan   Problem: Spinal Surgery  Goal: Absence of Bleeding  Intervention: Monitor and Manage Bleeding   Problem: Adult Inpatient Plan of Care  Goal: Absence of Hospital-Acquired Illness or Injury  Intervention: Identify and Manage Fall Risk   Goal Outcome Evaluation:  Patient vital signs are at baseline: Yes.Oxygen via NC at night  Patient able to ambulate as they were prior to admission or with assist devices provided by therapies during their stay:  No,  Reason:  Bedrest  Patient MUST void prior to discharge:  No,  Reason:  Mjeia catheter in place.  Patient able to tolerate oral intake:  Yes  Pain has adequate pain control using Oral analgesics:  Yes. Scheduled Tylenol and PRN Oxycodone administered for pain control. Utilized ice packs for comfort.   Does patient have an identified :  Yes  Has goal D/C date and time been discussed with patient:  Yes

## 2024-12-04 NOTE — PROGRESS NOTES
"Orthopedic Progress Note      Assessment: 2 Day Post-Op  S/P Procedure(s):  RIGHT LUMBAR 4-5 TRANSFORAMINAL LUMBAR INTERBODY FUSION @    Plan:   - Continue PT/OT  - Weightbearing status: WBAT can use brace for comfort  - No bending, twisting or lifting more than 10 pounds for 6 weeks.  - Anticoagulation: no chemical prophylaxis in addition to SCDs, madhu stockings and early ambulation.  - Can come off of bed rest at 1000 today. Start with raising head of bed from flat to 30 degrees for 30 minutes. Then advance every 30-45 minutes another 15 degrees until sitting up. If tolerates sitting up can trial moving to the chair and ambulation. If at any point in time they develop a headache return to bed and back to flat bed rest for another 24 hours.   - Discharge planning: off bed rest, BM, pain control and progression in therapy       Subjective:  Pain: moderate  Chest pain, SOB: no  Nausea, Vomiting:  no  Lightheadedness, Dizziness:  no  Neuro:  Patient denies new onset numbness or paresthesias    Patient is doing well  today notes that she feels very stiff from laying for so long. Does have a mild sinus headache from laying flat this morning. No other complaints. Right leg pain is improved but still present    Objective:  /56 (BP Location: Right arm)   Pulse 109   Temp 97.9  F (36.6  C) (Oral)   Resp 16   Ht 1.651 m (5' 5\")   Wt 62.6 kg (138 lb)   LMP  (LMP Unknown)   SpO2 (!) 88%   BMI 22.96 kg/m    The patient is A&Ox3. Appears comfortable.   Sensation is intact.  Dorsiflexion and plantar flexion is intact.  Dorsalis pedis pulse intact.  Calves are soft and non-tender. Negative Josefa's.  The incision is covered. Dressing C/D/I    Pertinent Labs   Lab Results: personally reviewed.   No results found for: \"INR\", \"PROTIME\"  Lab Results   Component Value Date    HGB 10.8 (L) 12/02/2024     Lab Results   Component Value Date     11/19/2024    CO2 27 11/19/2024         Report completed by:  Zayra TRIMBLE" BRENTON Caballero/Dr. Winchester  [facility]  12/04/24

## 2024-12-05 ENCOUNTER — APPOINTMENT (OUTPATIENT)
Dept: RADIOLOGY | Facility: CLINIC | Age: 79
DRG: 402 | End: 2024-12-05
Attending: EMERGENCY MEDICINE
Payer: COMMERCIAL

## 2024-12-05 ENCOUNTER — APPOINTMENT (OUTPATIENT)
Dept: PHYSICAL THERAPY | Facility: CLINIC | Age: 79
DRG: 402 | End: 2024-12-05
Attending: ORTHOPAEDIC SURGERY
Payer: COMMERCIAL

## 2024-12-05 ENCOUNTER — APPOINTMENT (OUTPATIENT)
Dept: CARDIOLOGY | Facility: CLINIC | Age: 79
DRG: 402 | End: 2024-12-05
Attending: FAMILY MEDICINE
Payer: COMMERCIAL

## 2024-12-05 ENCOUNTER — APPOINTMENT (OUTPATIENT)
Dept: OCCUPATIONAL THERAPY | Facility: CLINIC | Age: 79
DRG: 402 | End: 2024-12-05
Attending: ORTHOPAEDIC SURGERY
Payer: COMMERCIAL

## 2024-12-05 ENCOUNTER — APPOINTMENT (OUTPATIENT)
Dept: CT IMAGING | Facility: CLINIC | Age: 79
DRG: 402 | End: 2024-12-05
Attending: FAMILY MEDICINE
Payer: COMMERCIAL

## 2024-12-05 VITALS
HEART RATE: 115 BPM | DIASTOLIC BLOOD PRESSURE: 62 MMHG | SYSTOLIC BLOOD PRESSURE: 110 MMHG | HEIGHT: 65 IN | OXYGEN SATURATION: 93 % | WEIGHT: 138 LBS | RESPIRATION RATE: 18 BRPM | TEMPERATURE: 98.2 F | BODY MASS INDEX: 22.99 KG/M2

## 2024-12-05 LAB
CREAT SERPL-MCNC: 0.7 MG/DL (ref 0.51–0.95)
EGFRCR SERPLBLD CKD-EPI 2021: 87 ML/MIN/1.73M2
LVEF ECHO: NORMAL
NT-PROBNP SERPL-MCNC: 4980 PG/ML (ref 0–1800)

## 2024-12-05 PROCEDURE — 250N000011 HC RX IP 250 OP 636: Performed by: FAMILY MEDICINE

## 2024-12-05 PROCEDURE — 36415 COLL VENOUS BLD VENIPUNCTURE: CPT | Performed by: ORTHOPAEDIC SURGERY

## 2024-12-05 PROCEDURE — 255N000002 HC RX 255 OP 636: Performed by: FAMILY MEDICINE

## 2024-12-05 PROCEDURE — 36415 COLL VENOUS BLD VENIPUNCTURE: CPT | Performed by: FAMILY MEDICINE

## 2024-12-05 PROCEDURE — 250N000013 HC RX MED GY IP 250 OP 250 PS 637: Performed by: PHYSICIAN ASSISTANT

## 2024-12-05 PROCEDURE — 82565 ASSAY OF CREATININE: CPT | Performed by: ORTHOPAEDIC SURGERY

## 2024-12-05 PROCEDURE — 97530 THERAPEUTIC ACTIVITIES: CPT | Mod: GP

## 2024-12-05 PROCEDURE — C8929 TTE W OR WO FOL WCON,DOPPLER: HCPCS

## 2024-12-05 PROCEDURE — 250N000013 HC RX MED GY IP 250 OP 250 PS 637: Performed by: FAMILY MEDICINE

## 2024-12-05 PROCEDURE — 71045 X-RAY EXAM CHEST 1 VIEW: CPT

## 2024-12-05 PROCEDURE — 99233 SBSQ HOSP IP/OBS HIGH 50: CPT | Performed by: FAMILY MEDICINE

## 2024-12-05 PROCEDURE — 83880 ASSAY OF NATRIURETIC PEPTIDE: CPT | Performed by: FAMILY MEDICINE

## 2024-12-05 PROCEDURE — 93306 TTE W/DOPPLER COMPLETE: CPT | Mod: 26 | Performed by: INTERNAL MEDICINE

## 2024-12-05 PROCEDURE — 250N000013 HC RX MED GY IP 250 OP 250 PS 637: Performed by: EMERGENCY MEDICINE

## 2024-12-05 PROCEDURE — 71275 CT ANGIOGRAPHY CHEST: CPT

## 2024-12-05 PROCEDURE — 97535 SELF CARE MNGMENT TRAINING: CPT | Mod: GO

## 2024-12-05 PROCEDURE — 120N000001 HC R&B MED SURG/OB

## 2024-12-05 RX ORDER — FUROSEMIDE 10 MG/ML
20 INJECTION INTRAMUSCULAR; INTRAVENOUS ONCE
Status: COMPLETED | OUTPATIENT
Start: 2024-12-05 | End: 2024-12-05

## 2024-12-05 RX ORDER — IOPAMIDOL 755 MG/ML
100 INJECTION, SOLUTION INTRAVASCULAR ONCE
Status: COMPLETED | OUTPATIENT
Start: 2024-12-05 | End: 2024-12-05

## 2024-12-05 RX ORDER — FUROSEMIDE 10 MG/ML
20 INJECTION INTRAMUSCULAR; INTRAVENOUS EVERY 12 HOURS
Status: DISCONTINUED | OUTPATIENT
Start: 2024-12-05 | End: 2024-12-06

## 2024-12-05 RX ADMIN — PANTOPRAZOLE SODIUM 40 MG: 40 TABLET, DELAYED RELEASE ORAL at 06:41

## 2024-12-05 RX ADMIN — OXYCODONE 5 MG: 5 TABLET ORAL at 12:43

## 2024-12-05 RX ADMIN — PERFLUTREN 3 ML: 6.52 INJECTION, SUSPENSION INTRAVENOUS at 13:40

## 2024-12-05 RX ADMIN — FUROSEMIDE 20 MG: 10 INJECTION, SOLUTION INTRAMUSCULAR; INTRAVENOUS at 09:13

## 2024-12-05 RX ADMIN — ACETAMINOPHEN 975 MG: 325 TABLET ORAL at 06:41

## 2024-12-05 RX ADMIN — GABAPENTIN 100 MG: 100 CAPSULE ORAL at 21:04

## 2024-12-05 RX ADMIN — METOPROLOL SUCCINATE 12.5 MG: 25 TABLET, EXTENDED RELEASE ORAL at 15:17

## 2024-12-05 RX ADMIN — FUROSEMIDE 20 MG: 10 INJECTION, SOLUTION INTRAMUSCULAR; INTRAVENOUS at 17:25

## 2024-12-05 RX ADMIN — THERA TABS 1 TABLET: TAB at 09:13

## 2024-12-05 RX ADMIN — OXYCODONE 5 MG: 5 TABLET ORAL at 06:41

## 2024-12-05 RX ADMIN — IOPAMIDOL 90 ML: 755 INJECTION, SOLUTION INTRAVENOUS at 10:39

## 2024-12-05 RX ADMIN — CITALOPRAM HYDROBROMIDE 20 MG: 20 TABLET ORAL at 09:13

## 2024-12-05 ASSESSMENT — ACTIVITIES OF DAILY LIVING (ADL)
ADLS_ACUITY_SCORE: 37
ADLS_ACUITY_SCORE: 37
ADLS_ACUITY_SCORE: 35
ADLS_ACUITY_SCORE: 37
ADLS_ACUITY_SCORE: 39
ADLS_ACUITY_SCORE: 37
ADLS_ACUITY_SCORE: 39
ADLS_ACUITY_SCORE: 38
ADLS_ACUITY_SCORE: 35
ADLS_ACUITY_SCORE: 37
ADLS_ACUITY_SCORE: 39
ADLS_ACUITY_SCORE: 38
ADLS_ACUITY_SCORE: 35
ADLS_ACUITY_SCORE: 35
ADLS_ACUITY_SCORE: 39
ADLS_ACUITY_SCORE: 35
ADLS_ACUITY_SCORE: 35
ADLS_ACUITY_SCORE: 37
ADLS_ACUITY_SCORE: 39
ADLS_ACUITY_SCORE: 35
ADLS_ACUITY_SCORE: 39
ADLS_ACUITY_SCORE: 35
ADLS_ACUITY_SCORE: 35

## 2024-12-05 NOTE — PROGRESS NOTES
[facility] MEDICINE PROGRESS NOTE      Identification/Summary: Pam Chino is a 79 year old female with a past medical history of GERD, osteopenia, depression, underwent L4-5 decompression with small dural tear.  Patient was on 48 hours bedrest.  Was tachycardic with heart rate 120s and requiring 1-2 L of oxygen.  Chest x-ray revealed small right pleural effusion.  Chest CT scan no PE.  Small bilateral pleural effusion right more than left.  BNP 5000.  Started on gentle diuresis.  Echocardiogram will be obtained.  Metoprolol 12.5 mg once for tachycardia.  Patient is asymptomatic.    Assessment and Plan:  Sinus tachycardia, heart rate 120s  Metoprolol XL 12.5 mg once    Acute hypoxic respiratory failure  Was requiring 2 L of oxygen, now able to wean off    CHF exacerbation  Echocardiogram  IV Lasix 20 mg bid    Mood disorder  Has stable mood and affect  Celexa 20 mg daily    GERD  Resume PPI    Code full    Status post L4-5 decompression  Status post bedrest for dural tear  Resume routine postoperative care  Physical and Occupational Therapy  Use incentive spirometry frequently  DVT prophylaxis per orthopedics with early ambulation and SCDs  Pain control with Tylenol and 75 mg every 8 hours, 650 mg every 4 hours as needed, oxycodone 5 to 10 mg every 4 hours as needed    Medically Ready for Discharge: Anticipated Tomorrow      Clinically Significant Risk Factors                   # Hypertension: Noted on problem list                       Diet: Discharge Instruction - Regular Diet Adult  Regular Diet Adult  Mejia Catheter: PRESENT, indication: ?  (Error. Value could not be saved.), Surgical procedure  Lines: None           Johanna Segovia MD  [facility] Medicine  [facility]  Phone: #[phone]  Securely message with the Vocera Web Console (learn more here)  Text page via [employer] Paging/Directory     Interval History/Subjective:  Resting comfortably.   Breathing is much improved.  Still has tachycardia.  Stable.  No new radiculopathy.  Afebrile.  Spoke with her daughter and updated.    Physical Exam/Objective:  Temp:  [98.1  F (36.7  C)-98.5  F (36.9  C)] 98.5  F (36.9  C)  Pulse:  [] 119  Resp:  [16-18] 18  BP: (103-128)/(53-60) 115/56  SpO2:  [85 %-94 %] 94 %  Body mass index is 22.96 kg/m .    GENERAL:  Alert, appears comfortable, in no acute distress, appears stated age   HEAD:  Normocephalic, without obvious abnormality, atraumatic   EYES:  PERRL, conjunctiva clear, EOM's intact   NOSE: Nares normal, mucosa normal, no drainage   THROAT: Lips, mucosa, gums normal, mouth moist   NECK: Supple, symmetrical, trachea midline   BACK:   S/P back surgery   LUNGS:   Bibasilar crackles, no rales, rhonchi, or wheezing, symmetric chest rise on inhalation, respirations unlabored   CHEST WALL:  No tenderness or deformity   HEART:  Regular rate and rhythm, S1 and S2 normal, no murmur, rub, or gallop    ABDOMEN:   Soft, non-tender, bowel sounds active, no masses, no organomegaly, no rebound or guarding   EXTREMITIES: 1+ BLE  edema    SKIN: Dry to touch in both legs   NEURO: Alert, oriented x3, moves all four extremities freely   PSYCH: Cooperative, behavior is appropriate      Data reviewed today: I personally reviewed all new medications, labs, imaging/diagnostics reports over the past 24 hours. Pertinent findings include:    Imaging:   Chest ct  1.  No acute pulmonary embolism.  2.  Small free-flowing bilateral pleural effusions, right larger than left.    Labs:  Most Recent 3 CBC's:  Recent Labs   Lab Test 12/02/24  1703 12/02/24  1336   HGB 10.8* 10.6*     Most Recent 3 BMP's:  Recent Labs   Lab Test 12/02/24  0832 11/19/24  1152 06/27/24  0929 06/06/24  1357   NA  --  141 142 139   POTASSIUM  --  4.7 4.8 3.9   CHLORIDE  --  103 105 101   CO2  --  27 25 26   BUN  --  16.2 15.9 19.5   CR  --  0.82 0.95 0.90   ANIONGAP  --  11 12 12   PIPER  --  10.3 9.6 9.9   *  96 101* 99     Most Recent 3 BNP's:  Recent Labs   Lab Test 12/05/24  0906   NTBNPI 4,980*       Medications:   Personally Reviewed.  Medications   Current Facility-Administered Medications   Medication Dose Route Frequency Provider Last Rate Last Admin     Current Facility-Administered Medications   Medication Dose Route Frequency Provider Last Rate Last Admin    citalopram (celeXA) tablet 20 mg  20 mg Oral Daily Sarita Whitfield MD   20 mg at 12/05/24 0913    gabapentin (NEURONTIN) capsule 100 mg  100 mg Oral At Bedtime Mykel Holder PA-C   100 mg at 12/04/24 2239    multivitamin, therapeutic (THERA-VIT) tablet 1 tablet  1 tablet Oral Daily Mykel Holder PA-C   1 tablet at 12/05/24 0913    pantoprazole (PROTONIX) EC tablet 40 mg  40 mg Oral QAM Sarita Whitfield MD   40 mg at 12/05/24 0641    polyethylene glycol (MIRALAX) Packet 17 g  17 g Oral Daily Mykel Holder PA-C   17 g at 12/04/24 0936    senna-docusate (SENOKOT-S/PERICOLACE) 8.6-50 MG per tablet 1 tablet  1 tablet Oral BID Mykel Holder PA-C   1 tablet at 12/04/24 0936      Total time spent 50 min

## 2024-12-05 NOTE — PROGRESS NOTES
"Orthopedic Progress Note      Assessment: 3 Day Post-Op  S/P Procedure(s):  RIGHT LUMBAR 4-5 TRANSFORAMINAL LUMBAR INTERBODY FUSION @    Plan:   - Continue PT/OT  - Weightbearing status: WBAT can use brace for comfort  - No bending, twisting or lifting more than 10 pounds for 6 weeks.  - Anticoagulation: no chemical prophylaxis in addition to SCDs, madhu stockings and early ambulation.  - Off bed rest since 12/4  - Discharge planning: medical clearance and progression in therapy       Subjective:  Pain: moderate  Chest pain, SOB: no  Nausea, Vomiting:  no  Lightheadedness, Dizziness:  no  Neuro:  Patient denies new onset numbness or paresthesias    Patient is doing well pain well managed. No other complaints. Right leg pain is improved but still present    Objective:  /56 (BP Location: Left arm)   Pulse 119   Temp 98.5  F (36.9  C) (Oral)   Resp 18   Ht 1.651 m (5' 5\")   Wt 62.6 kg (138 lb)   LMP  (LMP Unknown)   SpO2 94%   BMI 22.96 kg/m    The patient is A&Ox3. Appears comfortable.   Sensation is intact.  Dorsiflexion and plantar flexion is intact.  Dorsalis pedis pulse intact.  Calves are soft and non-tender. Negative Josefa's.  The incision is covered. Dressing C/D/I    Pertinent Labs   Lab Results: personally reviewed.   No results found for: \"INR\", \"PROTIME\"  Lab Results   Component Value Date    HGB 10.8 (L) 12/02/2024     Lab Results   Component Value Date     11/19/2024    CO2 27 11/19/2024         Report completed by:  Zayra Caballero PA-C/Dr. Winchester  [facility]  12/05/24     "

## 2024-12-05 NOTE — PLAN OF CARE
Patient vital signs are at baseline: No,  Reason:  Patient has been on room air for most of the day. She also remains slightly tachycardic today. A small dose of metoprolol was given.   Patient able to ambulate as they were prior to admission or with assist devices provided by therapies during their stay:  No,  Reason:  Patient has improved greatly today. She is now ambulating short distances, she started with having some dizziness when working with PT, but that has resolved.   Patient MUST void prior to discharge:  No,  Reason:  Patient voided x1 but there was no hat to measure. She still needs 2 PVRs   Patient able to tolerate oral intake:  Yes  Pain has adequate pain control using Oral analgesics:  Yes  Does patient have an identified :  Yes  Has goal D/C date and time been discussed with patient:  Yes Plan will be for patient to go to TCU and 2 referrals have been made.

## 2024-12-05 NOTE — CONSULTS
Care Management Initial Consult    General Information  Assessment completed with: Patient, Children,    Type of CM/SW Visit: Initial Assessment    Primary Care Provider verified and updated as needed:     Readmission within the last 30 days:        Reason for Consult: discharge planning  Advance Care Planning: Advance Care Planning Reviewed: no concerns identified          Communication Assessment  Patient's communication style: spoken language (English or Bilingual)    Hearing Difficulty or Deaf: no   Wear Glasses or Blind: no    Cognitive  Cognitive/Neuro/Behavioral: WDL  Level of Consciousness: alert  Arousal Level: opens eyes spontaneously, arouses to touch/gentle shaking  Orientation: oriented x 4             Living Environment:   People in home: alone     Current living Arrangements: house      Able to return to prior arrangements:         Family/Social Support:  Care provided by: self  Provides care for:    Marital Status:   Support system:            Description of Support System:           Current Resources:   Patient receiving home care services:          Community Resources:    Equipment currently used at home: none  Supplies currently used at home:      Employment/Financial:  Employment Status: retired        Financial Concerns:             Does the patient's insurance plan have a 3 day qualifying hospital stay waiver?  Yes     Which insurance plan 3 day waiver is available? Alternative insurance waiver    Will the waiver be used for post-acute placement? Undetermined at this time    Lifestyle & Psychosocial Needs:  Social Drivers of Health     Food Insecurity: Low Risk  (12/2/2024)    Food Insecurity     Within the past 12 months, did you worry that your food would run out before you got money to buy more?: No     Within the past 12 months, did the food you bought just not last and you didn t have money to get more?: No   Depression: Not at risk (12/5/2023)    PHQ-2     PHQ-2 Score: 2   Housing  Stability: High Risk (12/2/2024)    Housing Stability     Do you have housing? : No     Are you worried about losing your housing?: No   Tobacco Use: Medium Risk (12/2/2024)    Patient History     Smoking Tobacco Use: Former     Smokeless Tobacco Use: Never     Passive Exposure: Past   Financial Resource Strain: Low Risk  (12/2/2024)    Financial Resource Strain     Within the past 12 months, have you or your family members you live with been unable to get utilities (heat, electricity) when it was really needed?: No   Alcohol Use: Not on file   Transportation Needs: Low Risk  (12/2/2024)    Transportation Needs     Within the past 12 months, has lack of transportation kept you from medical appointments, getting your medicines, non-medical meetings or appointments, work, or from getting things that you need?: No   Physical Activity: Not on file   Interpersonal Safety: Low Risk  (12/2/2024)    Interpersonal Safety     Do you feel physically and emotionally safe where you currently live?: Yes     Within the past 12 months, have you been hit, slapped, kicked or otherwise physically hurt by someone?: No     Within the past 12 months, have you been humiliated or emotionally abused in other ways by your partner or ex-partner?: No   Stress: Not on file   Social Connections: Not on file   Health Literacy: Not on file       Functional Status:  Prior to admission patient needed assistance:              Mental Health Status:  Mental Health Status: No Current Concerns       Chemical Dependency Status:  Chemical Dependency Status: No Current Concerns             Values/Beliefs:  Spiritual, Cultural Beliefs, Uatsdin Practices, Values that affect care: no               Discussed  Partnership in Safe Discharge Planning  document with patient/family: No    Additional Information:  Initial assessment completed with pt and daughter.  At this time TCU is being recommended at discharge.  Pt and daughter are in agreement and request  referrals to [facility] and [facility].     Next Steps: Accepting TCU    LUIS Bonilla

## 2024-12-05 NOTE — PLAN OF CARE
"  Problem: Adult Inpatient Plan of Care  Goal: Optimal Comfort and Wellbeing  Outcome: Progressing   Goal Outcome Evaluation:                      /53 (BP Location: Right arm)   Pulse 98   Temp 98.4  F (36.9  C) (Oral)   Resp 16   Ht 1.651 m (5' 5\")   Wt 62.6 kg (138 lb)   LMP  (LMP Unknown)   SpO2 94%   BMI 22.96 kg/m      Patient vital signs are at baseline: No,  Reason: Pt requiring 1L O2. Tachycardic  Patient able to ambulate as they were prior to admission or with assist devices provided by therapies during their stay:  No,  Reason:  Pt did not ambulate this shift. Needs to work with therapy  Patient MUST void prior to discharge:  No,  Reason:  carvalho still in place  Patient able to tolerate oral intake:  Yes  Pain has adequate pain control using Oral analgesics:  Yes  Does patient have an identified :  No,  Reason:  Lives alone. Likely needs TCU  Has goal D/C date and time been discussed with patient:  Yes    "

## 2024-12-06 VITALS
BODY MASS INDEX: 22.99 KG/M2 | TEMPERATURE: 97.3 F | RESPIRATION RATE: 18 BRPM | WEIGHT: 138 LBS | HEART RATE: 94 BPM | HEIGHT: 65 IN | DIASTOLIC BLOOD PRESSURE: 58 MMHG | SYSTOLIC BLOOD PRESSURE: 92 MMHG | OXYGEN SATURATION: 91 %

## 2024-12-06 LAB
ANION GAP SERPL CALCULATED.3IONS-SCNC: 12 MMOL/L (ref 7–15)
BUN SERPL-MCNC: 10 MG/DL (ref 8–23)
CALCIUM SERPL-MCNC: 9 MG/DL (ref 8.8–10.4)
CHLORIDE SERPL-SCNC: 95 MMOL/L (ref 98–107)
CREAT SERPL-MCNC: 0.68 MG/DL (ref 0.51–0.95)
EGFRCR SERPLBLD CKD-EPI 2021: 88 ML/MIN/1.73M2
GLUCOSE SERPL-MCNC: 110 MG/DL (ref 70–99)
HCO3 SERPL-SCNC: 31 MMOL/L (ref 22–29)
POTASSIUM SERPL-SCNC: 3.2 MMOL/L (ref 3.4–5.3)
SODIUM SERPL-SCNC: 138 MMOL/L (ref 135–145)

## 2024-12-06 PROCEDURE — 250N000013 HC RX MED GY IP 250 OP 250 PS 637: Performed by: FAMILY MEDICINE

## 2024-12-06 PROCEDURE — 250N000013 HC RX MED GY IP 250 OP 250 PS 637: Performed by: EMERGENCY MEDICINE

## 2024-12-06 PROCEDURE — 80048 BASIC METABOLIC PNL TOTAL CA: CPT | Performed by: FAMILY MEDICINE

## 2024-12-06 PROCEDURE — 99232 SBSQ HOSP IP/OBS MODERATE 35: CPT | Performed by: FAMILY MEDICINE

## 2024-12-06 PROCEDURE — 36415 COLL VENOUS BLD VENIPUNCTURE: CPT | Performed by: FAMILY MEDICINE

## 2024-12-06 PROCEDURE — 82374 ASSAY BLOOD CARBON DIOXIDE: CPT | Performed by: FAMILY MEDICINE

## 2024-12-06 PROCEDURE — 250N000011 HC RX IP 250 OP 636: Performed by: FAMILY MEDICINE

## 2024-12-06 PROCEDURE — 250N000013 HC RX MED GY IP 250 OP 250 PS 637: Performed by: PHYSICIAN ASSISTANT

## 2024-12-06 RX ORDER — METOPROLOL SUCCINATE 25 MG/1
12.5 TABLET, EXTENDED RELEASE ORAL DAILY
DISCHARGE
Start: 2024-12-07 | End: 2024-12-06

## 2024-12-06 RX ORDER — METOPROLOL SUCCINATE 25 MG/1
12.5 TABLET, EXTENDED RELEASE ORAL AT BEDTIME
DISCHARGE
Start: 2024-12-07

## 2024-12-06 RX ORDER — POTASSIUM CHLORIDE 1500 MG/1
20 TABLET, EXTENDED RELEASE ORAL DAILY
Status: DISCONTINUED | OUTPATIENT
Start: 2024-12-06 | End: 2024-12-06 | Stop reason: HOSPADM

## 2024-12-06 RX ORDER — OXYCODONE HYDROCHLORIDE 5 MG/1
5 TABLET ORAL EVERY 4 HOURS PRN
Qty: 20 TABLET | Refills: 0 | Status: SHIPPED | OUTPATIENT
Start: 2024-12-06

## 2024-12-06 RX ORDER — POTASSIUM CHLORIDE 1.5 G/1.58G
20 POWDER, FOR SOLUTION ORAL 2 TIMES DAILY
DISCHARGE
Start: 2024-12-06

## 2024-12-06 RX ORDER — FUROSEMIDE 10 MG/ML
20 INJECTION INTRAMUSCULAR; INTRAVENOUS ONCE
Status: DISCONTINUED | OUTPATIENT
Start: 2024-12-06 | End: 2024-12-06

## 2024-12-06 RX ORDER — ACETAMINOPHEN 325 MG/1
650 TABLET ORAL EVERY 4 HOURS PRN
COMMUNITY
Start: 2024-12-06

## 2024-12-06 RX ORDER — FUROSEMIDE 20 MG/1
20 TABLET ORAL DAILY
DISCHARGE
Start: 2024-12-07

## 2024-12-06 RX ORDER — FUROSEMIDE 20 MG/1
20 TABLET ORAL DAILY
Status: DISCONTINUED | OUTPATIENT
Start: 2024-12-07 | End: 2024-12-06 | Stop reason: HOSPADM

## 2024-12-06 RX ADMIN — FUROSEMIDE 20 MG: 10 INJECTION, SOLUTION INTRAMUSCULAR; INTRAVENOUS at 06:46

## 2024-12-06 RX ADMIN — OXYCODONE 5 MG: 5 TABLET ORAL at 13:47

## 2024-12-06 RX ADMIN — CITALOPRAM HYDROBROMIDE 20 MG: 20 TABLET ORAL at 08:11

## 2024-12-06 RX ADMIN — OXYCODONE 5 MG: 5 TABLET ORAL at 02:19

## 2024-12-06 RX ADMIN — METOPROLOL SUCCINATE 12.5 MG: 25 TABLET, EXTENDED RELEASE ORAL at 10:36

## 2024-12-06 RX ADMIN — THERA TABS 1 TABLET: TAB at 08:11

## 2024-12-06 RX ADMIN — POTASSIUM CHLORIDE 20 MEQ: 1500 TABLET, EXTENDED RELEASE ORAL at 10:30

## 2024-12-06 RX ADMIN — SENNOSIDES AND DOCUSATE SODIUM 1 TABLET: 50; 8.6 TABLET ORAL at 08:11

## 2024-12-06 RX ADMIN — PANTOPRAZOLE SODIUM 40 MG: 40 TABLET, DELAYED RELEASE ORAL at 06:46

## 2024-12-06 RX ADMIN — POLYETHYLENE GLYCOL 3350 17 G: 17 POWDER, FOR SOLUTION ORAL at 08:09

## 2024-12-06 ASSESSMENT — ACTIVITIES OF DAILY LIVING (ADL)
ADLS_ACUITY_SCORE: 39
ADLS_ACUITY_SCORE: 39
ADLS_ACUITY_SCORE: 36
ADLS_ACUITY_SCORE: 39
ADLS_ACUITY_SCORE: 36
ADLS_ACUITY_SCORE: 39
ADLS_ACUITY_SCORE: 36
ADLS_ACUITY_SCORE: 39
ADLS_ACUITY_SCORE: 36
ADLS_ACUITY_SCORE: 39

## 2024-12-06 NOTE — PROGRESS NOTES
Occupational Therapy Discharge Summary    Reason for therapy discharge:    Discharged to transitional care facility.    Progress towards therapy goal(s). See goals on Care Plan in [facility] electronic health record for goal details.  Goals not met.  Barriers to achieving goals:   discharge from facility.    Therapy recommendation(s):    Continued therapy is recommended.  Rationale/Recommendations:  Ensure pt's safety with ADL at home.

## 2024-12-06 NOTE — PLAN OF CARE
Patient vital signs are at baseline: Yes  Patient able to ambulate as they were prior to admission or with assist devices provided by therapies during their stay:  Yes  Patient MUST void prior to discharge:  Yes  Patient able to tolerate oral intake:  Yes  Pain has adequate pain control using Oral analgesics:  Yes  Does patient have an identified :  Yes  Has goal D/C date and time been discussed with patient:  Yes    Patient discharging to TCU this afternoon with family transport.

## 2024-12-06 NOTE — DISCHARGE SUMMARY
ORTHOPEDIC DISCHARGE SUMMARY       Pam Chino,  1945, MRN [id number]    Admission Date: 2024      Admission Diagnoses: Lumbar radicular pain [M54.16]  Lumbar stenosis [M48.061]     Discharge Date:  24     Post-operative Day:  4 Days Post-Op    Reason for Admission: The patient was admitted for the following: Procedure(s):  RIGHT LUMBAR 4-5 TRANSFORAMINAL LUMBAR INTERBODY FUSION    BRIEF HOSPITAL COURSE   Pam Chino is a pleasant 79 year old female who underwent the aforementioned procedure with Dr. Winchester on . There were no intraoperative complications and the patient was transferred to the recovery room and later the orthopedic unit in stable condition. Once the patient reached the orthopedic floor our orthopedic pain protocol was implemented along with the following:    Anticoagulation Medications: None  Therapy: PT and OT  Activity: WBAT  Bracing: None    Consultations during Admission: Hospitalist service for medical management     COMPLICATIONS/SIGNIFICANT FINDINGS    CHF exacerbation noted on  improved on  after 2 doses of lasix.     DISCHARGE INFORMATION   Condition at discharge: Good  Discharge destination: Skilled nursing facility  Patient was seen by myself on the date of discharge.    FOLLOW UP CARE   Follow up with orthopedics in 2 weeks or sooner should the need arise. Ortho will continue to manage pain control, post op anticoagulation and incision care.     Follow up with your PCP for management of chronic medical problems and to evaluate for post op medical complications including constipation, nausea/vomiting, DVT/PE, anemia, changes in blood pressure, fevers/chills, urinary retention and atelectasis/pneumonia.     Subjective   Patient is doing well on POD #1. Pain is well controlled with oral medications. Ambulating. Tolerating oral intake.     Physical Exam   /57 (BP Location: Left arm)   Pulse 103   Temp 97.3  F (36.3  C) (Oral)   Resp 18   Ht 1.651 m  "(5' 5\")   Wt 62.6 kg (138 lb)   LMP  (LMP Unknown)   SpO2 91%   BMI 22.96 kg/m    The patient is A&Ox3. Appears comfortable.   Sensation is intact.  Dorsiflexion and plantar flexion is intact.  Dorsalis pedis pulse intact.  Calves are soft and non-tender. Negative Josefa's.  The incision is covered. Dressing C/D/I    Pertinent Results at Discharge     Hemoglobin   Date/Time Value Ref Range Status   12/02/2024 05:03 PM 10.8 (L) 11.7 - 15.7 g/dL Final   12/02/2024 01:36 PM 10.6 (L) 11.7 - 15.7 g/dL Final       Problem List   Active Problems:    Lumbar spinal stenosis      Zayra Caballero PA-C/Dr. Winchester  [facility]  [phone]  Date: 12/6/2024  Time: 10:03 AM   "

## 2024-12-06 NOTE — PROGRESS NOTES
[facility] MEDICINE PROGRESS NOTE      Identification/Summary: Pam Chino is a 79 year old female with a past medical history of GERD, osteopenia, depression, underwent L4-5 decompression with small dural tear.  Patient was on 48 hours bedrest.  Was tachycardic with heart rate 120s and requiring 1-2 L of oxygen.  Chest x-ray revealed small right pleural effusion.  Chest CT scan no PE.  Small bilateral pleural effusion right more than left.  BNP 5000.  Started on gentle diuresis.  Echocardiogram revealed EF 65 to 70%, mild to moderate tricuspid regurgitation, mild mitral regurgitation.  Will take low-salt diet, Lasix 20 mg daily, CHF education provided.  Follow-up with CHF clinic in 2 weeks. Metoprolol 12.5 mg for tachycardia.  Patient has chronic sinus tachycardia.  Blood pressure is in soft side.  Will take Lasix in the morning and metoprolol at night for prevention of hypotension Patient is asymptomatic.    Assessment and Plan:  Sinus tachycardia, improved  Metoprolol XL 12.5 mg daily PM  Hemoglobin is stable at 10.6    Acute hypoxic respiratory failure  Was requiring 2 L of oxygen, now able to wean off    Acute diastolic CHF exacerbation  Echocardiogram  EF 65 to 70%, mild to moderate tricuspid regurgitation, mild mitral regurgitation.  S/P IV Lasix 20 mg bid X 2  Lasix 20 mg daily  Low-salt diet  Follow-up at CHF clinic in 2 weeks  Monitor blood pressure closely while on Lasix and metoprolol    Hypokalemia, resume replacement    Mood disorder  Has stable mood and affect  Celexa 20 mg daily    GERD  Resume PPI    Code full    Status post L4-5 decompression  Status post bedrest for dural tear  Resume routine postoperative care  Physical and Occupational Therapy  Use incentive spirometry frequently  DVT prophylaxis per orthopedics with early ambulation and SCDs  Pain control with Tylenol 975 mg every 8 hours, 650 mg every 4 hours as needed, oxycodone 5 to 10 mg every 4 hours as  needed    Medically Ready for Discharge: Anticipated Today      Clinically Significant Risk Factors        # Hypokalemia: Lowest K = 3.2 mmol/L in last 2 days, will replace as needed   # Hypochloremia: Lowest Cl = 95 mmol/L in last 2 days, will monitor as appropriate          # Hypertension: Noted on problem list                       Diet: Discharge Instruction - Regular Diet Adult  Regular Diet Adult  Diet  Mejia Catheter: Not present  Lines: None           Johanna Segovia MD  [facility]  Phone: #[phone]  Securely message with the Vocera Web Console (learn more here)  Text page via [employer] Paging/Directory     Interval History/Subjective:  Resting comfortably.  Breathing is much improved.  Had good diuresis.  Tachycardia with minimal activity.  Has sinus tachycardia.  Heart rate improved with metoprolol.  Blood pressure is on lower side.  Patient is asymptomatic.  Back pain is stable.  No new radiculopathy.  Will be discharging to TCU for more rehabilitation. Spoke with her daughter and updated.    Physical Exam/Objective:  Temp:  [97.3  F (36.3  C)-98.2  F (36.8  C)] 97.3  F (36.3  C)  Pulse:  [] 94  Resp:  [17-18] 18  BP: ()/(53-74) 92/58  SpO2:  [88 %-94 %] 91 %  Body mass index is 22.96 kg/m .    GENERAL:  Alert, appears comfortable, in no acute distress, appears stated age   HEAD:  Normocephalic, without obvious abnormality, atraumatic   EYES:  PERRL, conjunctiva clear, EOM's intact   NOSE: Nares normal, mucosa normal, no drainage   THROAT: Lips, mucosa, gums normal, mouth moist   NECK: Supple, symmetrical, trachea midline   BACK:   S/P back surgery   LUNGS:   Bibasilar crackles, no rales, rhonchi, or wheezing, symmetric chest rise on inhalation, respirations unlabored   CHEST WALL:  No tenderness or deformity   HEART:  Regular rate and rhythm, intermittent tachycardia ,S1 and S2 normal, no murmur, rub, or gallop    ABDOMEN:   Soft,  non-tender, bowel sounds active, no masses, no organomegaly, no rebound or guarding   EXTREMITIES: 1+ BLE  edema    SKIN: Dry to touch in both legs   NEURO: Alert, oriented x3, moves all four extremities freely   PSYCH: Cooperative, behavior is appropriate      Data reviewed today: I personally reviewed all new medications, labs, imaging/diagnostics reports over the past 24 hours. Pertinent findings include:    Imaging:   Chest ct  1.  No acute pulmonary embolism.  2.  Small free-flowing bilateral pleural effusions, right larger than left.    Echocardiogram  The visual ejection fraction is 65-70%.  Left ventricular diastolic function is normal.  The right ventricle is normal in size and function.  There is mild (1+) mitral regurgitation.  There is mild to moderate (1-2+) tricuspid regurgitation.  The right ventricular systolic pressure is approximated at 36mmHg    Labs:  Most Recent 3 CBC's:  Recent Labs   Lab Test 12/02/24  1703 12/02/24  1336   HGB 10.8* 10.6*     Most Recent 3 BMP's:  Recent Labs   Lab Test 12/06/24  0846 12/05/24  1825 12/02/24  0832 11/19/24  1152 06/27/24  0929     --   --  141 142   POTASSIUM 3.2*  --   --  4.7 4.8   CHLORIDE 95*  --   --  103 105   CO2 31*  --   --  27 25   BUN 10.0  --   --  16.2 15.9   CR 0.68 0.70  --  0.82 0.95   ANIONGAP 12  --   --  11 12   PIPER 9.0  --   --  10.3 9.6   *  --  110* 96 101*     Most Recent 3 BNP's:  Recent Labs   Lab Test 12/05/24  0906   NTBNPI 4,980*       Medications:   Personally Reviewed.  Medications   Current Facility-Administered Medications   Medication Dose Route Frequency Provider Last Rate Last Admin     Current Facility-Administered Medications   Medication Dose Route Frequency Provider Last Rate Last Admin    citalopram (celeXA) tablet 20 mg  20 mg Oral Daily Sarita Whitfield MD   20 mg at 12/06/24 0811    furosemide (LASIX) injection 20 mg  20 mg Intravenous Once Johanna Segovia MD        [START ON 12/7/2024] furosemide  (LASIX) tablet 20 mg  20 mg Oral Daily Johanna Segovia MD        gabapentin (NEURONTIN) capsule 100 mg  100 mg Oral At Bedtime Mykel Holder PA-C   100 mg at 12/05/24 2104    metoprolol succinate ER (TOPROL-XL) 24 hr half-tab 12.5 mg  12.5 mg Oral Daily Johanna Segovia MD   12.5 mg at 12/06/24 1036    multivitamin, therapeutic (THERA-VIT) tablet 1 tablet  1 tablet Oral Daily Mykel Holder PA-C   1 tablet at 12/06/24 0811    pantoprazole (PROTONIX) EC tablet 40 mg  40 mg Oral QAM Sarita Whitfield MD   40 mg at 12/06/24 0646    polyethylene glycol (MIRALAX) Packet 17 g  17 g Oral Daily Mykel Holder PA-C   17 g at 12/06/24 0809    potassium chloride nidia ER (KLOR-CON M20) CR tablet 20 mEq  20 mEq Oral Daily Johanna Segovia MD   20 mEq at 12/06/24 1030    senna-docusate (SENOKOT-S/PERICOLACE) 8.6-50 MG per tablet 1 tablet  1 tablet Oral BID Mykel Holder PA-C   1 tablet at 12/06/24 0811      Total time spent 50 min

## 2024-12-06 NOTE — PROGRESS NOTES
Care Management Discharge Note    Discharge Date: 12/06/2024       Discharge Disposition: Transitional Care    Discharge Services: None    Discharge DME: None    Discharge Transportation: other (see comments)    Private pay costs discussed: Not applicable    Does the patient's insurance plan have a 3 day qualifying hospital stay waiver?  No    PAS Confirmation Code: Not needed  Patient/family educated on Medicare website which has current facility and service quality ratings: yes    Education Provided on the Discharge Plan: Yes  Persons Notified of Discharge Plans: Pt and daughter   Patient/Family in Agreement with the Plan: yes    Handoff Referral Completed: No, handoff not indicated or clinically appropriate    Additional Information:    [facility] left VM for Dariela with [facility] to see if there is a TCU bed available.      9:13 AM  [facility] talked with Dayan from [facility] and has bed for Pt.  Information sent to Dayan's email as she is working from home today with contact number of [phone].     10:26 AM  Daughter Akanksha called and [facility] updated on discharge plan.  [facility] would like Pt after 3:00 PM.  No PAS needed. Family will transport. Discharge pending medical clearance.     LUIS Syed

## 2024-12-06 NOTE — PROGRESS NOTES
"Orthopedic Progress Note      Assessment: 4 Day Post-Op  S/P Procedure(s):  RIGHT LUMBAR 4-5 TRANSFORAMINAL LUMBAR INTERBODY FUSION @    Plan:   - Continue PT/OT  - Weightbearing status: WBAT can use brace for comfort  - No bending, twisting or lifting more than 10 pounds for 6 weeks.  - Anticoagulation: no chemical prophylaxis in addition to SCDs, madhu stockings and early ambulation.  - Off bed rest since 12/4  - Discharge planning: TCU today      Subjective:  Pain: moderate  Chest pain, SOB: no  Nausea, Vomiting:  no  Lightheadedness, Dizziness:  no  Neuro:  Patient denies new onset numbness or paresthesias    Patient is doing well pain well managed. No other complaints. Right leg pain is improved but still present    Objective:  /57 (BP Location: Left arm)   Pulse 103   Temp 97.3  F (36.3  C) (Oral)   Resp 18   Ht 1.651 m (5' 5\")   Wt 62.6 kg (138 lb)   LMP  (LMP Unknown)   SpO2 91%   BMI 22.96 kg/m    The patient is A&Ox3. Appears comfortable.   Sensation is intact.  Dorsiflexion and plantar flexion is intact.  Dorsalis pedis pulse intact.  Calves are soft and non-tender. Negative Josefa's.  The incision is covered. Dressing C/D/I    Pertinent Labs   Lab Results: personally reviewed.   No results found for: \"INR\", \"PROTIME\"  Lab Results   Component Value Date    HGB 10.8 (L) 12/02/2024     Lab Results   Component Value Date     12/06/2024    CO2 31 (H) 12/06/2024         Report completed by:  Zayra Caballero PA-C/Dr. Winchester  [facility]  12/06/24     "

## 2024-12-06 NOTE — PROGRESS NOTES
Physical Therapy Discharge Summary    Reason for therapy discharge:    Discharged to transitional care facility.    Progress towards therapy goal(s). See goals on Care Plan in [facility] electronic health record for goal details.  Goals not met.  Barriers to achieving goals:   limited tolerance for therapy and discharge from facility.    Therapy recommendation(s):    Continued therapy is recommended.  Rationale/Recommendations:  TCU.

## 2024-12-06 NOTE — PLAN OF CARE
Problem: Adult Inpatient Plan of Care  Goal: Absence of Hospital-Acquired Illness or Injury  Intervention: Prevent Skin Injury  Recent Flowsheet Documentation  Taken 12/6/2024 0232 by Yoly Barahona RN  Body Position:   side-lying   right   position changed independently  Taken 12/6/2024 0037 by Yoly Barahona RN  Body Position:   side-lying   left   position changed independently  Taken 12/5/2024 2121 by Yoly Barahona RN  Body Position:   side-lying   right     Problem: Adult Inpatient Plan of Care  Goal: Optimal Comfort and Wellbeing  Outcome: Progressing  Intervention: Monitor Pain and Promote Comfort  Recent Flowsheet Documentation  Taken 12/6/2024 0219 by Yoly Barahona RN  Pain Management Interventions:   medication (see MAR)   repositioned   rest   quiet environment facilitated  Taken 12/5/2024 2104 by Yoly Barahona RN  Pain Management Interventions:   medication (see MAR)   repositioned   rest   Goal Outcome Evaluation:    Patient vital signs are at baseline: No,  Reason:  required 1L O2 overnight, on RA now  Patient able to ambulate as they were prior to admission or with assist devices provided by therapies during their stay:  Yes, assist x1 with walker and gait belt  Patient MUST void prior to discharge:  Yes  Patient able to tolerate oral intake:  Yes  Pain has adequate pain control using Oral analgesics:  Yes  Does patient have an identified :  Yes  Has goal D/C date and time been discussed with patient:  Yes, pending TCU placement

## 2024-12-06 NOTE — PROGRESS NOTES
Per [facility] RN discharge teaching done, ready to discharge to TCU, has packet.  Patient transported via wheelchair via NA.  Daughter providing transport.

## 2024-12-23 ENCOUNTER — TELEPHONE (OUTPATIENT)
Dept: INTERNAL MEDICINE | Facility: CLINIC | Age: 79
End: 2024-12-23
Payer: COMMERCIAL

## 2024-12-23 DIAGNOSIS — M81.0 SENILE OSTEOPOROSIS: Primary | ICD-10-CM

## 2024-12-23 DIAGNOSIS — Z92.29 PERSONAL HISTORY OF OTHER DRUG THERAPY: ICD-10-CM

## 2024-12-23 NOTE — TELEPHONE ENCOUNTER
Reason for Call:  Appointment Request    Patient requesting this type of appt:  prolia    Requested provider:  Timmy Ndiaye     Reason patient unable to be scheduled: Not able to schedule    When does patient want to be seen/preferred time: 3-7 days    Comments: wants an appt soon    Could we send this information to you in [facility] or would you prefer to receive a phone call?:   Patient would prefer a phone call   Okay to leave a detailed message?: Yes at home number on file:    Telephone Information:   Home [phone]       Call taken on 12/23/2024 at 8:20 AM by Lissa Hines

## 2024-12-23 NOTE — TELEPHONE ENCOUNTER
Last Prolia 6/11/24 due on 12/11/24.     Patient had back surgery on December 2nd- wondering if it is still okay to get the Prolia injection?     Scheduled patient for 1/7/24-needs new prolia order, pended and routed to Dr. Ndiaye.     Carolina CHANDLER RN

## 2024-12-27 ENCOUNTER — ANESTHESIA EVENT (OUTPATIENT)
Dept: SURGERY | Facility: CLINIC | Age: 79
End: 2024-12-27
Payer: COMMERCIAL

## 2024-12-27 ENCOUNTER — APPOINTMENT (OUTPATIENT)
Dept: CT IMAGING | Facility: CLINIC | Age: 79
End: 2024-12-27
Attending: EMERGENCY MEDICINE
Payer: COMMERCIAL

## 2024-12-27 ENCOUNTER — APPOINTMENT (OUTPATIENT)
Dept: MRI IMAGING | Facility: CLINIC | Age: 79
End: 2024-12-27
Attending: EMERGENCY MEDICINE
Payer: COMMERCIAL

## 2024-12-27 ENCOUNTER — HOSPITAL ENCOUNTER (INPATIENT)
Facility: CLINIC | Age: 79
End: 2024-12-27
Attending: EMERGENCY MEDICINE | Admitting: ORTHOPAEDIC SURGERY
Payer: COMMERCIAL

## 2024-12-27 DIAGNOSIS — G96.11 DURAL TEAR: ICD-10-CM

## 2024-12-27 DIAGNOSIS — M21.371 RIGHT FOOT DROP: Primary | ICD-10-CM

## 2024-12-27 DIAGNOSIS — T14.8XXA WOUND DRAINAGE: ICD-10-CM

## 2024-12-27 DIAGNOSIS — M48.061 SPINAL STENOSIS OF LUMBAR REGION, UNSPECIFIED WHETHER NEUROGENIC CLAUDICATION PRESENT: ICD-10-CM

## 2024-12-27 DIAGNOSIS — R51.9 NONINTRACTABLE HEADACHE, UNSPECIFIED CHRONICITY PATTERN, UNSPECIFIED HEADACHE TYPE: ICD-10-CM

## 2024-12-27 LAB
ANION GAP SERPL CALCULATED.3IONS-SCNC: 13 MMOL/L (ref 7–15)
BASOPHILS # BLD AUTO: 0 10E3/UL (ref 0–0.2)
BASOPHILS NFR BLD AUTO: 0 %
BUN SERPL-MCNC: 13 MG/DL (ref 8–23)
CALCIUM SERPL-MCNC: 9.4 MG/DL (ref 8.8–10.4)
CHLORIDE SERPL-SCNC: 99 MMOL/L (ref 98–107)
CREAT SERPL-MCNC: 0.82 MG/DL (ref 0.51–0.95)
CRP SERPL-MCNC: 7.32 MG/L
EGFRCR SERPLBLD CKD-EPI 2021: 72 ML/MIN/1.73M2
EOSINOPHIL # BLD AUTO: 0.2 10E3/UL (ref 0–0.7)
EOSINOPHIL NFR BLD AUTO: 3 %
ERYTHROCYTE [DISTWIDTH] IN BLOOD BY AUTOMATED COUNT: 13.7 % (ref 10–15)
FERRITIN SERPL-MCNC: 36 NG/ML (ref 11–328)
FLUAV RNA SPEC QL NAA+PROBE: NEGATIVE
FLUBV RNA RESP QL NAA+PROBE: NEGATIVE
GLUCOSE SERPL-MCNC: 130 MG/DL (ref 70–99)
HCO3 SERPL-SCNC: 26 MMOL/L (ref 22–29)
HCT VFR BLD AUTO: 29.5 % (ref 35–47)
HGB BLD-MCNC: 9.5 G/DL (ref 11.7–15.7)
IMM GRANULOCYTES # BLD: 0 10E3/UL
IMM GRANULOCYTES NFR BLD: 0 %
IRON BINDING CAPACITY (ROCHE): 304 UG/DL (ref 240–430)
IRON SATN MFR SERPL: 6 % (ref 15–46)
IRON SERPL-MCNC: 17 UG/DL (ref 37–145)
LYMPHOCYTES # BLD AUTO: 0.4 10E3/UL (ref 0.8–5.3)
LYMPHOCYTES NFR BLD AUTO: 6 %
MCH RBC QN AUTO: 29.6 PG (ref 26.5–33)
MCHC RBC AUTO-ENTMCNC: 32.2 G/DL (ref 31.5–36.5)
MCV RBC AUTO: 92 FL (ref 78–100)
MONOCYTES # BLD AUTO: 0.6 10E3/UL (ref 0–1.3)
MONOCYTES NFR BLD AUTO: 9 %
NEUTROPHILS # BLD AUTO: 5.8 10E3/UL (ref 1.6–8.3)
NEUTROPHILS NFR BLD AUTO: 82 %
NRBC # BLD AUTO: 0 10E3/UL
NRBC BLD AUTO-RTO: 0 /100
NT-PROBNP SERPL-MCNC: 1197 PG/ML (ref 0–1800)
PLATELET # BLD AUTO: 354 10E3/UL (ref 150–450)
POTASSIUM SERPL-SCNC: 3.6 MMOL/L (ref 3.4–5.3)
RBC # BLD AUTO: 3.21 10E6/UL (ref 3.8–5.2)
RSV RNA SPEC NAA+PROBE: NEGATIVE
SARS-COV-2 RNA RESP QL NAA+PROBE: NEGATIVE
SODIUM SERPL-SCNC: 138 MMOL/L (ref 135–145)
TROPONIN T SERPL HS-MCNC: 15 NG/L
TROPONIN T SERPL HS-MCNC: 17 NG/L
WBC # BLD AUTO: 7.1 10E3/UL (ref 4–11)

## 2024-12-27 PROCEDURE — 83880 ASSAY OF NATRIURETIC PEPTIDE: CPT | Performed by: EMERGENCY MEDICINE

## 2024-12-27 PROCEDURE — 87637 SARSCOV2&INF A&B&RSV AMP PRB: CPT | Performed by: INTERNAL MEDICINE

## 2024-12-27 PROCEDURE — 255N000002 HC RX 255 OP 636: Performed by: EMERGENCY MEDICINE

## 2024-12-27 PROCEDURE — 72158 MRI LUMBAR SPINE W/O & W/DYE: CPT

## 2024-12-27 PROCEDURE — 70450 CT HEAD/BRAIN W/O DYE: CPT

## 2024-12-27 PROCEDURE — 250N000013 HC RX MED GY IP 250 OP 250 PS 637: Performed by: EMERGENCY MEDICINE

## 2024-12-27 PROCEDURE — 84466 ASSAY OF TRANSFERRIN: CPT | Performed by: INTERNAL MEDICINE

## 2024-12-27 PROCEDURE — 85014 HEMATOCRIT: CPT | Performed by: EMERGENCY MEDICINE

## 2024-12-27 PROCEDURE — 82607 VITAMIN B-12: CPT | Performed by: INTERNAL MEDICINE

## 2024-12-27 PROCEDURE — 82435 ASSAY OF BLOOD CHLORIDE: CPT | Performed by: EMERGENCY MEDICINE

## 2024-12-27 PROCEDURE — A9585 GADOBUTROL INJECTION: HCPCS | Performed by: EMERGENCY MEDICINE

## 2024-12-27 PROCEDURE — 99223 1ST HOSP IP/OBS HIGH 75: CPT | Performed by: INTERNAL MEDICINE

## 2024-12-27 PROCEDURE — 83540 ASSAY OF IRON: CPT | Performed by: INTERNAL MEDICINE

## 2024-12-27 PROCEDURE — 96374 THER/PROPH/DIAG INJ IV PUSH: CPT | Mod: 59

## 2024-12-27 PROCEDURE — 250N000013 HC RX MED GY IP 250 OP 250 PS 637: Performed by: INTERNAL MEDICINE

## 2024-12-27 PROCEDURE — 71275 CT ANGIOGRAPHY CHEST: CPT

## 2024-12-27 PROCEDURE — 84484 ASSAY OF TROPONIN QUANT: CPT | Performed by: EMERGENCY MEDICINE

## 2024-12-27 PROCEDURE — 250N000011 HC RX IP 250 OP 636: Performed by: EMERGENCY MEDICINE

## 2024-12-27 PROCEDURE — 83550 IRON BINDING TEST: CPT | Performed by: INTERNAL MEDICINE

## 2024-12-27 PROCEDURE — 82728 ASSAY OF FERRITIN: CPT | Performed by: INTERNAL MEDICINE

## 2024-12-27 PROCEDURE — 93005 ELECTROCARDIOGRAM TRACING: CPT | Performed by: EMERGENCY MEDICINE

## 2024-12-27 PROCEDURE — 80048 BASIC METABOLIC PNL TOTAL CA: CPT | Performed by: EMERGENCY MEDICINE

## 2024-12-27 PROCEDURE — 36415 COLL VENOUS BLD VENIPUNCTURE: CPT | Performed by: EMERGENCY MEDICINE

## 2024-12-27 PROCEDURE — 120N000001 HC R&B MED SURG/OB

## 2024-12-27 PROCEDURE — 99285 EMERGENCY DEPT VISIT HI MDM: CPT | Mod: 25

## 2024-12-27 PROCEDURE — 85004 AUTOMATED DIFF WBC COUNT: CPT | Performed by: EMERGENCY MEDICINE

## 2024-12-27 PROCEDURE — 86140 C-REACTIVE PROTEIN: CPT | Performed by: EMERGENCY MEDICINE

## 2024-12-27 RX ORDER — ONDANSETRON 2 MG/ML
4 INJECTION INTRAMUSCULAR; INTRAVENOUS EVERY 6 HOURS PRN
Status: DISCONTINUED | OUTPATIENT
Start: 2024-12-27 | End: 2025-01-06 | Stop reason: HOSPADM

## 2024-12-27 RX ORDER — METHOCARBAMOL 500 MG/1
500 TABLET, FILM COATED ORAL 3 TIMES DAILY
COMMUNITY
Start: 2024-12-16

## 2024-12-27 RX ORDER — PANTOPRAZOLE SODIUM 40 MG/1
40 TABLET, DELAYED RELEASE ORAL EVERY MORNING
Status: DISCONTINUED | OUTPATIENT
Start: 2024-12-28 | End: 2025-01-06 | Stop reason: HOSPADM

## 2024-12-27 RX ORDER — METHYLPREDNISOLONE 4 MG/1
TABLET ORAL SEE ADMIN INSTRUCTIONS
Status: ON HOLD | COMMUNITY
Start: 2024-12-19 | End: 2025-01-03

## 2024-12-27 RX ORDER — GADOBUTROL 604.72 MG/ML
6.2 INJECTION INTRAVENOUS ONCE
Status: COMPLETED | OUTPATIENT
Start: 2024-12-27 | End: 2024-12-27

## 2024-12-27 RX ORDER — LIDOCAINE 40 MG/G
CREAM TOPICAL
Status: DISCONTINUED | OUTPATIENT
Start: 2024-12-27 | End: 2025-01-06 | Stop reason: HOSPADM

## 2024-12-27 RX ORDER — ONDANSETRON 2 MG/ML
4 INJECTION INTRAMUSCULAR; INTRAVENOUS ONCE
Status: COMPLETED | OUTPATIENT
Start: 2024-12-27 | End: 2024-12-27

## 2024-12-27 RX ORDER — AMOXICILLIN 250 MG
2 CAPSULE ORAL 2 TIMES DAILY PRN
Status: DISCONTINUED | OUTPATIENT
Start: 2024-12-27 | End: 2025-01-04

## 2024-12-27 RX ORDER — CITALOPRAM HYDROBROMIDE 20 MG/1
20 TABLET ORAL EVERY MORNING
Status: DISCONTINUED | OUTPATIENT
Start: 2024-12-28 | End: 2025-01-06 | Stop reason: HOSPADM

## 2024-12-27 RX ORDER — GABAPENTIN 100 MG/1
100 CAPSULE ORAL 3 TIMES DAILY
COMMUNITY
Start: 2024-12-16

## 2024-12-27 RX ORDER — PROCHLORPERAZINE MALEATE 5 MG/1
5 TABLET ORAL EVERY 6 HOURS PRN
Status: DISCONTINUED | OUTPATIENT
Start: 2024-12-27 | End: 2025-01-06 | Stop reason: HOSPADM

## 2024-12-27 RX ORDER — OXYCODONE HYDROCHLORIDE 5 MG/1
5 TABLET ORAL EVERY 4 HOURS PRN
Status: DISCONTINUED | OUTPATIENT
Start: 2024-12-27 | End: 2024-12-30 | Stop reason: ALTCHOICE

## 2024-12-27 RX ORDER — CALCIUM CARBONATE 500 MG/1
1000 TABLET, CHEWABLE ORAL 4 TIMES DAILY PRN
Status: DISCONTINUED | OUTPATIENT
Start: 2024-12-27 | End: 2025-01-06 | Stop reason: HOSPADM

## 2024-12-27 RX ORDER — AMOXICILLIN 250 MG
1 CAPSULE ORAL 2 TIMES DAILY PRN
COMMUNITY
Start: 2024-12-16

## 2024-12-27 RX ORDER — FUROSEMIDE 20 MG/1
20 TABLET ORAL DAILY
Status: DISCONTINUED | OUTPATIENT
Start: 2024-12-28 | End: 2025-01-06 | Stop reason: HOSPADM

## 2024-12-27 RX ORDER — SODIUM CHLORIDE 9 MG/ML
INJECTION, SOLUTION INTRAVENOUS CONTINUOUS
Status: DISCONTINUED | OUTPATIENT
Start: 2024-12-28 | End: 2025-01-01

## 2024-12-27 RX ORDER — ACETAMINOPHEN 325 MG/1
650 TABLET ORAL ONCE
Status: COMPLETED | OUTPATIENT
Start: 2024-12-27 | End: 2024-12-27

## 2024-12-27 RX ORDER — GABAPENTIN 100 MG/1
100 CAPSULE ORAL 3 TIMES DAILY
Status: DISCONTINUED | OUTPATIENT
Start: 2024-12-27 | End: 2025-01-06 | Stop reason: HOSPADM

## 2024-12-27 RX ORDER — IBUPROFEN 200 MG
400 TABLET ORAL 3 TIMES DAILY
Status: ON HOLD | COMMUNITY
End: 2025-01-03

## 2024-12-27 RX ORDER — IOPAMIDOL 755 MG/ML
75 INJECTION, SOLUTION INTRAVASCULAR ONCE
Status: COMPLETED | OUTPATIENT
Start: 2024-12-27 | End: 2024-12-27

## 2024-12-27 RX ORDER — ONDANSETRON 4 MG/1
4 TABLET, ORALLY DISINTEGRATING ORAL EVERY 6 HOURS PRN
Status: DISCONTINUED | OUTPATIENT
Start: 2024-12-27 | End: 2025-01-06 | Stop reason: HOSPADM

## 2024-12-27 RX ORDER — AMOXICILLIN 250 MG
1 CAPSULE ORAL 2 TIMES DAILY PRN
Status: DISCONTINUED | OUTPATIENT
Start: 2024-12-27 | End: 2025-01-04

## 2024-12-27 RX ORDER — METHOCARBAMOL 500 MG/1
500 TABLET, FILM COATED ORAL 3 TIMES DAILY
Status: DISCONTINUED | OUTPATIENT
Start: 2024-12-27 | End: 2025-01-06 | Stop reason: HOSPADM

## 2024-12-27 RX ADMIN — IOPAMIDOL 75 ML: 755 INJECTION, SOLUTION INTRAVENOUS at 17:18

## 2024-12-27 RX ADMIN — METHOCARBAMOL 500 MG: 500 TABLET ORAL at 22:25

## 2024-12-27 RX ADMIN — ACETAMINOPHEN 650 MG: 325 TABLET ORAL at 17:46

## 2024-12-27 RX ADMIN — METOPROLOL SUCCINATE ER TABLETS 12.5 MG: 25 TABLET, FILM COATED, EXTENDED RELEASE ORAL at 22:25

## 2024-12-27 RX ADMIN — GABAPENTIN 100 MG: 100 CAPSULE ORAL at 22:25

## 2024-12-27 RX ADMIN — ONDANSETRON 4 MG: 2 INJECTION, SOLUTION INTRAMUSCULAR; INTRAVENOUS at 14:57

## 2024-12-27 RX ADMIN — GADOBUTROL 6.2 ML: 604.72 INJECTION INTRAVENOUS at 16:09

## 2024-12-27 ASSESSMENT — ACTIVITIES OF DAILY LIVING (ADL)
ADLS_ACUITY_SCORE: 59
ADLS_ACUITY_SCORE: 60

## 2024-12-27 ASSESSMENT — COLUMBIA-SUICIDE SEVERITY RATING SCALE - C-SSRS
6. HAVE YOU EVER DONE ANYTHING, STARTED TO DO ANYTHING, OR PREPARED TO DO ANYTHING TO END YOUR LIFE?: NO
2. HAVE YOU ACTUALLY HAD ANY THOUGHTS OF KILLING YOURSELF IN THE PAST MONTH?: NO
1. IN THE PAST MONTH, HAVE YOU WISHED YOU WERE DEAD OR WISHED YOU COULD GO TO SLEEP AND NOT WAKE UP?: NO

## 2024-12-27 NOTE — ED TRIAGE NOTES
Patient arrives to the ER after being referred over here from the clinic. Patient had spinal surgery about 3.5 weeks ago. Currently, she is having increased pain and leakage at the sight. She now endorses chest pain. Denies shortness of breath.     Triage Assessment (Adult)       Row Name 12/27/24 1300          Triage Assessment    Airway WDL WDL        Respiratory WDL    Respiratory WDL WDL        Skin Circulation/Temperature WDL    Skin Circulation/Temperature WDL WDL        Cardiac WDL    Cardiac WDL X;chest pain        Chest Pain Assessment    Chest Pain Location midsternal     Chest Pain Intervention 12-lead ECG obtained        Peripheral/Neurovascular WDL    Peripheral Neurovascular WDL WDL        Cognitive/Neuro/Behavioral WDL    Cognitive/Neuro/Behavioral WDL WDL

## 2024-12-27 NOTE — ED NOTES
Expected Patient Referral to ED  12:35 PM    Referring Clinic/Provider:  Dr. Ranulfo Whitley    Reason for referral/Clinical facts:  Lumbar Incision opened up, serious drainage, dural tear history, 1 month out  Recommends MRI without lumbar than call ortho. No need for labs currently. Infection unlikely. Call his Cellphone. Ask Emil for his cellphone    Recommendations provided:  Send to ED for further evaluation    Caller was informed that this institution does possess the capabilities and/or resources to provide for patient and should be transferred to our facility.    Discussed that if direct admit is sought and any hurdles are encountered, this ED would be happy to see the patient and evaluate.    Informed caller that recommendations provided are recommendations based only on the facts provided and that they responsible to accept or reject the advice, or to seek a formal in person consultation as needed and that this ED will see/treat patient should they arrive.      Bernard Stein MD  [facility] EMERGENCY ROOM  [facility] [facility] [id number] [phone]       Bernard Stein MD  12/27/24 1230

## 2024-12-27 NOTE — ED PROVIDER NOTES
EMERGENCY DEPARTMENT ENCOUNTER      NAME: Pam Chino  AGE: 79 year old female  YOB: 1945  MRN: [id number]  EVALUATION DATE & TIME: 12/27/2024  2:13 PM    PCP: Emmy Ewing    ED PROVIDER: Bernard Stein MD      Chief Complaint   Patient presents with    Chest Pain    Wound Check         FINAL IMPRESSION:  1. Wound drainage    2. Nonintractable headache, unspecified chronicity pattern, unspecified headache type          ED COURSE & MEDICAL DECISION MAKING:    Pertinent Labs & Imaging studies reviewed. (See chart for details)  79 year old female presents to the Emergency Department for evaluation of chest pain, positional headache, and leaking fluid from low back surgical incision from 1 month ago    I took a phone call from Dr. Aureliano Higuera spine who states that patient was at clinic today and developed abrupt onset of wound leaking from low back there is reportedly clear.  Reportedly no fever or headache    He recommend MRI without contrast and a call his cell phone number    Patient subsequently developed headache and nausea and vomiting headache seems positional.  Headaches been present since her spinal surgery.  Likely spinal leak headache.    No fever.  No altered mental status.  No neck pain.  Bacterial meningitis unlike    Also has chest pain which patient thinks is from anxiety    Differential includes ACS, pulmonary emboli, pneumonia, aortic dissection, esophageal rupture, pneumothorax, pneumonia, malignancy, pleurisy, shingles, and GERD.  Based on history and examination pulmonary emboli and aortic dissection unlikely.     Plan for CT head, CTA PE since high risk for pulmonary emboli, troponin, CRP, proBNP, BMP, CBC, MRI low back with and without contrast    Given Zofran    Signout to Dr. Oliver pending completion of CT imaging and MRI and discussion with Dr. Aureliano Higuera spine       Medical Decision Making  Obtained supplemental history:Supplemental history obtained?: Family  Member/Significant Other  Reviewed external records: External records reviewed?: Outpatient Record: 12/02/2024-12/06/2024 [facility]  Care impacted by chronic illness:Documented in Chart  Did you consider but not order tests?: Work up considered but not performed and documented in chart, if applicable  Did you interpret images independently?: Independent interpretation of ECG and images noted in documentation, when applicable.  Consultation discussion with other provider:Did you involve another provider (consultant, , pharmacy, etc.)?: I discussed the care with another health care provider, see documentation for details.  Admission considered. Patient was signed out to the oncoming physician, disposition pending.    MIPS: CT Pulmonary Angiogram:Patient is moderate to high risk for PE.            At the conclusion of the encounter I discussed the results of all of the tests and the disposition. The questions were answered. The patient or family acknowledged understanding and was agreeable with the care plan.         MEDICATIONS GIVEN IN THE EMERGENCY:  Medications   ondansetron (ZOFRAN) injection 4 mg (4 mg Intravenous $Given 12/27/24 3804)       NEW PRESCRIPTIONS STARTED AT TODAY'S ER VISIT  New Prescriptions    No medications on file          =================================================================    TRIAGE ASSESSMENT:  Patient arrives to the ER after being referred over here from the clinic. Patient had spinal surgery about 3.5 weeks ago. Currently, she is having increased pain and leakage at the sight. She now endorses chest pain. Denies shortness of breath.     Triage Assessment (Adult)       Row Name 12/27/24 1300          Triage Assessment    Airway WDL WDL        Respiratory WDL    Respiratory WDL WDL        Skin Circulation/Temperature WDL    Skin Circulation/Temperature WDL WDL        Cardiac WDL    Cardiac WDL X;chest pain        Chest Pain Assessment    Chest Pain Location  midsternal     Chest Pain Intervention 12-lead ECG obtained        Peripheral/Neurovascular WDL    Peripheral Neurovascular WDL WDL        Cognitive/Neuro/Behavioral WDL    Cognitive/Neuro/Behavioral WDL WDL                          HPI    Patient information was obtained from: Patient    Use of : N/A         Pam Chino is a 79 year old female with a pertinent history of hypertension, osteoporosis, mental health, GERD, lumbar spinal stenosis, who presents to this ED via private car for evaluation of chest pain.    Patient states she has chest pain and nausea. Patient states she has soreness in her chest that began when she went to the clinic today. Patient states she has had a recurring headache since her surgery that improves when lying down. Patient states she has clear fluid draining from her back that began when she was in the clinic today. Of note, patient had a spinal surgery on 12/02/2024.    Patient does not endorse fever, or any other concerns at this time.      REVIEW OF SYSTEMS   Review of Systems     PAST MEDICAL HISTORY:  Past Medical History:   Diagnosis Date    Anxiety     Basal cell carcinoma     Chest pain 12/22/2011    Depression     Gastritis     GERD (gastroesophageal reflux disease)     Nodular basal cell carcinoma 07/17/2014    Left distal calf     Osteoporosis     Patella fracture 02/04/2006    Scoliosis        PAST SURGICAL HISTORY:  Past Surgical History:   Procedure Laterality Date    COLONOSCOPY      FUSION TRANSFORAMINAL LUMBAR INTERBODY, 1 LEVEL, POSTERIOR APPROACH Right 12/2/2024    Procedure: RIGHT LUMBAR 4-5 TRANSFORAMINAL LUMBAR INTERBODY FUSION;  Surgeon: Vidal Winchester MD;  Location: [facility] OR    MOHS MICROGRAPHIC PROCEDURE Left 2014    BCE distal calf    OTHER SURGICAL HISTORY  03/13/1997    laser cone biopsy    OTHER SURGICAL HISTORY  Oct. 2015    nose bxCA    SKIN CANCER EXCISION      maru. leg- in office    UPPER GASTROINTESTINAL ENDOSCOPY      [facility]  "TOTAL KNEE ARTHROPLASTY Right 2015    Procedure: RIGHT KNEE TOTAL ARTHROPLASTY;  Surgeon: Benny Lopez MD;  Location: [facility] OR;  Service: Orthopedics           CURRENT MEDICATIONS:    acetaminophen (TYLENOL) 325 MG tablet  amoxicillin (AMOXIL) 500 MG capsule  CALCIUM CITRATE/VITAMIN D3 (CALCIUM CITRATE + D ORAL)  cholecalciferol, vitamin D3, (VITAMIN D3) 2,000 unit Tab  citalopram (CELEXA) 20 MG tablet  furosemide (LASIX) 20 MG tablet  metoprolol succinate ER (TOPROL XL) 25 MG 24 hr tablet  multivitamin (MULTIVITAMIN) per tablet  omeprazole (PRILOSEC) 20 MG capsule  oxyCODONE (ROXICODONE) 5 MG tablet  potassium chloride (KLOR-CON) 20 MEQ packet        ALLERGIES:  Allergies   Allergen Reactions    Ibuprofen Other (See Comments)     Stomach Bleed     Other Environmental Allergy Other (See Comments)     Bandaids cause redness       FAMILY HISTORY:  Family History   Problem Relation Age of Onset    Chronic Obstructive Pulmonary Disease Father        SOCIAL HISTORY:   Social History     Socioeconomic History    Marital status:    Tobacco Use    Smoking status: Former     Current packs/day: 0.00     Types: Cigarettes     Start date: 11/10/1975     Quit date: 11/10/1980     Years since quittin.1     Passive exposure: Past    Smokeless tobacco: Never    Tobacco comments:     \"socially\"   Vaping Use    Vaping status: Never Used   Substance and Sexual Activity    Alcohol use: Yes     Comment: Alcoholic Drinks/day: social    Drug use: No     Social Drivers of Health     Financial Resource Strain: Low Risk  (2024)    Financial Resource Strain     Within the past 12 months, have you or your family members you live with been unable to get utilities (heat, electricity) when it was really needed?: No   Food Insecurity: Low Risk  (2024)    Food Insecurity     Within the past 12 months, did you worry that your food would run out before you got money to buy more?: No     Within the past 12 " "months, did the food you bought just not last and you didn t have money to get more?: No   Transportation Needs: Low Risk  (12/2/2024)    Transportation Needs     Within the past 12 months, has lack of transportation kept you from medical appointments, getting your medicines, non-medical meetings or appointments, work, or from getting things that you need?: No   Interpersonal Safety: Low Risk  (12/2/2024)    Interpersonal Safety     Do you feel physically and emotionally safe where you currently live?: Yes     Within the past 12 months, have you been hit, slapped, kicked or otherwise physically hurt by someone?: No     Within the past 12 months, have you been humiliated or emotionally abused in other ways by your partner or ex-partner?: No   Housing Stability: High Risk (12/2/2024)    Housing Stability     Do you have housing? : No     Are you worried about losing your housing?: No       VITALS:  /71 (BP Location: Left arm, Patient Position: Semi-Duran's, Cuff Size: Adult Regular)   Pulse 97   Temp 99.6  F (37.6  C) (Oral)   Resp 19   Ht 1.651 m (5' 5\")   Wt 62.6 kg (138 lb)   LMP  (LMP Unknown)   SpO2 96%   BMI 22.96 kg/m      PHYSICAL EXAM      Vitals: /71 (BP Location: Left arm, Patient Position: Semi-Duran's, Cuff Size: Adult Regular)   Pulse 97   Temp 99.6  F (37.6  C) (Oral)   Resp 19   Ht 1.651 m (5' 5\")   Wt 62.6 kg (138 lb)   LMP  (LMP Unknown)   SpO2 96%   BMI 22.96 kg/m    General: Appears in no acute distress, awake, alert, interactive.  Clutching emesis basin  Eyes: Conjunctivae non-injected. Sclera anicteric.  HENT: Atraumatic.  Neck: Supple.  Respiratory/Chest: Respiration unlabored.  Abdomen: non distended  Musculoskeletal: Normal extremities. No edema or erythema.  Skin: Normal color. No rash or diaphoresis. Soft tissue swelling around low back lumbar incision.  No obvious wound dehiscenc.  moisture on sweatshirt and bandage.  Neurologic: Face symmetric, moves all " extremities spontaneously. Speech clear.  Psychiatric: Oriented to person, place, and time. Affect appropriate.    LAB:  All pertinent labs reviewed and interpreted.  Results for orders placed or performed during the hospital encounter of 12/27/24   Basic metabolic panel   Result Value Ref Range    Sodium 138 135 - 145 mmol/L    Potassium 3.6 3.4 - 5.3 mmol/L    Chloride 99 98 - 107 mmol/L    Carbon Dioxide (CO2) 26 22 - 29 mmol/L    Anion Gap 13 7 - 15 mmol/L    Urea Nitrogen 13.0 8.0 - 23.0 mg/dL    Creatinine 0.82 0.51 - 0.95 mg/dL    GFR Estimate 72 >60 mL/min/1.73m2    Calcium 9.4 8.8 - 10.4 mg/dL    Glucose 130 (H) 70 - 99 mg/dL   Result Value Ref Range    Troponin T, High Sensitivity 17 (H) <=14 ng/L   Nt probnp inpatient   Result Value Ref Range    N terminal Pro BNP Inpatient 1,197 0 - 1,800 pg/mL   Result Value Ref Range    CRP Inflammation 7.32 (H) <5.00 mg/L   CBC with platelets and differential   Result Value Ref Range    WBC Count 7.1 4.0 - 11.0 10e3/uL    RBC Count 3.21 (L) 3.80 - 5.20 10e6/uL    Hemoglobin 9.5 (L) 11.7 - 15.7 g/dL    Hematocrit 29.5 (L) 35.0 - 47.0 %    MCV 92 78 - 100 fL    MCH 29.6 26.5 - 33.0 pg    MCHC 32.2 31.5 - 36.5 g/dL    RDW 13.7 10.0 - 15.0 %    Platelet Count 354 150 - 450 10e3/uL    % Neutrophils 82 %    % Lymphocytes 6 %    % Monocytes 9 %    % Eosinophils 3 %    % Basophils 0 %    % Immature Granulocytes 0 %    NRBCs per 100 WBC 0 <1 /100    Absolute Neutrophils 5.8 1.6 - 8.3 10e3/uL    Absolute Lymphocytes 0.4 (L) 0.8 - 5.3 10e3/uL    Absolute Monocytes 0.6 0.0 - 1.3 10e3/uL    Absolute Eosinophils 0.2 0.0 - 0.7 10e3/uL    Absolute Basophils 0.0 0.0 - 0.2 10e3/uL    Absolute Immature Granulocytes 0.0 <=0.4 10e3/uL    Absolute NRBCs 0.0 10e3/uL       RADIOLOGY:  Reviewed all pertinent imaging. Please see official radiology report.  CT Chest Pulmonary Embolism w Contrast    (Results Pending)   MR Lumbar Spine w/o & w Contrast    (Results Pending)   CT Head w/o Contrast     (Results Pending)       EKG:    Performed at: 12/27/2024 13:08    Impression: Sinus rhythm  Normal ECG  Questionable change in QRS axis  T wave amplitude has increased in inferior leads.    Rate: 95 BPM  Rhythm: Sinus rhythm  Axis: 85 79 78  ND Interval: 124 ms  QRS Interval: 76 ms  QTc Interval: 336/422 ms  ST Changes: T wave amplitude has increased in inferior leads  Comparison: 11/11/2023 12:49    I have independently reviewed and interpreted the EKG(s) documented above.          I, Benito Sanchez, am serving as a scribe to document services personally performed by Bernard Stein MD based on my observation and the provider's statements to me. I, Bernard Stein MD, attest that Benito Sanchez is acting in a scribe capacity, has observed my performance of the services and has documented them in accordance with my direction.    Bernard Stein MD  [facility] EMERGENCY ROOM  [facility] [facility] [address]  [phone]      Bernard Stein MD  12/27/24 5335

## 2024-12-27 NOTE — MEDICATION SCRIBE - ADMISSION MEDICATION HISTORY
Medication Scribe Admission Medication History    Admission medication history is complete. The information provided in this note is only as accurate as the sources available at the time of the update.    Information Source(s): Patient, Family member, and CareEverywhere/SureScripts via in-person    Pertinent Information: Spoke with the patient and her daughter for a medication history. Currently has taken AM meds only today PTA.     Taking 400 mg of ibuprofen TID with methocarbamol for 14 days starting on Monday 12/22. Has history of stomach bleed associated with ibuprofen; however, with her surgical care team they wanted to try a 14 day course of ibuprofen to lessen swelling/inflammation.     Has a Medrol dose pack but has not started taking this yet.     Changes made to PTA medication list:  Added:   Gabapentin 100 mg  Methocarbamol 500 mg  Methylprednisolone 4 mg dose pack  Senna-docusate 8.6-50 mg  Ibuprofen 200 mg  Deleted:   Potassium chloride 20 mEq packet  Changed: None    Allergies reviewed with patient and updates made in EHR: yes    Medication History Completed By: Valentin Guerra 12/27/2024 5:45 PM    Current Facility-Administered Medications for the 12/27/24 encounter (Hospital Encounter)   Medication    denosumab (PROLIA) injection 60 mg     PTA Med List   Medication Sig Note Last Dose/Taking    acetaminophen (TYLENOL) 325 MG tablet Take 2 tablets (650 mg) by mouth every 4 hours as needed for other (For optimal non-opioid multimodal pain management to improve pain control.).  Past Week    amoxicillin (AMOXIL) 500 MG capsule Take 2,000 mg by mouth as needed (prior to dental procedures).  Unknown    CALCIUM CITRATE/VITAMIN D3 (CALCIUM CITRATE + D ORAL) Take 2 tablets by mouth 2 times daily.  12/27/2024 Morning    cholecalciferol, vitamin D3, (VITAMIN D3) 2,000 unit Tab Take 1 tablet by mouth every morning.  12/27/2024 Morning    citalopram (CELEXA) 20 MG tablet Take 20 mg by mouth every morning.   12/27/2024 Morning    furosemide (LASIX) 20 MG tablet Take 1 tablet (20 mg) by mouth daily.  12/27/2024 Morning    gabapentin (NEURONTIN) 100 MG capsule Take 100 mg by mouth 3 times daily.  12/27/2024 Morning    ibuprofen (ADVIL/MOTRIN) 200 MG tablet Take 400 mg by mouth 3 times daily. Take 400 mg TID with methocarbamol for 14 days then discontinue due to risk of peptic ulcer/bleed.  12/27/2024 Morning    methocarbamol (ROBAXIN) 500 MG tablet Take 500 mg by mouth 3 times daily.  12/27/2024 Morning    methylPREDNISolone (MEDROL DOSEPAK) 4 MG tablet therapy pack Take by mouth See Admin Instructions. Take as directed on the package. (6,5,4,3,2,1...) 12/27/2024: 12/27/24 - has not started yet Taking    metoprolol succinate ER (TOPROL XL) 25 MG 24 hr tablet Take 0.5 tablets (12.5 mg) by mouth at bedtime. TO BE STARTED ON 12/7 12/26/2024 Bedtime    multivitamin (MULTIVITAMIN) per tablet Take 1 tablet by mouth every morning.  12/27/2024 Morning    omeprazole (PRILOSEC) 20 MG capsule [OMEPRAZOLE (PRILOSEC) 20 MG CAPSULE] Take 20 mg by mouth daily before breakfast.  12/27/2024 Morning    oxyCODONE (ROXICODONE) 5 MG tablet Take 1 tablet (5 mg) by mouth every 4 hours as needed for moderate pain.  Past Week Bedtime    senna-docusate (SENOKOT-S/PERICOLACE) 8.6-50 MG tablet Take 1 tablet by mouth 2 times daily as needed for constipation.  12/26/2024 Morning

## 2024-12-28 LAB
ALBUMIN SERPL BCG-MCNC: 3.2 G/DL (ref 3.5–5.2)
ALP SERPL-CCNC: 78 U/L (ref 40–150)
ALT SERPL W P-5'-P-CCNC: 11 U/L (ref 0–50)
ANION GAP SERPL CALCULATED.3IONS-SCNC: 9 MMOL/L (ref 7–15)
AST SERPL W P-5'-P-CCNC: 18 U/L (ref 0–45)
ATRIAL RATE - MUSE: 95 BPM
BILIRUB SERPL-MCNC: <0.2 MG/DL
BUN SERPL-MCNC: 9.4 MG/DL (ref 8–23)
CALCIUM SERPL-MCNC: 9.2 MG/DL (ref 8.8–10.4)
CHLORIDE SERPL-SCNC: 103 MMOL/L (ref 98–107)
CREAT SERPL-MCNC: 0.85 MG/DL (ref 0.51–0.95)
DIASTOLIC BLOOD PRESSURE - MUSE: 53 MMHG
EGFRCR SERPLBLD CKD-EPI 2021: 69 ML/MIN/1.73M2
ERYTHROCYTE [DISTWIDTH] IN BLOOD BY AUTOMATED COUNT: 13.9 % (ref 10–15)
GLUCOSE SERPL-MCNC: 98 MG/DL (ref 70–99)
HCO3 SERPL-SCNC: 28 MMOL/L (ref 22–29)
HCT VFR BLD AUTO: 27.6 % (ref 35–47)
HGB BLD-MCNC: 8.8 G/DL (ref 11.7–15.7)
INR PPP: 1.04 (ref 0.85–1.15)
INTERPRETATION ECG - MUSE: NORMAL
MCH RBC QN AUTO: 29.4 PG (ref 26.5–33)
MCHC RBC AUTO-ENTMCNC: 31.9 G/DL (ref 31.5–36.5)
MCV RBC AUTO: 92 FL (ref 78–100)
P AXIS - MUSE: 85 DEGREES
PLATELET # BLD AUTO: 295 10E3/UL (ref 150–450)
POTASSIUM SERPL-SCNC: 4.1 MMOL/L (ref 3.4–5.3)
PR INTERVAL - MUSE: 124 MS
PROT SERPL-MCNC: 5.6 G/DL (ref 6.4–8.3)
QRS DURATION - MUSE: 76 MS
QT - MUSE: 336 MS
QTC - MUSE: 422 MS
R AXIS - MUSE: 79 DEGREES
RBC # BLD AUTO: 2.99 10E6/UL (ref 3.8–5.2)
RETICS # AUTO: 0.07 10E6/UL (ref 0.03–0.1)
RETICS/RBC NFR AUTO: 2.3 % (ref 0.5–2)
SODIUM SERPL-SCNC: 140 MMOL/L (ref 135–145)
SYSTOLIC BLOOD PRESSURE - MUSE: 88 MMHG
T AXIS - MUSE: 78 DEGREES
TRANSFERRIN SERPL-MCNC: 258 MG/DL (ref 200–360)
VENTRICULAR RATE- MUSE: 95 BPM
VIT B12 SERPL-MCNC: 806 PG/ML (ref 232–1245)
WBC # BLD AUTO: 4.1 10E3/UL (ref 4–11)

## 2024-12-28 PROCEDURE — 84155 ASSAY OF PROTEIN SERUM: CPT | Performed by: INTERNAL MEDICINE

## 2024-12-28 PROCEDURE — 250N000011 HC RX IP 250 OP 636: Performed by: INTERNAL MEDICINE

## 2024-12-28 PROCEDURE — 36415 COLL VENOUS BLD VENIPUNCTURE: CPT | Performed by: INTERNAL MEDICINE

## 2024-12-28 PROCEDURE — 82435 ASSAY OF BLOOD CHLORIDE: CPT | Performed by: INTERNAL MEDICINE

## 2024-12-28 PROCEDURE — 250N000013 HC RX MED GY IP 250 OP 250 PS 637: Performed by: INTERNAL MEDICINE

## 2024-12-28 PROCEDURE — 85018 HEMOGLOBIN: CPT | Performed by: INTERNAL MEDICINE

## 2024-12-28 PROCEDURE — 258N000003 HC RX IP 258 OP 636: Performed by: INTERNAL MEDICINE

## 2024-12-28 PROCEDURE — 120N000001 HC R&B MED SURG/OB

## 2024-12-28 PROCEDURE — 85045 AUTOMATED RETICULOCYTE COUNT: CPT | Performed by: INTERNAL MEDICINE

## 2024-12-28 PROCEDURE — 85610 PROTHROMBIN TIME: CPT | Performed by: INTERNAL MEDICINE

## 2024-12-28 PROCEDURE — 99232 SBSQ HOSP IP/OBS MODERATE 35: CPT | Performed by: INTERNAL MEDICINE

## 2024-12-28 RX ORDER — NALOXONE HYDROCHLORIDE 0.4 MG/ML
0.4 INJECTION, SOLUTION INTRAMUSCULAR; INTRAVENOUS; SUBCUTANEOUS
Status: DISCONTINUED | OUTPATIENT
Start: 2024-12-28 | End: 2025-01-06 | Stop reason: HOSPADM

## 2024-12-28 RX ORDER — FERROUS SULFATE 325(65) MG
325 TABLET ORAL EVERY OTHER DAY
Status: DISCONTINUED | OUTPATIENT
Start: 2024-12-28 | End: 2025-01-02

## 2024-12-28 RX ORDER — NALOXONE HYDROCHLORIDE 0.4 MG/ML
0.2 INJECTION, SOLUTION INTRAMUSCULAR; INTRAVENOUS; SUBCUTANEOUS
Status: DISCONTINUED | OUTPATIENT
Start: 2024-12-28 | End: 2025-01-06 | Stop reason: HOSPADM

## 2024-12-28 RX ORDER — HYDROMORPHONE HCL IN WATER/PF 6 MG/30 ML
.2-.4 PATIENT CONTROLLED ANALGESIA SYRINGE INTRAVENOUS
Status: DISCONTINUED | OUTPATIENT
Start: 2024-12-28 | End: 2024-12-30 | Stop reason: ALTCHOICE

## 2024-12-28 RX ADMIN — HYDROMORPHONE HYDROCHLORIDE 0.2 MG: 0.2 INJECTION, SOLUTION INTRAMUSCULAR; INTRAVENOUS; SUBCUTANEOUS at 21:12

## 2024-12-28 RX ADMIN — PROCHLORPERAZINE EDISYLATE 5 MG: 5 INJECTION INTRAMUSCULAR; INTRAVENOUS at 10:58

## 2024-12-28 RX ADMIN — OXYCODONE 5 MG: 5 TABLET ORAL at 00:24

## 2024-12-28 RX ADMIN — ONDANSETRON 4 MG: 2 INJECTION INTRAMUSCULAR; INTRAVENOUS at 14:48

## 2024-12-28 RX ADMIN — GABAPENTIN 100 MG: 100 CAPSULE ORAL at 14:04

## 2024-12-28 RX ADMIN — SODIUM CHLORIDE: 9 INJECTION, SOLUTION INTRAVENOUS at 10:41

## 2024-12-28 RX ADMIN — METOPROLOL SUCCINATE ER TABLETS 12.5 MG: 25 TABLET, FILM COATED, EXTENDED RELEASE ORAL at 22:07

## 2024-12-28 RX ADMIN — OXYCODONE 5 MG: 5 TABLET ORAL at 17:51

## 2024-12-28 RX ADMIN — METHOCARBAMOL 500 MG: 500 TABLET ORAL at 09:32

## 2024-12-28 RX ADMIN — HYDROMORPHONE HYDROCHLORIDE 0.4 MG: 0.2 INJECTION, SOLUTION INTRAMUSCULAR; INTRAVENOUS; SUBCUTANEOUS at 14:48

## 2024-12-28 RX ADMIN — ACETAMINOPHINE, CAFFEINE 2 TABLET: 500; 65 TABLET, FILM COATED ORAL at 01:23

## 2024-12-28 RX ADMIN — OXYCODONE 5 MG: 5 TABLET ORAL at 08:10

## 2024-12-28 RX ADMIN — SODIUM CHLORIDE: 9 INJECTION, SOLUTION INTRAVENOUS at 00:24

## 2024-12-28 RX ADMIN — HYDROMORPHONE HYDROCHLORIDE 0.2 MG: 0.2 INJECTION, SOLUTION INTRAMUSCULAR; INTRAVENOUS; SUBCUTANEOUS at 10:55

## 2024-12-28 RX ADMIN — METHOCARBAMOL 500 MG: 500 TABLET ORAL at 14:04

## 2024-12-28 RX ADMIN — ONDANSETRON 4 MG: 4 TABLET, ORALLY DISINTEGRATING ORAL at 00:24

## 2024-12-28 RX ADMIN — PROCHLORPERAZINE EDISYLATE 5 MG: 5 INJECTION INTRAMUSCULAR; INTRAVENOUS at 17:51

## 2024-12-28 RX ADMIN — ONDANSETRON 4 MG: 2 INJECTION INTRAMUSCULAR; INTRAVENOUS at 21:04

## 2024-12-28 RX ADMIN — ONDANSETRON 4 MG: 4 TABLET, ORALLY DISINTEGRATING ORAL at 08:10

## 2024-12-28 RX ADMIN — GABAPENTIN 100 MG: 100 CAPSULE ORAL at 22:07

## 2024-12-28 ASSESSMENT — ACTIVITIES OF DAILY LIVING (ADL)
ADLS_ACUITY_SCORE: 62

## 2024-12-28 NOTE — H&P
[facility]    History and Physical - Hospitalist Service       Date of Admission:  12/27/2024    Assessment & Plan      Pam Chino is a 79 year old female who underwent L4-5 decompression surgery for chronic severe spinal stenosis on 12/2/2024 with complication of dural tear, admitted on 12/27/2024 for drainage from surgical wound and headache.  [facility] to evaluate in AM.    Status post L4-L5 decompression surgery  Neural tear, intraoperative  Postoperative fluid collection  Suspect CSF fluid collection  Order bedrest with bedside commode  Order acetaminophen-caffeine 500-65 mg 2 tablets every 6 hours as needed for headache  Oxycodone 5 mg every 4 hours as needed for severe headache  [facility] consultation for a.m.    Depression and anxiety  PTA medication: Celexa home dose    Essential hypertension  PTA medication: Furosemide 20 mg daily, metoprolol 12.5 mg at bedtime  Normocytic anemia  Likely postoperative anemia  Order iron studies and vitamin B12 folate level.  Repeat CBC in a.m.  No indication for transfusion at this time.    GERD   PTA medication: Omeprazole 20 mg daily       Diet: NPO per Anesthesia Guidelines for Procedure/Surgery Except for: Meds, Ice Chips  DVT Prophylaxis: Pneumatic Compression Devices  Mejia Catheter: Not present  Lines: None     Cardiac Monitoring: None  Code Status: Full Code    Clinically Significant Risk Factors Present on Admission                   # Hypertension: Noted on problem list      # Anemia: based on hgb <11                  Disposition Plan     Medically Ready for Discharge: Anticipated in 2-4 Days           Bernard Ortiz DO  Hospitalist Service  [facility]  Securely message with [employer] (more info)  Text page via [employer] Paging/Directory     ______________________________________________________________________    Chief Complaint   Headache, drainage from surgical wound    History is  obtained from the patient    History of Present Illness   Pam Chino is a 79 year old female with history of lumbar decompression surgery on December 2, 2024 with complication of dural tear who presents with headache and drainage from surgical wound on 12/27/2024.  Patient was visiting her primary care provider when she noticed a sensation of feeling wet over her lower back.  She was noted to have clear drainage on her surgical wound bandage.  Patient denied symptoms of fever or chills.  No chest pain or shortness of breath.  After she was noted to have drainage from her wound she had sensation of her heart pounding and felt anxious.  She denies orthopnea or PND.  She had generalized headache which is worse with sitting up or moving.  For this reason she presented to the emergency room.    In the emergency department patient's vital signs were remarkable for elevated systolic hypertension with maximum blood pressure at 151 mmHg.  Laboratory studies remarkable for CRP of 7.3.  Troponin T was 17 with repeat of 15.  CBC with hemoglobin of 9.5 platelets 354.  CT chest PE protocol showed no pulmonary embolism.  There were areas of air trapping and groundglass opacity and bronchial wall thickening consistent with central airway inflammation.  CT head showed no evidence of acute intracranial process.  MR lumbar spine showed large fluid collection extending along the posterior margin of the hardware extending from the dural margin to the skin surface.  Radiologist felt this was likely consistent with large CSF collection but superinfection could not be excluded.  There was a new T1 shortening and T2 prolongation along the dorsal margin of the lumbar vertebral bodies which demonstrates relative uniform enhancement and favored to reflect venous dilatation and may relate to decreased CSF pressure hypotension.  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  Results of MRI were discussed  with Dr. Winchester from [facility] by Dr. Stein and Dr. Oliver.  Recommended against initiation of antibiotics as unlikely infection.  Will see patient in AM.      Past Medical History    Past Medical History:   Diagnosis Date    Anxiety     Basal cell carcinoma     Chest pain 12/22/2011    Depression     Gastritis     GERD (gastroesophageal reflux disease)     Nodular basal cell carcinoma 07/17/2014    Left distal calf     Osteoporosis     Patella fracture 02/04/2006    Scoliosis        Past Surgical History   Past Surgical History:   Procedure Laterality Date    COLONOSCOPY      FUSION TRANSFORAMINAL LUMBAR INTERBODY, 1 LEVEL, POSTERIOR APPROACH Right 12/2/2024    Procedure: RIGHT LUMBAR 4-5 TRANSFORAMINAL LUMBAR INTERBODY FUSION;  Surgeon: Vidal Winchester MD;  Location: [facility]    MOHS MICROGRAPHIC PROCEDURE Left 2014    BCE distal calf    OTHER SURGICAL HISTORY  03/13/1997    laser cone biopsy    OTHER SURGICAL HISTORY  Oct. 2015    nose bxCA    SKIN CANCER EXCISION      maru. leg- in office    UPPER GASTROINTESTINAL ENDOSCOPY      ZZC TOTAL KNEE ARTHROPLASTY Right 11/11/2015    Procedure: RIGHT KNEE TOTAL ARTHROPLASTY;  Surgeon: Benny Lopez MD;  [facility];  Service: Orthopedics       Prior to Admission Medications   Prior to Admission Medications   Prescriptions Last Dose Informant Patient Reported? Taking?   CALCIUM CITRATE/VITAMIN D3 (CALCIUM CITRATE + D ORAL) 12/27/2024 Morning  Yes Yes   Sig: Take 2 tablets by mouth 2 times daily.   acetaminophen (TYLENOL) 325 MG tablet Past Week  No Yes   Sig: Take 2 tablets (650 mg) by mouth every 4 hours as needed for other (For optimal non-opioid multimodal pain management to improve pain control.).   amoxicillin (AMOXIL) 500 MG capsule Unknown  Yes Yes   Sig: Take 2,000 mg by mouth as needed (prior to dental procedures).   cholecalciferol, vitamin D3, (VITAMIN D3) 2,000 unit Tab 12/27/2024 Morning  Yes Yes   Sig: Take 1 tablet by  mouth every morning.   citalopram (CELEXA) 20 MG tablet 12/27/2024 Morning  Yes Yes   Sig: Take 20 mg by mouth every morning.   furosemide (LASIX) 20 MG tablet 12/27/2024 Morning  No Yes   Sig: Take 1 tablet (20 mg) by mouth daily.   gabapentin (NEURONTIN) 100 MG capsule 12/27/2024 Morning  Yes Yes   Sig: Take 100 mg by mouth 3 times daily.   ibuprofen (ADVIL/MOTRIN) 200 MG tablet 12/27/2024 Morning  Yes Yes   Sig: Take 400 mg by mouth 3 times daily. Take 400 mg TID with methocarbamol for 14 days then discontinue due to risk of peptic ulcer/bleed.   methocarbamol (ROBAXIN) 500 MG tablet 12/27/2024 Morning  Yes Yes   Sig: Take 500 mg by mouth 3 times daily.   methylPREDNISolone (MEDROL DOSEPAK) 4 MG tablet therapy pack   Yes Yes   Sig: Take by mouth See Admin Instructions. Take as directed on the package. (6,5,4,3,2,1...)   metoprolol succinate ER (TOPROL XL) 25 MG 24 hr tablet 12/26/2024 Bedtime  No Yes   Sig: Take 0.5 tablets (12.5 mg) by mouth at bedtime. TO BE STARTED ON 12/7   multivitamin (MULTIVITAMIN) per tablet 12/27/2024 Morning  Yes Yes   Sig: Take 1 tablet by mouth every morning.   omeprazole (PRILOSEC) 20 MG capsule 12/27/2024 Morning  Yes Yes   Sig: [OMEPRAZOLE (PRILOSEC) 20 MG CAPSULE] Take 20 mg by mouth daily before breakfast.   oxyCODONE (ROXICODONE) 5 MG tablet Past Week Bedtime  No Yes   Sig: Take 1 tablet (5 mg) by mouth every 4 hours as needed for moderate pain.   senna-docusate (SENOKOT-S/PERICOLACE) 8.6-50 MG tablet 12/26/2024 Morning  Yes Yes   Sig: Take 1 tablet by mouth 2 times daily as needed for constipation.      Facility-Administered Medications Last Administration Doses Remaining   denosumab (PROLIA) injection 60 mg None recorded 2           Review of Systems    The 10 point Review of Systems is negative other than noted in the HPI or here.     Social History   I have reviewed this patient's social history and updated it with pertinent information if needed.  Social History     Tobacco  "Use    Smoking status: Former     Current packs/day: 0.00     Types: Cigarettes     Start date: 11/10/1975     Quit date: 11/10/1980     Years since quittin.1     Passive exposure: Past    Smokeless tobacco: Never    Tobacco comments:     \"socially\"   Vaping Use    Vaping status: Never Used   Substance Use Topics    Alcohol use: Yes     Comment: Alcoholic Drinks/day: social    Drug use: No         Family History   I have reviewed this patient's family history and updated it with pertinent information if needed.  Family History   Problem Relation Age of Onset    Chronic Obstructive Pulmonary Disease Father          Allergies   Allergies   Allergen Reactions    Ibuprofen Other (See Comments)     Stomach Bleed     Other Environmental Allergy Other (See Comments)     Bandaids cause redness        Physical Exam   Vital Signs: Temp: 97.9  F (36.6  C) Temp src: Oral BP: (!) 151/72 Pulse: 96   Resp: 19 SpO2: 97 % O2 Device: None (Room air)    Weight: 138 lbs 0 oz    General: Appears in no acute distress, awake, alert, interactive.    Eyes: Conjunctivae non-injected. Sclera anicteric.  HENT: Atraumatic.  Neck: Supple.  Respiratory/Chest: Respiration unlabored.  Abdomen: non distended  Musculoskeletal: Normal extremities. No edema or erythema.  Skin: Normal color. No rash or diaphoresis. Soft tissue swelling around low back lumbar incision.  No erythema.  No obvious wound dehiscenc.    Neurologic: Face symmetric, moves all extremities spontaneously. Speech clear.  Psychiatric: Oriented to person, place, and time. Affect appropriate.    Medical Decision Making       80 MINUTES SPENT BY ME on the date of service doing chart review, history, exam, documentation & further activities per the note.      Data     I have personally reviewed the following data over the past 24 hrs:    7.1  \   9.5 (L)   / 354     138 99 13.0 /  130 (H)   3.6 26 0.82 \     Trop: 15 (H) BNP: 1,197     Procal: N/A CRP: 7.32 (H) Lactic Acid: N/A      "    Imaging results reviewed over the past 24 hrs:   Recent Results (from the past 24 hours)   MR Lumbar Spine w/o & w Contrast    Narrative    EXAM: MR LUMBAR SPINE W/O and W CONTRAST  LOCATION: [facility]  DATE: 12/27/2024    INDICATION: Dural leak, back surgery 1 month ago here with wound leak.  COMPARISON: MRI lumbar spine 9/19/2024.  CONTRAST: 6.2 mL Gadavist  TECHNIQUE: Routine Lumbar Spine MRI without and with IV contrast.    FINDINGS: Nomenclature is based on 5 lumbar type vertebral bodies. Moderate convex right lumbar rotary curvature measuring 33 degrees from the superior L3 to the inferior L5 endplate. Slight rightward subluxation L3 on L4. Mild accentuation of the normal   lumbar lordosis. Stable grade 1 degenerative anterolisthesis of L5 on S1.    Patient is status post interval L4-5 laminectomy and interbody and posterior instrumented fusion at this level. There is a large fluid collection surrounding the posterior aspect of the hardware and extending from the dural margin to the skin surface.   The collection measures up to 7.4 x 8.7 x 7.9 cm in size (CC, TRV, AP). The collection bows the dura anteriorly at the L4-5 level suggesting that the collection is under pressure. There is enhancement along the periphery of the collection which is   nonspecific and although compatible with post surgical change, superinfection cannot be excluded on the basis of this exam.    There is new T2 prolongation and T1 prolongation along the dorsal aspect of the lumbar vertebral bodies which demonstrates relatively uniform enhancement on the post gadolinium images and is favored to reflect venous dilatation. Epidural collections are   considered less likely.    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 Linear enhancement along the clumped nerve roots is favored to be vascular in etiology, although a component of arachnoiditis cannot be  excluded.    Although the L4-5 disc is not well seen secondary to susceptibility effects, no abnormal fluid signal or enhancement is seen throughout the remaining lumbar or thoracic discs to strongly suggest discitis/osteomyelitis. No abnormal marrow enhancement is   appreciated (allowing for susceptibility effects at L4 and L5).    Psoas muscles are normal in appearance. Visualized aorta is normal in caliber. Fluid fluid level within the gallbladder compatible with layering stones or milk of calcium.    Lower Thoracic Levels: Advanced interspace narrowing T10-T11 and T11-T12. Loss of normal disc T2 prolongation and shallow posterior disc herniations are present at these levels. No spinal canal or neural foraminal stenosis.    T12-L1: Grade 1 retrolisthesis T12 on L1. Endplate irregularity and mixed Modic type I and Modic type II changes. Loss of disc T2 prolongation and shallow posterior disc bulge. No facet arthropathy. No spinal canal stenosis. No right foraminal stenosis.   No left foraminal stenosis.    L1-L2: Grade 1 retrolisthesis L1 on L2. Advanced interspace narrowing. Loss of normal disc T2 prolongation. Shallow posterior disc bulge. Moderate left and mild right-sided facet arthropathy. No spinal canal stenosis. Moderate left foraminal stenosis. No   right foraminal stenosis.    L2-L3: Grade 1 retrolisthesis L2 on L3. Moderate interspace narrowing. Loss of normal disc T2 prolongation. Posterior interbody spurring and shallow posterior disc bulge. Mild facet arthropathy. No spinal canal or neural foraminal stenosis.    L3-L4: Grade 1 retrolisthesis L3 on L4. Advanced interspace narrowing. Loss of normal disc T2 prolongation. Posterior interbody ridging and shallow posterior disc bulge. At least mild facet arthropathy. No spinal canal stenosis. No neural foraminal   stenosis.    L4-L5: Interbody and posterior instrumented fusion. Normal disc height. Shallow posterior disc bulge. Spinal canal and right  lateral recess have been decompressed dorsally. No bony canal stenosis. Dorsal epidural collection eccentric to the right exerts   mass effect upon the posterior margin of the exiting right L4 nerve root (sagittal image 11). No left foraminal stenosis.    L5-S1: Grade 1 anterolisthesis of L5 on S1. Mild interspace narrowing. Loss of normal disc T2 prolongation. Shallow posterior disc bulge. Mild left and more moderate right-sided facet arthropathy. Shallow left-sided facet effusion. No spinal canal   stenosis. No left foraminal stenosis. Apparent mild right-sided foraminal stenosis likely exaggerated by susceptibility effects from hardware.      Impression    IMPRESSION:  1.  Interval laminectomy and partial facetectomy on the right at L4-5 and interval interbody and posterior instrumented fusion at this level.    2.  Large fluid collection extends along the posterior margin of the hardware extending from the dural margin to the skin surface. Ventral aspect of the collection bows the dura anteriorly at the L4-5 level suggesting that the collection is under   pressure. By report, the patient has a history of a CSF leak, and this could reflect a large CSF collection. Superinfection cannot be excluded on the basis of this exam.    3.  There is new T1 shortening and T2 prolongation along the dorsal margin of the lumbar vertebral bodies which demonstrates relatively uniform enhancement and is favored to reflect venous dilatation and may relate to decreased CSF pressure/hypotension.   Epidural collections are considered less likely, but cannot be excluded.    4.  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    5.  Advanced lumbar spondylosis with moderate convex right lumbar rotary curvature and multilevel listheses as described.    6.  Layering stones or milk of calcium is seen within the gallbladder, new compared to the 9/19/2024 prior.    Large collection with mass effect upon the  dura, apparent venous dilatation, and arachnoiditis discussed with Dr. Oliver at 1623 hours.   CT Chest Pulmonary Embolism w Contrast    Narrative    EXAM: CT CHEST PULMONARY EMBOLISM W CONTRAST  LOCATION: [facility]  DATE: 12/27/2024    INDICATION: chest pain, recent surgery  COMPARISON: None.  TECHNIQUE: CT chest pulmonary angiogram during arterial phase injection of IV contrast. Multiplanar reformats and MIP reconstructions were performed. Dose reduction techniques were used.   CONTRAST: Isovue 370 75mL    FINDINGS:  ANGIOGRAM CHEST: Pulmonary arteries are normal caliber and negative for pulmonary emboli. Although contrast bolus is somewhat limited, no evidence of thoracic aortic dissection. Fusiform dilatation of the ascending aorta reaching 3.7 cm. Normal caliber   descending aorta. No CT evidence of right heart strain.    LUNGS AND PLEURA: Interval resolution of pleural effusions. Mild intralobular septal thickening with scattered areas of air trapping, and groundglass opacity, and bronchial wall thickening.    MEDIASTINUM/AXILLAE: No significant mediastinal or hilar adenopathy.    CORONARY ARTERY CALCIFICATION: Moderate.    UPPER ABDOMEN: No acute abnormalities    MUSCULOSKELETAL: Thoracolumbar scoliosis and moderate lumbar spondylosis. New moderate elevation of the left hemidiaphragm.      Impression    IMPRESSION:  1.  No evidence of pulmonary embolus.  2.  Areas of air trapping, groundglass opacity and bronchial wall thickening compatible with central airway inflammation.   CT Head w/o Contrast    Narrative    EXAM: CT HEAD W/O CONTRAST  LOCATION: [facility]  DATE: 12/27/2024    INDICATION: Headache, postional, vomiting  COMPARISON: None.  TECHNIQUE: Routine CT Head without IV contrast. Multiplanar reformats. Dose reduction techniques were used.    FINDINGS:  INTRACRANIAL CONTENTS: No intracranial hemorrhage, extraaxial collection, or mass effect.  No CT  evidence of acute infarct. Mild presumed chronic small vessel ischemic changes. Normal ventricles and sulci.     VISUALIZED ORBITS/SINUSES/MASTOIDS: No intraorbital abnormality. No paranasal sinus mucosal disease. No middle ear or mastoid effusion.    BONES/SOFT TISSUES: No acute abnormality.      Impression    IMPRESSION:  1.  No CT evidence for acute intracranial process.  2.  Mild chronic microvascular ischemic changes as above.

## 2024-12-28 NOTE — PLAN OF CARE
A&Ox4, VSS on RA. Positive for norovirus, enteric precautions initiated. Soft BM x3 today. Incontinent of bowel and bladder. NS infusing 100mL/hr. Clear liquid diet (ADAT). Pt feeling very weak, unable to get out of bed. Discharge plan pending clinical improvement,    Problem: Adult Inpatient Plan of Care  Goal: Optimal Comfort and Wellbeing  Outcome: Progressing     Problem: Nausea and Vomiting  Goal: Nausea and Vomiting Relief  Outcome: Progressing   Goal Outcome Evaluation:

## 2024-12-28 NOTE — PLAN OF CARE
Problem: Adult Inpatient Plan of Care  Goal: Absence of Hospital-Acquired Illness or Injury  Intervention: Identify and Manage Fall Risk  Recent Flowsheet Documentation  Taken 12/28/2024 1700 by Gabi Cohen RN  Safety Promotion/Fall Prevention: safety round/check completed  Taken 12/28/2024 1520 by Gabi Cohen RN  Safety Promotion/Fall Prevention:   activity supervised   assistive device/personal items within reach   clutter free environment maintained   lighting adjusted   nonskid shoes/slippers when out of bed   room near nurse's station   safety round/check completed     Problem: Adult Inpatient Plan of Care  Goal: Optimal Comfort and Wellbeing  Outcome: Progressing     Patient vital signs are at baseline: Yes  Patient able to ambulate as they were prior to admission or with assist devices provided by therapies during their stay:  Yes, but nauseous  Patient MUST void prior to discharge:  Yes  Patient able to tolerate oral intake:  Yes  Pain has adequate pain control using Oral analgesics:  No,  Reason:  IV medications required  Does patient have an identified :  Yes  Has goal D/C date and time been discussed with patient:  Yes    Patient A&O, VSS, and CMS intact. Painful, but managing with medications. Dressing CDI, ABD. Lying in bed. Gets nauseous while up and moving, but no headache. Plan is for surgery on Monday. Call light appropriate and all alarms in place.  Gabi Cohen RN

## 2024-12-28 NOTE — PROGRESS NOTES
[facility]    Medicine Progress Note - Hospitalist Service    Date of Admission:  12/27/2024    Assessment & Plan    Pam Chino is a 79 year old female who underwent L4-5 decompression surgery for chronic severe spinal stenosis on 12/2/2024 with complication of dural tear, admitted on 12/27/2024 for drainage from surgical wound and headache.  Dr. Winchester with [facility] plans for surgery on Monday.      Status post L4-L5 decompression surgery  Neural tear, intraoperative  Postoperative fluid collection  Suspect CSF fluid collection from dural tear.   - bedrest with bedside commode  - continue acetaminophen-caffeine 500-65 mg 2 tablets every 6 hours as needed for headache  - continue oxycodone 5 mg every 4 hours as needed for severe headache  - dilaudid 0.2-0.4 mg IV q2h prn.  - NPO on Sunday evening.   Appreciate [facility] consultation     Depression and anxiety  PTA medication: Celexa home dose     Essential hypertension  PTA medication: Furosemide 20 mg daily, metoprolol 12.5 mg at bedtime    Normocytic anemia  Possible iron deficiency anemia.   Reticulocyte level pending.   Low iron  and low normal ferritin at 36. Vitamin B12 level normal.   Likely iron deficiency anemia.   Hemoglobin down to 8.8 g/dL.  - No indication for transfusion at this time.  - start iron sulfate 325 mg po every other day.   - monitor anemia per PCP, consider age appropriate screening.      GERD  PTA medication: Omeprazole 20 mg daily          Diet: NPO per Anesthesia Guidelines for Procedure/Surgery Except for: Meds, Ice Chips    DVT Prophylaxis: Pneumatic Compression Devices  Mejia Catheter: Not present  Lines: None     Cardiac Monitoring: None  Code Status: Full Code      Clinically Significant Risk Factors Present on Admission               # Hypoalbuminemia: Lowest albumin = 3.2 g/dL at 12/28/2024  6:56 AM, will monitor as appropriate     # Hypertension: Noted on problem list      #  Anemia: based on hgb <11                  Social Drivers of Health    Housing Stability: High Risk (12/2/2024)    Housing Stability     Do you have housing? : No     Are you worried about losing your housing?: No   Tobacco Use: Medium Risk (12/2/2024)    Patient History     Smoking Tobacco Use: Former     Smokeless Tobacco Use: Never     Passive Exposure: Past          Disposition Plan     Medically Ready for Discharge: Anticipated in 2-4 Days             Bernard Ortiz DO  Hospitalist Service  [facility]  Securely message with [employer] (more info)  Text page via [employer] Paging/Directory   ______________________________________________________________________    Interval History   Further drainage of clear fluid noted this morning.  Patient complained of nausea this morning and low back pain.  Denies fever.     Physical Exam   Vital Signs: Temp: 98.4  F (36.9  C) Temp src: Oral BP: 109/51 Pulse: 62   Resp: 16 SpO2: 96 % O2 Device: None (Room air)    Weight: 138 lbs 0 oz    General: Appears in no acute distress, awake, alert, interactive.    Eyes: Conjunctivae non-injected. Sclera anicteric.  HENT: Atraumatic.  Neck: Supple.  Respiratory/Chest: Respiration unlabored.  Abdomen: non distended  Musculoskeletal: Normal extremities. No edema or erythema.  Skin: Normal color. No rash or diaphoresis. Soft tissue swelling around low back lumbar incision.  No erythema.    Neurologic: Face symmetric, moves all extremities spontaneously. Speech clear.  Psychiatric: Oriented to person, place, and time. Affect appropriate.    Medical Decision Making       40 MINUTES SPENT BY ME on the date of service doing chart review, history, exam, documentation & further activities per the note.      Data     I have personally reviewed the following data over the past 24 hrs:    4.1  \   8.8 (L)   / 295     140 103 9.4 /  98   4.1 28 0.85 \     ALT: 11 AST: 18 AP: 78 TBILI: <0.2   ALB: 3.2 (L) TOT PROTEIN: 5.6 (L)  LIPASE: N/A     Trop: 15 (H) BNP: 1,197     Procal: N/A CRP: 7.32 (H) Lactic Acid: N/A       INR:  1.04 PTT:  N/A   D-dimer:  N/A Fibrinogen:  N/A     Ferritin:  36 % Retic:  N/A LDH:  N/A       Imaging results reviewed over the past 24 hrs:   Recent Results (from the past 24 hours)   MR Lumbar Spine w/o & w Contrast    Narrative    EXAM: MR LUMBAR SPINE W/O and W CONTRAST  LOCATION: [facility]  DATE: 12/27/2024    INDICATION: Dural leak, back surgery 1 month ago here with wound leak.  COMPARISON: MRI lumbar spine 9/19/2024.  CONTRAST: 6.2 mL Gadavist  TECHNIQUE: Routine Lumbar Spine MRI without and with IV contrast.    FINDINGS: Nomenclature is based on 5 lumbar type vertebral bodies. Moderate convex right lumbar rotary curvature measuring 33 degrees from the superior L3 to the inferior L5 endplate. Slight rightward subluxation L3 on L4. Mild accentuation of the normal   lumbar lordosis. Stable grade 1 degenerative anterolisthesis of L5 on S1.    Patient is status post interval L4-5 laminectomy and interbody and posterior instrumented fusion at this level. There is a large fluid collection surrounding the posterior aspect of the hardware and extending from the dural margin to the skin surface.   The collection measures up to 7.4 x 8.7 x 7.9 cm in size (CC, TRV, AP). The collection bows the dura anteriorly at the L4-5 level suggesting that the collection is under pressure. There is enhancement along the periphery of the collection which is   nonspecific and although compatible with post surgical change, superinfection cannot be excluded on the basis of this exam.    There is new T2 prolongation and T1 prolongation along the dorsal aspect of the lumbar vertebral bodies which demonstrates relatively uniform enhancement on the post gadolinium images and is favored to reflect venous dilatation. Epidural collections are   considered less likely.    There is new clumping of the nerve roots  throughout the lumbar spine compatible with arachnoiditis. Linear enhancement along the clumped nerve roots is favored to be vascular in etiology, although a component of arachnoiditis cannot be excluded.    Although the L4-5 disc is not well seen secondary to susceptibility effects, no abnormal fluid signal or enhancement is seen throughout the remaining lumbar or thoracic discs to strongly suggest discitis/osteomyelitis. No abnormal marrow enhancement is   appreciated (allowing for susceptibility effects at L4 and L5).    Psoas muscles are normal in appearance. Visualized aorta is normal in caliber. Fluid fluid level within the gallbladder compatible with layering stones or milk of calcium.    Lower Thoracic Levels: Advanced interspace narrowing T10-T11 and T11-T12. Loss of normal disc T2 prolongation and shallow posterior disc herniations are present at these levels. No spinal canal or neural foraminal stenosis.    T12-L1: Grade 1 retrolisthesis T12 on L1. Endplate irregularity and mixed Modic type I and Modic type II changes. Loss of disc T2 prolongation and shallow posterior disc bulge. No facet arthropathy. No spinal canal stenosis. No right foraminal stenosis.   No left foraminal stenosis.    L1-L2: Grade 1 retrolisthesis L1 on L2. Advanced interspace narrowing. Loss of normal disc T2 prolongation. Shallow posterior disc bulge. Moderate left and mild right-sided facet arthropathy. No spinal canal stenosis. Moderate left foraminal stenosis. No   right foraminal stenosis.    L2-L3: Grade 1 retrolisthesis L2 on L3. Moderate interspace narrowing. Loss of normal disc T2 prolongation. Posterior interbody spurring and shallow posterior disc bulge. Mild facet arthropathy. No spinal canal or neural foraminal stenosis.    L3-L4: Grade 1 retrolisthesis L3 on L4. Advanced interspace narrowing. Loss of normal disc T2 prolongation. Posterior interbody ridging and shallow posterior disc bulge. At least mild facet  arthropathy. No spinal canal stenosis. No neural foraminal   stenosis.    L4-L5: Interbody and posterior instrumented fusion. Normal disc height. Shallow posterior disc bulge. Spinal canal and right lateral recess have been decompressed dorsally. No bony canal stenosis. Dorsal epidural collection eccentric to the right exerts   mass effect upon the posterior margin of the exiting right L4 nerve root (sagittal image 11). No left foraminal stenosis.    L5-S1: Grade 1 anterolisthesis of L5 on S1. Mild interspace narrowing. Loss of normal disc T2 prolongation. Shallow posterior disc bulge. Mild left and more moderate right-sided facet arthropathy. Shallow left-sided facet effusion. No spinal canal   stenosis. No left foraminal stenosis. Apparent mild right-sided foraminal stenosis likely exaggerated by susceptibility effects from hardware.      Impression    IMPRESSION:  1.  Interval laminectomy and partial facetectomy on the right at L4-5 and interval interbody and posterior instrumented fusion at this level.    2.  Large fluid collection extends along the posterior margin of the hardware extending from the dural margin to the skin surface. Ventral aspect of the collection bows the dura anteriorly at the L4-5 level suggesting that the collection is under   pressure. By report, the patient has a history of a CSF leak, and this could reflect a large CSF collection. Superinfection cannot be excluded on the basis of this exam.    3.  There is new T1 shortening and T2 prolongation along the dorsal margin of the lumbar vertebral bodies which demonstrates relatively uniform enhancement and is favored to reflect venous dilatation and may relate to decreased CSF pressure/hypotension.   Epidural collections are considered less likely, but cannot be excluded.    4.  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    5.  Advanced lumbar spondylosis with moderate convex right lumbar rotary curvature  and multilevel listheses as described.    6.  Layering stones or milk of calcium is seen within the gallbladder, new compared to the 9/19/2024 prior.    Large collection with mass effect upon the dura, apparent venous dilatation, and arachnoiditis discussed with Dr. Oliver at 1623 hours.   CT Chest Pulmonary Embolism w Contrast    Narrative    EXAM: CT CHEST PULMONARY EMBOLISM W CONTRAST  LOCATION: [facility]  DATE: 12/27/2024    INDICATION: chest pain, recent surgery  COMPARISON: None.  TECHNIQUE: CT chest pulmonary angiogram during arterial phase injection of IV contrast. Multiplanar reformats and MIP reconstructions were performed. Dose reduction techniques were used.   CONTRAST: Isovue 370 75mL    FINDINGS:  ANGIOGRAM CHEST: Pulmonary arteries are normal caliber and negative for pulmonary emboli. Although contrast bolus is somewhat limited, no evidence of thoracic aortic dissection. Fusiform dilatation of the ascending aorta reaching 3.7 cm. Normal caliber   descending aorta. No CT evidence of right heart strain.    LUNGS AND PLEURA: Interval resolution of pleural effusions. Mild intralobular septal thickening with scattered areas of air trapping, and groundglass opacity, and bronchial wall thickening.    MEDIASTINUM/AXILLAE: No significant mediastinal or hilar adenopathy.    CORONARY ARTERY CALCIFICATION: Moderate.    UPPER ABDOMEN: No acute abnormalities    MUSCULOSKELETAL: Thoracolumbar scoliosis and moderate lumbar spondylosis. New moderate elevation of the left hemidiaphragm.      Impression    IMPRESSION:  1.  No evidence of pulmonary embolus.  2.  Areas of air trapping, groundglass opacity and bronchial wall thickening compatible with central airway inflammation.   CT Head w/o Contrast    Narrative    EXAM: CT HEAD W/O CONTRAST  LOCATION: [facility]  DATE: 12/27/2024    INDICATION: Headache, postional, vomiting  COMPARISON: None.  TECHNIQUE: Routine CT  Head without IV contrast. Multiplanar reformats. Dose reduction techniques were used.    FINDINGS:  INTRACRANIAL CONTENTS: No intracranial hemorrhage, extraaxial collection, or mass effect.  No CT evidence of acute infarct. Mild presumed chronic small vessel ischemic changes. Normal ventricles and sulci.     VISUALIZED ORBITS/SINUSES/MASTOIDS: No intraorbital abnormality. No paranasal sinus mucosal disease. No middle ear or mastoid effusion.    BONES/SOFT TISSUES: No acute abnormality.      Impression    IMPRESSION:  1.  No CT evidence for acute intracranial process.  2.  Mild chronic microvascular ischemic changes as above.

## 2024-12-28 NOTE — PLAN OF CARE
Pt A&O x4. VSS on RA. Slight pain in back, declines PRNs at this time. Reports improvement of headache. Incision on back red but no drainage. Covered with ABD pad and tape. Bedrest with bedside commode. PIV patent and SL. NPO at midnight.     Problem: Adult Inpatient Plan of Care  Goal: Absence of Hospital-Acquired Illness or Injury  Outcome: Progressing     Problem: Adult Inpatient Plan of Care  Goal: Optimal Comfort and Wellbeing  Outcome: Progressing   Goal Outcome Evaluation:

## 2024-12-28 NOTE — PLAN OF CARE
Pt is alert and oriented x4. VSS on RA. Reporting pain in back and hip, PRN 5 mg oxy given. Also reports headache and nausea, PRN exedrin and zofran given. PIV infusing NS at 100ml/hr. Back incision covered with ABD pad, redness present, no drainage noted. Using bedside commode SBA, otherwise on bedrest. NPO since midnight. Uses call light appropriately.     Problem: Pain Acute  Goal: Optimal Pain Control and Function  Outcome: Progressing  Intervention: Develop Pain Management Plan  Recent Flowsheet Documentation  Taken 12/28/2024 0024 by Alexandria Schaefer, RN  Pain Management Interventions: medication (see MAR)  Intervention: Prevent or Manage Pain  Recent Flowsheet Documentation  Taken 12/28/2024 0024 by Alexandria Schaefer, RN  Medication Review/Management: medications reviewed     Problem: Nausea and Vomiting  Goal: Nausea and Vomiting Relief  Outcome: Progressing  Intervention: Prevent and Manage Nausea and Vomiting  Recent Flowsheet Documentation  Taken 12/28/2024 0024 by Alexandria Schaefer, RN  Nausea/Vomiting Interventions: antiemetic

## 2024-12-28 NOTE — PLAN OF CARE
A&Ox4, VSS on RA. CSF still leaking from incision site on lower back. ABD pad placed and changed when saturated. Pt reports pain and nausea, PRNs given and effective. Pt can ambulate as long as she does not have a headache. Regular diet. NS infusing at 100mL/hr. Plan to have surgery 12/30.     Problem: Adult Inpatient Plan of Care  Goal: Optimal Comfort and Wellbeing  12/28/2024 1421 by Rasheed Butler, RN  Outcome: Progressing     Problem: Adult Inpatient Plan of Care  Goal: Optimal Comfort and Wellbeing  12/28/2024 1421 by Rasheed Butler, RN  Outcome: Progressing   Goal Outcome Evaluation:

## 2024-12-28 NOTE — OP NOTE
Pt seen and examined  Very unusual spontaneous drainage 1 month from surgery  No neuro deficits  Minimal HA  Possible very slow spinal fluid leak  Plan to OR Monday morning for evaluation  Very much appreciate ER docs and hospitalists work with this pt.  I have told pt its ok to get up as long as she is not having headaches that are severe  Call with questions  W[place]ster  Cell: [phone]

## 2024-12-29 ENCOUNTER — ANESTHESIA (OUTPATIENT)
Dept: SURGERY | Facility: CLINIC | Age: 79
End: 2024-12-29
Payer: COMMERCIAL

## 2024-12-29 PROCEDURE — 120N000001 HC R&B MED SURG/OB

## 2024-12-29 PROCEDURE — 250N000011 HC RX IP 250 OP 636: Performed by: INTERNAL MEDICINE

## 2024-12-29 PROCEDURE — 250N000013 HC RX MED GY IP 250 OP 250 PS 637: Performed by: INTERNAL MEDICINE

## 2024-12-29 PROCEDURE — 258N000003 HC RX IP 258 OP 636: Performed by: INTERNAL MEDICINE

## 2024-12-29 PROCEDURE — 99232 SBSQ HOSP IP/OBS MODERATE 35: CPT | Performed by: INTERNAL MEDICINE

## 2024-12-29 RX ORDER — ACETAMINOPHEN 325 MG/1
650 TABLET ORAL EVERY 8 HOURS PRN
Status: DISCONTINUED | OUTPATIENT
Start: 2024-12-29 | End: 2025-01-06 | Stop reason: HOSPADM

## 2024-12-29 RX ADMIN — PANTOPRAZOLE SODIUM 40 MG: 40 TABLET, DELAYED RELEASE ORAL at 08:20

## 2024-12-29 RX ADMIN — ONDANSETRON 4 MG: 2 INJECTION INTRAMUSCULAR; INTRAVENOUS at 08:21

## 2024-12-29 RX ADMIN — FUROSEMIDE 20 MG: 20 TABLET ORAL at 08:20

## 2024-12-29 RX ADMIN — METHOCARBAMOL 500 MG: 500 TABLET ORAL at 21:04

## 2024-12-29 RX ADMIN — METHOCARBAMOL 500 MG: 500 TABLET ORAL at 14:23

## 2024-12-29 RX ADMIN — ACETAMINOPHINE, CAFFEINE 2 TABLET: 500; 65 TABLET, FILM COATED ORAL at 11:49

## 2024-12-29 RX ADMIN — SODIUM CHLORIDE: 9 INJECTION, SOLUTION INTRAVENOUS at 23:42

## 2024-12-29 RX ADMIN — OXYCODONE 5 MG: 5 TABLET ORAL at 08:20

## 2024-12-29 RX ADMIN — ONDANSETRON 4 MG: 2 INJECTION INTRAMUSCULAR; INTRAVENOUS at 21:48

## 2024-12-29 RX ADMIN — METHOCARBAMOL 500 MG: 500 TABLET ORAL at 08:20

## 2024-12-29 RX ADMIN — PROCHLORPERAZINE EDISYLATE 5 MG: 5 INJECTION INTRAMUSCULAR; INTRAVENOUS at 03:39

## 2024-12-29 RX ADMIN — METHOCARBAMOL 500 MG: 500 TABLET ORAL at 00:13

## 2024-12-29 RX ADMIN — SENNOSIDES AND DOCUSATE SODIUM 2 TABLET: 50; 8.6 TABLET ORAL at 08:20

## 2024-12-29 RX ADMIN — GABAPENTIN 100 MG: 100 CAPSULE ORAL at 21:04

## 2024-12-29 RX ADMIN — OXYCODONE 5 MG: 5 TABLET ORAL at 21:48

## 2024-12-29 RX ADMIN — OXYCODONE 5 MG: 5 TABLET ORAL at 03:39

## 2024-12-29 RX ADMIN — CITALOPRAM HYDROBROMIDE 20 MG: 20 TABLET ORAL at 08:20

## 2024-12-29 RX ADMIN — GABAPENTIN 100 MG: 100 CAPSULE ORAL at 14:23

## 2024-12-29 RX ADMIN — GABAPENTIN 100 MG: 100 CAPSULE ORAL at 08:20

## 2024-12-29 RX ADMIN — METOPROLOL SUCCINATE ER TABLETS 12.5 MG: 25 TABLET, FILM COATED, EXTENDED RELEASE ORAL at 21:04

## 2024-12-29 ASSESSMENT — ACTIVITIES OF DAILY LIVING (ADL)
ADLS_ACUITY_SCORE: 62
DEPENDENT_IADLS:: INDEPENDENT
ADLS_ACUITY_SCORE: 62

## 2024-12-29 NOTE — PROGRESS NOTES
[facility]    Medicine Progress Note - Hospitalist Service    Date of Admission:  12/27/2024    Assessment & Plan   Pam Chino is a 79 year old female who underwent L4-5 decompression surgery for chronic severe spinal stenosis on 12/2/2024 with complication of dural tear, admitted on 12/27/2024 for drainage from surgical wound and headache.  Dr. Winchester with [facility] plans for surgery on Monday.      Status post L4-L5 decompression surgery  Neural tear, intraoperative  Postoperative fluid collection  Suspect CSF fluid collection from dural tear.   - bedrest with bedside commode  - continue acetaminophen-caffeine 500-65 mg 2 tablets every 6 hours as needed for headache  - continue oxycodone 5 mg every 4 hours as needed for severe headache  - dilaudid 0.2-0.4 mg IV q2h prn.  - NPO on Sunday evening.   Appreciate [facility] consultation     Depression and anxiety  PTA medication: Celexa home dose     Essential hypertension  PTA medication: Furosemide 20 mg daily, metoprolol 12.5 mg at bedtime    Normocytic anemia  Possible iron deficiency anemia.   Reticulocyte level pending.   Low iron  and low normal ferritin at 36. Vitamin B12 level normal.   Likely iron deficiency anemia.   Hemoglobin down to 8.8 g/dL.  - No indication for transfusion at this time.  - start iron sulfate 325 mg po every other day.   - monitor anemia per PCP, consider age appropriate screening.      GERD  PTA medication: Omeprazole 20 mg daily          Diet: NPO per Anesthesia Guidelines for Procedure/Surgery Except for: Meds, Ice Chips  Regular Diet Adult    DVT Prophylaxis: Pneumatic Compression Devices  Mejia Catheter: Not present  Lines: None     Cardiac Monitoring: None  Code Status: Full Code      Clinically Significant Risk Factors               # Hypoalbuminemia: Lowest albumin = 3.2 g/dL at 12/28/2024  6:56 AM, will monitor as appropriate     # Hypertension: Noted on problem list                        Social Drivers of Health    Housing Stability: High Risk (12/2/2024)    Housing Stability     Do you have housing? : No     Are you worried about losing your housing?: No   Tobacco Use: Medium Risk (12/2/2024)    Patient History     Smoking Tobacco Use: Former     Smokeless Tobacco Use: Never     Passive Exposure: Past          Disposition Plan     Medically Ready for Discharge: Anticipated in 2-4 Days             Bernard Ortiz DO  Hospitalist Service  [facility]  Securely message with [employer] (more info)  Text page via [employer] Paging/Directory   ______________________________________________________________________    Interval History   Pam reports headache with sitting, standing.  She reports tylenol/caffeine have helped with headache.  She denies fever or chills.  No neck stiffness.     Physical Exam   Vital Signs: Temp: 99.1  F (37.3  C) Temp src: Oral BP: 129/65 Pulse: 88   Resp: 16 SpO2: 95 % O2 Device: None (Room air) Oxygen Delivery: 1 LPM  Weight: 138 lbs 0 oz  General: Appears in no acute distress, awake, alert, interactive.    Eyes: Conjunctivae non-injected. Sclera anicteric.  HENT: Atraumatic.  Neck: Supple.  Respiratory/Chest: Respiration unlabored.  Abdomen: non distended  Musculoskeletal: Normal extremities. No edema or erythema.  Skin: Normal color. No rash or diaphoresis. Soft tissue swelling around low back lumbar incision.  No erythema.    Neurologic: Face symmetric, moves all extremities spontaneously. Speech clear.  Psychiatric: Oriented to person, place, and time. Affect appropriate.      Medical Decision Making       40 MINUTES SPENT BY ME on the date of service doing chart review, history, exam, documentation & further activities per the note.      Data         Imaging results reviewed over the past 24 hrs:   No results found for this or any previous visit (from the past 24 hours).

## 2024-12-29 NOTE — PLAN OF CARE
Goal Outcome Evaluation:         Patient vital signs are at baseline: Yes  Patient able to ambulate as they were prior to admission or with assist devices provided by therapies during their stay:  No,  Reason:  difficulty being upright with back pain, headache  Patient MUST void prior to discharge:  Yes  Patient able to tolerate oral intake:  Yes, diminished appetite  Pain has adequate pain control using Oral analgesics:  Yes  Does patient have an identified :  Yes  Has goal D/C date and time been discussed with patient:  Yes    Dressing to lower back changed in AM due to saturation with serous fluid. Dressing monitored throughout day, with noted decreased drainage. Patient reports intermittent headache when getting up to use commode. Patient reports minimal nausea throughout day. Headache and back pain tolerable with PO analgesics

## 2024-12-29 NOTE — PLAN OF CARE
Goal Outcome Evaluation:      Plan of Care Reviewed With: patient          Outcome Evaluation: SW will continue to work with IDT to determine d/c needs and facilitate placement

## 2024-12-29 NOTE — CONSULTS
Care Management Initial Consult    General Information  Assessment completed with: Patient, Children,    Type of CM/SW Visit: Initial Assessment    Primary Care Provider verified and updated as needed:     Readmission within the last 30 days: previous discharge plan unsuccessful      Reason for Consult: discharge planning  Advance Care Planning: Advance Care Planning Reviewed: no concerns identified          Communication Assessment  Patient's communication style: spoken language (English or Bilingual)             Cognitive  Cognitive/Neuro/Behavioral: WDL  Level of Consciousness: alert  Arousal Level: opens eyes spontaneously  Orientation: oriented x 4  Mood/Behavior: cooperative, behavior appropriate to situation  Best Language: 0 - No aphasia  Speech: clear, spontaneous    Living Environment:   People in home: alone     Current living Arrangements: house      Able to return to prior arrangements: yes       Family/Social Support:  Care provided by: self, homecare agency ([facility])  Provides care for: no one  Marital Status:   Support system: Children          Description of Support System: Supportive, Involved         Current Resources:   Patient receiving home care services: Yes  Skilled Home Care Services: Occupational Therapy, Physical Therapy     Community Resources: Home Care  Equipment currently used at home: cane, straight, walker, standard, raised toilet seat, grab bar, tub/shower, shower chair  Supplies currently used at home: None    Employment/Financial:  Employment Status: retired        Financial Concerns: none   Referral to Financial Worker: No       Does the patient's insurance plan have a 3 day qualifying hospital stay waiver?  Yes     Which insurance plan 3 day waiver is available? Alternative insurance waiver    Will the waiver be used for post-acute placement? Undetermined at this time    Lifestyle & Psychosocial Needs:  Social Drivers of Health     Food Insecurity: Low Risk   (12/2/2024)    Food Insecurity     Within the past 12 months, did you worry that your food would run out before you got money to buy more?: No     Within the past 12 months, did the food you bought just not last and you didn t have money to get more?: No   Depression: Not at risk (12/5/2023)    PHQ-2     PHQ-2 Score: 2   Housing Stability: High Risk (12/2/2024)    Housing Stability     Do you have housing? : No     Are you worried about losing your housing?: No   Tobacco Use: Medium Risk (12/2/2024)    Patient History     Smoking Tobacco Use: Former     Smokeless Tobacco Use: Never     Passive Exposure: Past   Financial Resource Strain: Low Risk  (12/2/2024)    Financial Resource Strain     Within the past 12 months, have you or your family members you live with been unable to get utilities (heat, electricity) when it was really needed?: No   Alcohol Use: Not on file   Transportation Needs: Low Risk  (12/2/2024)    Transportation Needs     Within the past 12 months, has lack of transportation kept you from medical appointments, getting your medicines, non-medical meetings or appointments, work, or from getting things that you need?: No   Physical Activity: Not on file   Interpersonal Safety: Low Risk  (12/2/2024)    Interpersonal Safety     Do you feel physically and emotionally safe where you currently live?: Yes     Within the past 12 months, have you been hit, slapped, kicked or otherwise physically hurt by someone?: No     Within the past 12 months, have you been humiliated or emotionally abused in other ways by your partner or ex-partner?: No   Stress: Not on file   Social Connections: Not on file   Health Literacy: Not on file       Functional Status:  Prior to admission patient needed assistance:   Dependent ADLs:: Ambulation-cane  Dependent IADLs:: Independent       Mental Health Status:  Mental Health Status: No Current Concerns       Chemical Dependency Status:  Chemical Dependency Status: No Current  Concerns             Values/Beliefs:  Spiritual, Cultural Beliefs, Yazidism Practices, Values that affect care: no               Discussed  Partnership in Safe Discharge Planning  document with patient/family: Yes    Additional Information:  Pam Chino is a(n) 79 year old year old female who presented with Wound drainage [T14.8XXA]  Nonintractable headache, unspecified chronicity pattern, unspecified headache type [R51.9].     CM spoke with pt at bedside. Introduced self and role. Child(jones), Akanksha, assisted with completing assessment. Patient lives in a(n) house alone. Patient is independent with IADL/ADLs. Anticipated that patient will discharge to [facility].    Pt would like to return back to home, but is open to TCU if needed. Pt was recently at [facility] and would like to go back there if TCU is needed.    Pt states she is currently open to home care services with [facility] for OT and PT, however hasn't started the PT yet     Pt denies any further needs at this time.    Contacts:  Extended Emergency Contact Information  Primary Emergency Contact: Cheli Chino   [place]  Mobile Phone: [phone]  Relation: Daughter      Next Steps: Verify that pt is open to home care services with [facility].     Pt to have surgery 12/30    1406~Verified with [facility], pt is open to services. Currently getting OT, hasn't started to get PT and HHA services yet.    Mainor Byrne RN

## 2024-12-29 NOTE — PLAN OF CARE
After getting up patient will have bouts of nausea and pain. Oxycodone 5 mg given and zofran and compazine given overnight. All are effective. Pt was able to sleep. Pt did c/o headache d/t pain.     Educated patient on reason for bedrest and if the headache is happening it would be best to use a purewick to lessen headaches/nausea and pain. Pt seems reluctant to not ambulate when able. Using bedside commode.     Problem: Nausea and Vomiting  Goal: Nausea and Vomiting Relief  Outcome: Progressing     Problem: Pain Acute  Goal: Optimal Pain Control and Function  Outcome: Progressing  Intervention: Develop Pain Management Plan  Recent Flowsheet Documentation  Taken 12/28/2024 2112 by Shawn Mayers RN  Pain Management Interventions: medication (see MAR)  Intervention: Prevent or Manage Pain  Recent Flowsheet Documentation  Taken 12/29/2024 0000 by Shawn Mayers, RN  Medication Review/Management: medications reviewed  Taken 12/28/2024 2100 by Shawn Mayers, RN  Medication Review/Management: medications reviewed

## 2024-12-30 ENCOUNTER — APPOINTMENT (OUTPATIENT)
Dept: RADIOLOGY | Facility: CLINIC | Age: 79
End: 2024-12-30
Attending: ORTHOPAEDIC SURGERY
Payer: COMMERCIAL

## 2024-12-30 LAB
BACTERIA SPEC CULT: NORMAL
GRAM STAIN RESULT: NORMAL
GRAM STAIN RESULT: NORMAL

## 2024-12-30 PROCEDURE — 0SP004Z REMOVAL OF INTERNAL FIXATION DEVICE FROM LUMBAR VERTEBRAL JOINT, OPEN APPROACH: ICD-10-PCS | Performed by: ORTHOPAEDIC SURGERY

## 2024-12-30 PROCEDURE — 710N000010 HC RECOVERY PHASE 1, LEVEL 2, PER MIN: Performed by: ORTHOPAEDIC SURGERY

## 2024-12-30 PROCEDURE — 250N000011 HC RX IP 250 OP 636: Performed by: NURSE ANESTHETIST, CERTIFIED REGISTERED

## 2024-12-30 PROCEDURE — C1713 ANCHOR/SCREW BN/BN,TIS/BN: HCPCS | Performed by: ORTHOPAEDIC SURGERY

## 2024-12-30 PROCEDURE — 272N000001 HC OR GENERAL SUPPLY STERILE: Performed by: ORTHOPAEDIC SURGERY

## 2024-12-30 PROCEDURE — 999N000182 XR SURGERY CARM FLUORO GREATER THAN 5 MIN

## 2024-12-30 PROCEDURE — 250N000009 HC RX 250: Performed by: STUDENT IN AN ORGANIZED HEALTH CARE EDUCATION/TRAINING PROGRAM

## 2024-12-30 PROCEDURE — 00QT0ZZ REPAIR SPINAL MENINGES, OPEN APPROACH: ICD-10-PCS | Performed by: ORTHOPAEDIC SURGERY

## 2024-12-30 PROCEDURE — 250N000013 HC RX MED GY IP 250 OP 250 PS 637: Performed by: INTERNAL MEDICINE

## 2024-12-30 PROCEDURE — 999N000141 HC STATISTIC PRE-PROCEDURE NURSING ASSESSMENT: Performed by: ORTHOPAEDIC SURGERY

## 2024-12-30 PROCEDURE — P9045 ALBUMIN (HUMAN), 5%, 250 ML: HCPCS | Performed by: NURSE ANESTHETIST, CERTIFIED REGISTERED

## 2024-12-30 PROCEDURE — 250N000011 HC RX IP 250 OP 636: Performed by: PHYSICIAN ASSISTANT

## 2024-12-30 PROCEDURE — 360N000084 HC SURGERY LEVEL 4 W/ FLUORO, PER MIN: Performed by: ORTHOPAEDIC SURGERY

## 2024-12-30 PROCEDURE — 258N000003 HC RX IP 258 OP 636: Performed by: INTERNAL MEDICINE

## 2024-12-30 PROCEDURE — 250N000013 HC RX MED GY IP 250 OP 250 PS 637: Performed by: PHYSICIAN ASSISTANT

## 2024-12-30 PROCEDURE — 258N000001 HC RX 258: Performed by: ORTHOPAEDIC SURGERY

## 2024-12-30 PROCEDURE — 258N000003 HC RX IP 258 OP 636: Performed by: NURSE ANESTHETIST, CERTIFIED REGISTERED

## 2024-12-30 PROCEDURE — 250N000009 HC RX 250: Performed by: NURSE ANESTHETIST, CERTIFIED REGISTERED

## 2024-12-30 PROCEDURE — 250N000011 HC RX IP 250 OP 636: Performed by: STUDENT IN AN ORGANIZED HEALTH CARE EDUCATION/TRAINING PROGRAM

## 2024-12-30 PROCEDURE — 87205 SMEAR GRAM STAIN: CPT | Performed by: ORTHOPAEDIC SURGERY

## 2024-12-30 PROCEDURE — 120N000001 HC R&B MED SURG/OB

## 2024-12-30 PROCEDURE — 00UT0KZ SUPPLEMENT SPINAL MENINGES WITH NONAUTOLOGOUS TISSUE SUBSTITUTE, OPEN APPROACH: ICD-10-PCS | Performed by: ORTHOPAEDIC SURGERY

## 2024-12-30 PROCEDURE — 370N000017 HC ANESTHESIA TECHNICAL FEE, PER MIN: Performed by: ORTHOPAEDIC SURGERY

## 2024-12-30 PROCEDURE — C1763 CONN TISS, NON-HUMAN: HCPCS | Performed by: ORTHOPAEDIC SURGERY

## 2024-12-30 PROCEDURE — 250N000013 HC RX MED GY IP 250 OP 250 PS 637

## 2024-12-30 PROCEDURE — 99232 SBSQ HOSP IP/OBS MODERATE 35: CPT | Performed by: INTERNAL MEDICINE

## 2024-12-30 PROCEDURE — 250N000011 HC RX IP 250 OP 636: Performed by: INTERNAL MEDICINE

## 2024-12-30 PROCEDURE — 0SH004Z INSERTION OF INTERNAL FIXATION DEVICE INTO LUMBAR VERTEBRAL JOINT, OPEN APPROACH: ICD-10-PCS | Performed by: ORTHOPAEDIC SURGERY

## 2024-12-30 PROCEDURE — 250N000009 HC RX 250: Performed by: ORTHOPAEDIC SURGERY

## 2024-12-30 PROCEDURE — 250N000025 HC SEVOFLURANE, PER MIN: Performed by: ORTHOPAEDIC SURGERY

## 2024-12-30 PROCEDURE — 87070 CULTURE OTHR SPECIMN AEROBIC: CPT | Performed by: ORTHOPAEDIC SURGERY

## 2024-12-30 PROCEDURE — 87075 CULTR BACTERIA EXCEPT BLOOD: CPT | Performed by: ORTHOPAEDIC SURGERY

## 2024-12-30 DEVICE — IMP SCR SET MEDT TSRH 3DX FLUSH BREAK 8351500: Type: IMPLANTABLE DEVICE | Site: SPINE LUMBAR | Status: FUNCTIONAL

## 2024-12-30 DEVICE — IMP CONNECTOR MEDT TSRH 3DX SMALL 8351520: Type: IMPLANTABLE DEVICE | Site: SPINE LUMBAR | Status: FUNCTIONAL

## 2024-12-30 DEVICE — DURAGEN® PLUS DURAL REGENERATION MATRIX, 1 IN X 1 IN (2.5 CM X 2.5 CM)
Type: IMPLANTABLE DEVICE | Site: SPINE LUMBAR | Status: FUNCTIONAL
Brand: DURAGEN® PLUS

## 2024-12-30 DEVICE — IMP ROD MEDT TSRH PREBENT 03.5CM 8370035: Type: IMPLANTABLE DEVICE | Site: SPINE LUMBAR | Status: FUNCTIONAL

## 2024-12-30 RX ORDER — CEFAZOLIN SODIUM/WATER 2 G/20 ML
SYRINGE (ML) INTRAVENOUS
Status: DISCONTINUED
Start: 2024-12-30 | End: 2024-12-30 | Stop reason: HOSPADM

## 2024-12-30 RX ORDER — OXYCODONE HYDROCHLORIDE 5 MG/1
5 TABLET ORAL
Status: CANCELLED | OUTPATIENT
Start: 2024-12-30

## 2024-12-30 RX ORDER — EPHEDRINE SULFATE 50 MG/ML
INJECTION, SOLUTION INTRAMUSCULAR; INTRAVENOUS; SUBCUTANEOUS PRN
Status: DISCONTINUED | OUTPATIENT
Start: 2024-12-30 | End: 2024-12-30

## 2024-12-30 RX ORDER — ACETAMINOPHEN 325 MG/1
975 TABLET ORAL EVERY 8 HOURS
Status: COMPLETED | OUTPATIENT
Start: 2024-12-30 | End: 2025-01-02

## 2024-12-30 RX ORDER — ONDANSETRON 4 MG/1
4 TABLET, ORALLY DISINTEGRATING ORAL EVERY 6 HOURS PRN
Status: DISCONTINUED | OUTPATIENT
Start: 2024-12-30 | End: 2024-12-30 | Stop reason: ALTCHOICE

## 2024-12-30 RX ORDER — FENTANYL CITRATE 50 UG/ML
INJECTION, SOLUTION INTRAMUSCULAR; INTRAVENOUS PRN
Status: DISCONTINUED | OUTPATIENT
Start: 2024-12-30 | End: 2024-12-30

## 2024-12-30 RX ORDER — NALOXONE HYDROCHLORIDE 0.4 MG/ML
0.1 INJECTION, SOLUTION INTRAMUSCULAR; INTRAVENOUS; SUBCUTANEOUS
Status: CANCELLED | OUTPATIENT
Start: 2024-12-30

## 2024-12-30 RX ORDER — HYDROMORPHONE HCL IN WATER/PF 6 MG/30 ML
0.2 PATIENT CONTROLLED ANALGESIA SYRINGE INTRAVENOUS
Status: DISCONTINUED | OUTPATIENT
Start: 2024-12-30 | End: 2025-01-06 | Stop reason: HOSPADM

## 2024-12-30 RX ORDER — HYDROXYZINE HYDROCHLORIDE 10 MG/1
10 TABLET, FILM COATED ORAL EVERY 6 HOURS PRN
Status: DISCONTINUED | OUTPATIENT
Start: 2024-12-30 | End: 2025-01-06 | Stop reason: HOSPADM

## 2024-12-30 RX ORDER — PROCHLORPERAZINE MALEATE 5 MG/1
5 TABLET ORAL EVERY 6 HOURS PRN
Status: DISCONTINUED | OUTPATIENT
Start: 2024-12-30 | End: 2024-12-30 | Stop reason: ALTCHOICE

## 2024-12-30 RX ORDER — HYDROMORPHONE HCL IN WATER/PF 6 MG/30 ML
0.4 PATIENT CONTROLLED ANALGESIA SYRINGE INTRAVENOUS
Status: DISCONTINUED | OUTPATIENT
Start: 2024-12-30 | End: 2025-01-06 | Stop reason: HOSPADM

## 2024-12-30 RX ORDER — SODIUM CHLORIDE, SODIUM LACTATE, POTASSIUM CHLORIDE, CALCIUM CHLORIDE 600; 310; 30; 20 MG/100ML; MG/100ML; MG/100ML; MG/100ML
INJECTION, SOLUTION INTRAVENOUS CONTINUOUS PRN
Status: DISCONTINUED | OUTPATIENT
Start: 2024-12-30 | End: 2024-12-30

## 2024-12-30 RX ORDER — OXYCODONE HYDROCHLORIDE 5 MG/1
10 TABLET ORAL EVERY 4 HOURS PRN
Status: DISCONTINUED | OUTPATIENT
Start: 2024-12-30 | End: 2025-01-06 | Stop reason: HOSPADM

## 2024-12-30 RX ORDER — CEFAZOLIN SODIUM/WATER 2 G/20 ML
SYRINGE (ML) INTRAVENOUS PRN
Status: DISCONTINUED | OUTPATIENT
Start: 2024-12-30 | End: 2024-12-30

## 2024-12-30 RX ORDER — POLYETHYLENE GLYCOL 3350 17 G/17G
17 POWDER, FOR SOLUTION ORAL DAILY
Status: DISCONTINUED | OUTPATIENT
Start: 2024-12-31 | End: 2025-01-03

## 2024-12-30 RX ORDER — LIDOCAINE 40 MG/G
CREAM TOPICAL
Status: DISCONTINUED | OUTPATIENT
Start: 2024-12-30 | End: 2024-12-30 | Stop reason: HOSPADM

## 2024-12-30 RX ORDER — FENTANYL CITRATE 50 UG/ML
25 INJECTION, SOLUTION INTRAMUSCULAR; INTRAVENOUS EVERY 5 MIN PRN
Status: DISCONTINUED | OUTPATIENT
Start: 2024-12-30 | End: 2024-12-30 | Stop reason: HOSPADM

## 2024-12-30 RX ORDER — ONDANSETRON 2 MG/ML
4 INJECTION INTRAMUSCULAR; INTRAVENOUS ONCE
Status: COMPLETED | OUTPATIENT
Start: 2024-12-30 | End: 2024-12-30

## 2024-12-30 RX ORDER — ONDANSETRON 2 MG/ML
4 INJECTION INTRAMUSCULAR; INTRAVENOUS EVERY 30 MIN PRN
Status: CANCELLED | OUTPATIENT
Start: 2024-12-30

## 2024-12-30 RX ORDER — ONDANSETRON 2 MG/ML
4 INJECTION INTRAMUSCULAR; INTRAVENOUS EVERY 6 HOURS PRN
Status: DISCONTINUED | OUTPATIENT
Start: 2024-12-30 | End: 2024-12-30 | Stop reason: ALTCHOICE

## 2024-12-30 RX ORDER — DEXAMETHASONE SODIUM PHOSPHATE 4 MG/ML
4 INJECTION, SOLUTION INTRA-ARTICULAR; INTRALESIONAL; INTRAMUSCULAR; INTRAVENOUS; SOFT TISSUE
Status: CANCELLED | OUTPATIENT
Start: 2024-12-30

## 2024-12-30 RX ORDER — FENTANYL CITRATE 50 UG/ML
50 INJECTION, SOLUTION INTRAMUSCULAR; INTRAVENOUS EVERY 5 MIN PRN
Status: DISCONTINUED | OUTPATIENT
Start: 2024-12-30 | End: 2024-12-30 | Stop reason: HOSPADM

## 2024-12-30 RX ORDER — CEFAZOLIN SODIUM 1 G/3ML
1 INJECTION, POWDER, FOR SOLUTION INTRAMUSCULAR; INTRAVENOUS EVERY 8 HOURS
Status: COMPLETED | OUTPATIENT
Start: 2024-12-30 | End: 2024-12-31

## 2024-12-30 RX ORDER — AMOXICILLIN 250 MG
1 CAPSULE ORAL 2 TIMES DAILY
Status: DISCONTINUED | OUTPATIENT
Start: 2024-12-30 | End: 2025-01-04

## 2024-12-30 RX ORDER — DEXAMETHASONE SODIUM PHOSPHATE 4 MG/ML
4 INJECTION, SOLUTION INTRA-ARTICULAR; INTRALESIONAL; INTRAMUSCULAR; INTRAVENOUS; SOFT TISSUE
Status: DISCONTINUED | OUTPATIENT
Start: 2024-12-30 | End: 2024-12-30 | Stop reason: HOSPADM

## 2024-12-30 RX ORDER — LORATADINE 10 MG/1
10 TABLET ORAL DAILY PRN
Status: DISCONTINUED | OUTPATIENT
Start: 2024-12-30 | End: 2025-01-06 | Stop reason: HOSPADM

## 2024-12-30 RX ORDER — MULTIVITAMIN,THERAPEUTIC
1 TABLET ORAL DAILY
Status: DISCONTINUED | OUTPATIENT
Start: 2024-12-31 | End: 2025-01-06 | Stop reason: HOSPADM

## 2024-12-30 RX ORDER — GABAPENTIN 100 MG/1
100 CAPSULE ORAL AT BEDTIME
Status: DISCONTINUED | OUTPATIENT
Start: 2024-12-30 | End: 2024-12-30 | Stop reason: ALTCHOICE

## 2024-12-30 RX ORDER — OXYCODONE HYDROCHLORIDE 5 MG/1
10 TABLET ORAL
Status: CANCELLED | OUTPATIENT
Start: 2024-12-30

## 2024-12-30 RX ORDER — BISACODYL 10 MG
10 SUPPOSITORY, RECTAL RECTAL DAILY PRN
Status: DISCONTINUED | OUTPATIENT
Start: 2024-12-30 | End: 2025-01-06 | Stop reason: HOSPADM

## 2024-12-30 RX ORDER — PROPOFOL 10 MG/ML
INJECTION, EMULSION INTRAVENOUS PRN
Status: DISCONTINUED | OUTPATIENT
Start: 2024-12-30 | End: 2024-12-30

## 2024-12-30 RX ORDER — ONDANSETRON 4 MG/1
4 TABLET, ORALLY DISINTEGRATING ORAL EVERY 30 MIN PRN
Status: CANCELLED | OUTPATIENT
Start: 2024-12-30

## 2024-12-30 RX ORDER — HYDROMORPHONE HCL IN WATER/PF 6 MG/30 ML
0.2 PATIENT CONTROLLED ANALGESIA SYRINGE INTRAVENOUS EVERY 5 MIN PRN
Status: DISCONTINUED | OUTPATIENT
Start: 2024-12-30 | End: 2024-12-30 | Stop reason: HOSPADM

## 2024-12-30 RX ORDER — NALOXONE HYDROCHLORIDE 0.4 MG/ML
0.1 INJECTION, SOLUTION INTRAMUSCULAR; INTRAVENOUS; SUBCUTANEOUS
Status: DISCONTINUED | OUTPATIENT
Start: 2024-12-30 | End: 2024-12-30 | Stop reason: HOSPADM

## 2024-12-30 RX ORDER — DEXAMETHASONE SODIUM PHOSPHATE 4 MG/ML
INJECTION, SOLUTION INTRA-ARTICULAR; INTRALESIONAL; INTRAMUSCULAR; INTRAVENOUS; SOFT TISSUE PRN
Status: DISCONTINUED | OUTPATIENT
Start: 2024-12-30 | End: 2024-12-30

## 2024-12-30 RX ORDER — ONDANSETRON 4 MG/1
4 TABLET, ORALLY DISINTEGRATING ORAL EVERY 30 MIN PRN
Status: DISCONTINUED | OUTPATIENT
Start: 2024-12-30 | End: 2024-12-30 | Stop reason: HOSPADM

## 2024-12-30 RX ORDER — ONDANSETRON 2 MG/ML
4 INJECTION INTRAMUSCULAR; INTRAVENOUS EVERY 30 MIN PRN
Status: DISCONTINUED | OUTPATIENT
Start: 2024-12-30 | End: 2024-12-30 | Stop reason: HOSPADM

## 2024-12-30 RX ORDER — HYDROMORPHONE HCL IN WATER/PF 6 MG/30 ML
0.4 PATIENT CONTROLLED ANALGESIA SYRINGE INTRAVENOUS EVERY 5 MIN PRN
Status: DISCONTINUED | OUTPATIENT
Start: 2024-12-30 | End: 2024-12-30 | Stop reason: HOSPADM

## 2024-12-30 RX ORDER — OXYCODONE HYDROCHLORIDE 5 MG/1
5 TABLET ORAL EVERY 4 HOURS PRN
Status: DISCONTINUED | OUTPATIENT
Start: 2024-12-30 | End: 2025-01-06 | Stop reason: HOSPADM

## 2024-12-30 RX ADMIN — ONDANSETRON 4 MG: 2 INJECTION, SOLUTION INTRAMUSCULAR; INTRAVENOUS at 09:17

## 2024-12-30 RX ADMIN — DEXMEDETOMIDINE HYDROCHLORIDE 8 MCG: 100 INJECTION, SOLUTION INTRAVENOUS at 10:54

## 2024-12-30 RX ADMIN — FENTANYL CITRATE 50 MCG: 50 INJECTION INTRAMUSCULAR; INTRAVENOUS at 12:07

## 2024-12-30 RX ADMIN — METHOCARBAMOL 500 MG: 500 TABLET ORAL at 20:27

## 2024-12-30 RX ADMIN — ACETAMINOPHEN 650 MG: 325 TABLET ORAL at 00:28

## 2024-12-30 RX ADMIN — ALBUMIN (HUMAN): 12.5 SOLUTION INTRAVENOUS at 11:00

## 2024-12-30 RX ADMIN — DEXAMETHASONE SODIUM PHOSPHATE 4 MG: 4 INJECTION, SOLUTION INTRA-ARTICULAR; INTRALESIONAL; INTRAMUSCULAR; INTRAVENOUS; SOFT TISSUE at 10:16

## 2024-12-30 RX ADMIN — ONDANSETRON 4 MG: 2 INJECTION INTRAMUSCULAR; INTRAVENOUS at 10:35

## 2024-12-30 RX ADMIN — FENTANYL CITRATE 50 MCG: 50 INJECTION INTRAMUSCULAR; INTRAVENOUS at 10:31

## 2024-12-30 RX ADMIN — SODIUM CHLORIDE: 9 INJECTION, SOLUTION INTRAVENOUS at 05:59

## 2024-12-30 RX ADMIN — ACETAMINOPHEN 975 MG: 325 TABLET ORAL at 14:10

## 2024-12-30 RX ADMIN — ROCURONIUM BROMIDE 40 MG: 10 INJECTION, SOLUTION INTRAVENOUS at 10:15

## 2024-12-30 RX ADMIN — ACETAMINOPHEN 975 MG: 325 TABLET ORAL at 21:08

## 2024-12-30 RX ADMIN — DEXMEDETOMIDINE HYDROCHLORIDE 4 MCG: 100 INJECTION, SOLUTION INTRAVENOUS at 11:18

## 2024-12-30 RX ADMIN — Medication 2 G: at 10:05

## 2024-12-30 RX ADMIN — Medication 200 MG: at 11:31

## 2024-12-30 RX ADMIN — ROCURONIUM BROMIDE 10 MG: 10 INJECTION, SOLUTION INTRAVENOUS at 10:26

## 2024-12-30 RX ADMIN — Medication 5 MG: at 11:28

## 2024-12-30 RX ADMIN — GABAPENTIN 100 MG: 100 CAPSULE ORAL at 14:10

## 2024-12-30 RX ADMIN — METOPROLOL SUCCINATE ER TABLETS 12.5 MG: 25 TABLET, FILM COATED, EXTENDED RELEASE ORAL at 21:08

## 2024-12-30 RX ADMIN — OXYCODONE 10 MG: 5 TABLET ORAL at 22:20

## 2024-12-30 RX ADMIN — OXYCODONE 5 MG: 5 TABLET ORAL at 14:10

## 2024-12-30 RX ADMIN — SENNOSIDES AND DOCUSATE SODIUM 1 TABLET: 50; 8.6 TABLET ORAL at 20:29

## 2024-12-30 RX ADMIN — PROPOFOL 130 MG: 10 INJECTION, EMULSION INTRAVENOUS at 10:15

## 2024-12-30 RX ADMIN — Medication 10 MG: at 11:04

## 2024-12-30 RX ADMIN — FENTANYL CITRATE 50 MCG: 50 INJECTION INTRAMUSCULAR; INTRAVENOUS at 10:34

## 2024-12-30 RX ADMIN — PANTOPRAZOLE SODIUM 40 MG: 40 TABLET, DELAYED RELEASE ORAL at 09:07

## 2024-12-30 RX ADMIN — PROPOFOL 50 MCG/KG/MIN: 10 INJECTION, EMULSION INTRAVENOUS at 10:47

## 2024-12-30 RX ADMIN — ALBUMIN (HUMAN): 12.5 SOLUTION INTRAVENOUS at 10:45

## 2024-12-30 RX ADMIN — LIDOCAINE HYDROCHLORIDE 50 MG: 10 INJECTION, SOLUTION EPIDURAL; INFILTRATION; INTRACAUDAL; PERINEURAL at 10:15

## 2024-12-30 RX ADMIN — OXYCODONE 5 MG: 5 TABLET ORAL at 18:17

## 2024-12-30 RX ADMIN — METHOCARBAMOL 500 MG: 500 TABLET ORAL at 14:10

## 2024-12-30 RX ADMIN — PHENYLEPHRINE HYDROCHLORIDE 100 MCG: 10 INJECTION INTRAVENOUS at 10:45

## 2024-12-30 RX ADMIN — GABAPENTIN 100 MG: 100 CAPSULE ORAL at 20:27

## 2024-12-30 RX ADMIN — CEFAZOLIN 1 G: 1 INJECTION, POWDER, FOR SOLUTION INTRAMUSCULAR; INTRAVENOUS at 18:18

## 2024-12-30 RX ADMIN — DEXMEDETOMIDINE HYDROCHLORIDE 4 MCG: 100 INJECTION, SOLUTION INTRAVENOUS at 11:12

## 2024-12-30 RX ADMIN — SODIUM CHLORIDE, POTASSIUM CHLORIDE, SODIUM LACTATE AND CALCIUM CHLORIDE: 600; 310; 30; 20 INJECTION, SOLUTION INTRAVENOUS at 10:05

## 2024-12-30 RX ADMIN — FENTANYL CITRATE 50 MCG: 50 INJECTION INTRAMUSCULAR; INTRAVENOUS at 11:34

## 2024-12-30 RX ADMIN — PHENYLEPHRINE HYDROCHLORIDE 100 MCG: 10 INJECTION INTRAVENOUS at 10:38

## 2024-12-30 ASSESSMENT — ACTIVITIES OF DAILY LIVING (ADL)
ADLS_ACUITY_SCORE: 36
ADLS_ACUITY_SCORE: 36
ADLS_ACUITY_SCORE: 34
ADLS_ACUITY_SCORE: 62
ADLS_ACUITY_SCORE: 34
ADLS_ACUITY_SCORE: 62
ADLS_ACUITY_SCORE: 36
ADLS_ACUITY_SCORE: 36
ADLS_ACUITY_SCORE: 62
ADLS_ACUITY_SCORE: 36
ADLS_ACUITY_SCORE: 62
ADLS_ACUITY_SCORE: 62
ADLS_ACUITY_SCORE: 34
ADLS_ACUITY_SCORE: 62

## 2024-12-30 NOTE — ANESTHESIA CARE TRANSFER NOTE
Patient: Pam Chino    Procedure: Procedure(s):  INCISION AND DRAINAGE, LUMBAR SPINE       Diagnosis: Infection [B99.9]  Diagnosis Additional Information: No value filed.    Anesthesia Type:   General     Note:    Oropharynx: oropharynx clear of all foreign objects and spontaneously breathing  Level of Consciousness: drowsy  Oxygen Supplementation: face mask  Level of Supplemental Oxygen (L/min / FiO2): 8  Independent Airway: airway patency satisfactory and stable  Dentition: dentition unchanged  Vital Signs Stable: post-procedure vital signs reviewed and stable  Report to RN Given: handoff report given  Patient transferred to: PACU    Handoff Report: Identifed the Patient, Identified the Reponsible Provider, Reviewed the pertinent medical history, Discussed the surgical course, Reviewed Intra-OP anesthesia mangement and issues during anesthesia, Set expectations for post-procedure period and Allowed opportunity for questions and acknowledgement of understanding  Vitals:  Vitals Value Taken Time   /91 12/30/24 1210   Temp     Pulse 85 12/30/24 1214   Resp 15 12/30/24 1214   SpO2 100 % 12/30/24 1214   Vitals shown include unfiled device data.    Electronically Signed By: AVA Emery CRNA  December 30, 2024  12:16 PM

## 2024-12-30 NOTE — PLAN OF CARE
Problem: Adult Inpatient Plan of Care  Goal: Optimal Comfort and Wellbeing  Intervention: Monitor Pain and Promote Comfort  Recent Flowsheet Documentation  Taken 12/30/2024 1300 by Edilma Iverson RN  Pain Management Interventions: medication (see MAR)     Problem: Pain Acute  Goal: Optimal Pain Control and Function  Intervention: Develop Pain Management Plan  Recent Flowsheet Documentation  Taken 12/30/2024 1300 by Edilma Iverson RN  Pain Management Interventions: medication (see MAR)  Intervention: Prevent or Manage Pain  Recent Flowsheet Documentation  Taken 12/30/2024 1300 by Edilma Iverson RN  Bowel Elimination Promotion: adequate fluid intake promoted  Medication Review/Management:   medications reviewed   high-risk medications identified  Taken 12/30/2024 0844 by Edilma Iverson RN  Bowel Elimination Promotion: adequate fluid intake promoted  Medication Review/Management:   medications reviewed   high-risk medications identified       Pt A&Ox4. Had surgery and back to the floor around 1300. VSS on RA. Sats above 95% on RA. Endorsed pain 5/10 on right leg, oxycodone 5mg given, effective as per pt. Pt has carvalho intact and patent. Stay flat bedrest for 48hours.   End of the shift, pt c/o foot drop, absent dorsi flexion, reported to incoming nurse to follow up.  Calls appropriately, bed alarm on for pt safety.

## 2024-12-30 NOTE — ANESTHESIA PROCEDURE NOTES
Airway       Patient location during procedure: OR       Procedure Start/Stop Times: 12/30/2024 10:17 AM  Staff -        CRNA: Carrie Hutson APRN CRNA       Performed By: CRNA  Consent for Airway        Urgency: elective  Indications and Patient Condition       Indications for airway management: maximino-procedural       Induction type:intravenous       Mask difficulty assessment: 1 - vent by mask    Final Airway Details       Final airway type: endotracheal airway       Successful airway: ETT - single  Endotracheal Airway Details        ETT size (mm): 7.0       Cuffed: yes       Cuff volume (mL): 10       Successful intubation technique: direct laryngoscopy       DL Blade Type: Dinero 2       Grade View of Cords: 1       Adjucts: stylet       Position: Right       Measured from: lips       Secured at (cm): 20       Bite block used: Soft    Post intubation assessment        Placement verified by: capnometry, equal breath sounds and chest rise        Number of attempts at approach: 1       Number of other approaches attempted: 0       Secured with: tape       Ease of procedure: easy       Dentition: Unchanged       Dental guard used and removed. Dental Guard Type: Standard White.    Medication(s) Administered   Medication Administration Time: 12/30/2024 10:17 AM

## 2024-12-30 NOTE — PROVIDER NOTIFICATION
Notified provider of absent dorsiflexion on R foot. Provider called back that this is to be expected and continue to monitor.

## 2024-12-30 NOTE — PLAN OF CARE
Patient vital signs are at baseline: No,  Reason:  need 1 L of O2 overnight, denied SOB, but reporting congestion   Patient able to ambulate as they were prior to admission or with assist devices provided by therapies during their stay:  No,  Reason:  up to bedside commode, encouraged to use pure wick and lay in flat, but refused to use pure wick  Patient MUST void prior to discharge:  Yes  Patient able to tolerate oral intake:  Yes  Pain has adequate pain control using Oral analgesics:  Yes  Does patient have an identified :  Yes  Has goal D/C date and time been discussed with patient:  Yes      Goal Outcome Evaluation:      Plan of Care Reviewed With: patient    Overall Patient Progress: no changeOverall Patient Progress: no change    Alert and oriented x 4, able to make needs known, CMS intact, c/o back pain, PRN and scheduled pain medications were effective, minimal drainage at the beginning of the shift, denied nausea and HA, then when on the BSC, drainage became copious, dsg changed twice, clearish yellow, had nausea and worsen HA while up, gave PRN oxy and Zofran for good relief, denied nausea/HA afterwards, two CHG baths completed, IVF running at 100 ml/hr, NPO for surgery.

## 2024-12-30 NOTE — PROGRESS NOTES
[facility]    Medicine Progress Note - Hospitalist Service    Date of Admission:  12/27/2024    Assessment & Plan   Pam Chino is a 79 year old female who underwent L4-5 decompression surgery for chronic severe spinal stenosis on 12/2/2024 with complication of dural tear, admitted on 12/27/2024 for drainage from surgical wound and headache.  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       Status post L4-L5 decompression surgery  Status post repair of spinal fluid leak/pseudomeningocele.   - bedrest for 48 hours per orthop spin recommendations.  - continue acetaminophen-caffeine 500-65 mg 2 tablets every 6 hours as needed for headache  - continue postoperative pain management.   Appreciate [facility] consultation     Depression and anxiety  PTA medication: Celexa home dose     Essential hypertension  PTA medication: Furosemide 20 mg daily, metoprolol 12.5 mg at bedtime    Normocytic anemia  Possible iron deficiency anemia.   Reticulocyte level pending.   Low iron  and low normal ferritin at 36. Vitamin B12 level normal.   Likely iron deficiency anemia.   Hemoglobin down to 8.8 g/dL.  - No indication for transfusion at this time.  - start iron sulfate 325 mg po every other day.   - monitor anemia per PCP, consider age appropriate screening.      GERD  PTA medication: Omeprazole 20 mg daily          Diet: NPO per Anesthesia Guidelines for Procedure/Surgery Except for: Meds, Ice Chips    DVT Prophylaxis: Pneumatic Compression Devices  Mejia Catheter: Not present  Lines: None     Cardiac Monitoring: None  Code Status: Full Code      Clinically Significant Risk Factors               # Hypoalbuminemia: Lowest albumin = 3.2 g/dL at 12/28/2024  6:56 AM, will monitor as appropriate     # Hypertension: Noted on problem list                # Financial/Environmental Concerns: none         Social Drivers of Health    Housing Stability: High Risk (12/2/2024)     Housing Stability     Do you have housing? : No     Are you worried about losing your housing?: No   Tobacco Use: Medium Risk (12/2/2024)    Patient History     Smoking Tobacco Use: Former     Smokeless Tobacco Use: Never     Passive Exposure: Past          Disposition Plan     Medically Ready for Discharge: Anticipated in 2-4 Days     Bernard Ortiz DO  Hospitalist Service  [facility]  Securely message with [employer] (more info)  Text page via TPACK Paging/Directory   ______________________________________________________________________    Interval History   Pam was seen in the recovery area.  She feels well at this time.  Minimal pain currently after surgery.      Physical Exam   Vital Signs: Temp: 98  F (36.7  C) Temp src: Oral BP: (!) 153/63 Pulse: 63   Resp: 18 SpO2: 93 % O2 Device: None (Room air) Oxygen Delivery: 1 LPM  Weight: 134 lbs 14.74 oz    General: Appears in no acute distress, awake, alert, interactive.    Eyes: Conjunctivae non-injected. Sclera anicteric.  HENT: Atraumatic.  Neck: Supple.  Respiratory/Chest: Respiration unlabored.  Abdomen: non distended  Musculoskeletal: Normal extremities. No edema or erythema.  Skin: Normal color. No rash or diaphoresis. Soft tissue swelling around low back lumbar incision.  No erythema.    Neurologic: Face symmetric, moves all extremities spontaneously. Speech clear.  Psychiatric: Oriented to person, place, and time. Affect appropriate.    Medical Decision Making       40 MINUTES SPENT BY ME on the date of service doing chart review, history, exam, documentation & further activities per the note.      Data         Imaging results reviewed over the past 24 hrs:   No results found for this or any previous visit (from the past 24 hours).

## 2024-12-30 NOTE — OP NOTE
Operative Note    Name:  Pam Chino  PCP:  Emmy Ewing  Procedure Date:  12/30/2024    Procedure(s):  INCISION AND DRAINAGE, LUMBAR SPINE   L3-4 laminotomy to expose the possible dural tear  L4-5 revision laminotomy to gain more exposure  L4-5 repair of persistent spinal fluid leak/pseudomeningocele  Removal of right connectors and amada to gain exposure for the repair    Pre-Procedure Diagnosis:    Draining lumbar spine wound    Post-Procedure Diagnosis:    Very very likely drainage from persistent spinal fluid leak      Surgeon(s):  Vidal Winchester MD          ASSISTANT   Faraz Holder PA-C  to help with repair of the pseudomeningocele and the revision laminotomy    Findings:    Tiny spinal fluid leak on the right side just caudal to the L4 nerve root    Estimated Blood Loss:   100    Specimens:    ID Type Source Tests Collected by Time Destination   A : Deep spine, lumbar Swab Spine, Lumbar ANAEROBIC BACTERIAL CULTURE ROUTINE, GRAM STAIN, AEROBIC BACTERIAL CULTURE ROUTINE Vidal Winchester MD 12/30/2024 10:34 AM           Drains: None       Complications:    None    History of present illness/indication for procedure this is a pleasant 79-year-old woman  Who approximately 1 month ago underwent a L4-5 TLIF.  She had an incidental durotomy but we got a good repair of the dura.  We had her lay flat bedrest and put DuraGen patch and seal over the top.  She did not have any symptoms of a spinal fluid leak.  All of a sudden that she was at her primary care doctor's office her incision sprung open and she began to leak clear fluid.  We had her come to the hospital and an MRI was performed which showed a large collection of fluid.  I spoke with the patient and her daughter about the fact that I think we needed to explore the wound and see if there was a possible infection or if this was a spinal fluid leak with a very unusual presentation.    Description of procedure  Therefore the patient was brought to the  operating room today 12-.  She was prepped and draped in standard fashion for a lumbar spine procedure.  Pause was performed correctly identifying the patient procedure proposed plan and radiographs and all were in agreement.  We then opened the wound and there was obvious clear fluid.  We dissected down over the top of the screws and rods and over the top of the dura and we went down on the right side we could see that there was some spinal fluid coming from that area.  As we removed the previously placed Tisseel, more spinal fluid came out.  I could not visualize the situation well and so we made the decision that we should do a laminotomy at L3-4 and revision laminotomy at L4-5 to open the area more so that we would gain better visualization.  We also removed the right sided amada and connectors from the screws to give us better access to the lateral aspect of the dura.  After we performed all of this we were able to see better and found a small tear that was just caudal to the exiting L4 nerve root.  Eventually, after significant maneuvering I was able to get 2 stitches that had excellent purchase into that durotomy.  We performed multiple Valsalva maneuvers and there was no spinal fluid leak.  We also had to do quite a bit to stop the epidural bleeding because of the loss of turgor and at no point did we see any more spinal fluid coming out which was good.  We then irrigated the wound with 2 L of saline.  We did not see any purulence or any sign of infection.  We then placed the DuraGen patch over the area in the Tisseel.  We closed the incision in multiple layers with a 3-0 nylon at the skin.    Patient will be flat bedrest for 48 hours given the fact that she had bedrest for 24 hours and somehow still pulled the stitch out.  We will put a Mejia catheter.  At 48 hours she will sit up at 45 degrees/h for 2 hours.  We will make certain that she is moving nice and slow over the next week.  We will place abner  and pneumoboots for DVT prophylaxis.    Vidal Winchester MD     Date: 12/30/2024  Time: 11:28 AM    Implant Name Type Inv. Item Serial No.  Lot No. LRB No. Used Action   IMP SCR SET MEDT TSRH 3DX FLUSH BREAK [id number] - [id number] Metallic Hardware/[place] IMP SCR SET MEDT TSRH 3DX FLUSH BREAK [id number]  [employer]  N/A 1 Implanted   IMP CONNECTOR MEDT TSRH 3DX SMALL [id number] - [id number] Metallic Hardware/[place] IMP CONNECTOR MEDT TSRH 3DX SMALL [id number]  [employer]  N/A 1 Implanted   ALLOGRAFT DURAGEN PLUS 1 X 1 DP-1011 - [id number] Other ALLOGRAFT DURAGEN PLUS 1 X 1 DP-1011  [employer]  N/A 1 Implanted   IMP IRIS MEDT TSRH PREBENT 03.5CM [id number] - [id number] Metallic Hardware/[place] IMP IRIS MEDT TSRH PREBENT 03.5CM [id number]  [employer]-DANEK NA N/A 1 Implanted   IMP CONNECTOR MEDT TSRH 3DX SMALL [id number] - [id number] Metallic Hardware/[place] IMP CONNECTOR MEDT TSRH 3DX SMALL [id number]  [employer] NA N/A 1 Implanted

## 2024-12-30 NOTE — ANESTHESIA POSTPROCEDURE EVALUATION
Patient: Pam Chino    Procedure: Procedure(s):  INCISION AND DRAINAGE, LUMBAR SPINE       Anesthesia Type:  General    Note:  Disposition: Outpatient   Postop Pain Control: Uneventful            Sign Out: Well controlled pain   PONV: No   Neuro/Psych: Uneventful            Sign Out: Acceptable/Baseline neuro status   Airway/Respiratory: Uneventful            Sign Out: Acceptable/Baseline resp. status   CV/Hemodynamics: Uneventful            Sign Out: Acceptable CV status; No obvious hypovolemia; No obvious fluid overload   Other NRE: NONE   DID A NON-ROUTINE EVENT OCCUR? No    Event details/Postop Comments:  Patient recovering comfortably.        Last vitals:  Vitals Value Taken Time   /58 12/30/24 1250   Temp 37.4  C (99.3  F) 12/30/24 1240   Pulse 97 12/30/24 1252   Resp 22 12/30/24 1252   SpO2 97 % 12/30/24 1252   Vitals shown include unfiled device data.    Electronically Signed By: Tino Molina DO  December 30, 2024  2:34 PM

## 2024-12-30 NOTE — ANESTHESIA PREPROCEDURE EVALUATION
"Anesthesia Pre-Procedure Evaluation    Patient: Pam Chino   MRN: [id number] : 1945        Procedure : Procedure(s):  INCISION AND DRAINAGE, LUMBAR SPINE          Past Medical History:   Diagnosis Date     Anxiety      Basal cell carcinoma      Chest pain 2011     Depression      Gastritis      GERD (gastroesophageal reflux disease)      Nodular basal cell carcinoma 2014    Left distal calf      Osteoporosis      Patella fracture 2006     Scoliosis       Past Surgical History:   Procedure Laterality Date     COLONOSCOPY       FUSION TRANSFORAMINAL LUMBAR INTERBODY, 1 LEVEL, POSTERIOR APPROACH Right 2024    Procedure: RIGHT LUMBAR 4-5 TRANSFORAMINAL LUMBAR INTERBODY FUSION;  Surgeon: Vidal Winchester MD;  Location: [facility]     MOHS MICROGRAPHIC PROCEDURE Left     BCE distal calf     OTHER SURGICAL HISTORY  1997    laser cone biopsy     OTHER SURGICAL HISTORY  Oct. 2015    nose bxCA     SKIN CANCER EXCISION      maru. leg- in office     UPPER GASTROINTESTINAL ENDOSCOPY       ZZC TOTAL KNEE ARTHROPLASTY Right 2015    Procedure: RIGHT KNEE TOTAL ARTHROPLASTY;  Surgeon: Benny Lopez MD;  Location: [facility];  Service: Orthopedics      Allergies   Allergen Reactions     Ibuprofen Other (See Comments)     Stomach Bleed      Other Environmental Allergy Other (See Comments)     Bandaids cause redness      Social History     Tobacco Use     Smoking status: Former     Current packs/day: 0.00     Types: Cigarettes     Start date: 11/10/1975     Quit date: 11/10/1980     Years since quittin.1     Passive exposure: Past     Smokeless tobacco: Never     Tobacco comments:     \"socially\"   Substance Use Topics     Alcohol use: Yes     Comment: Alcoholic Drinks/day: social      Wt Readings from Last 1 Encounters:   24 61.2 kg (134 lb 14.7 oz)        Anesthesia Evaluation            ROS/MED HX  ENT/Pulmonary:       Neurologic:       Cardiovascular: " "Comment: 12/2024  Interpretation Summary     The visual ejection fraction is 65-70%.  Left ventricular diastolic function is normal.  The right ventricle is normal in size and function.  There is mild (1+) mitral regurgitation.  There is mild to moderate (1-2+) tricuspid regurgitation.  The right ventricular systolic pressure is approximated at 36mmHg.      (+)  hypertension- -   -  - -                                      METS/Exercise Tolerance:     Hematologic:       Musculoskeletal:       GI/Hepatic:     (+) GERD,                   Renal/Genitourinary:       Endo:       Psychiatric/Substance Use:       Infectious Disease:       Malignancy:       Other:            Physical Exam    Airway        Mallampati: II   TM distance: > 3 FB   Neck ROM: full     Respiratory Devices and Support         Dental       (+) Minor Abnormalities - some fillings, tiny chips      Cardiovascular   cardiovascular exam normal          Pulmonary   pulmonary exam normal              OUTSIDE LABS:  CBC:   Lab Results   Component Value Date    WBC 4.1 12/28/2024    WBC 7.1 12/27/2024    HGB 8.8 (L) 12/28/2024    HGB 9.5 (L) 12/27/2024    HCT 27.6 (L) 12/28/2024    HCT 29.5 (L) 12/27/2024     12/28/2024     12/27/2024     BMP:   Lab Results   Component Value Date     12/28/2024     12/27/2024    POTASSIUM 4.1 12/28/2024    POTASSIUM 3.6 12/27/2024    CHLORIDE 103 12/28/2024    CHLORIDE 99 12/27/2024    CO2 28 12/28/2024    CO2 26 12/27/2024    BUN 9.4 12/28/2024    BUN 13.0 12/27/2024    CR 0.85 12/28/2024    CR 0.82 12/27/2024    GLC 98 12/28/2024     (H) 12/27/2024     COAGS:   Lab Results   Component Value Date    INR 1.04 12/28/2024     POC: No results found for: \"BGM\", \"HCG\", \"HCGS\"  HEPATIC:   Lab Results   Component Value Date    ALBUMIN 3.2 (L) 12/28/2024    PROTTOTAL 5.6 (L) 12/28/2024    ALT 11 12/28/2024    AST 18 12/28/2024    ALKPHOS 78 12/28/2024    BILITOTAL <0.2 12/28/2024     OTHER:   Lab " Results   Component Value Date    PIPER 9.2 12/28/2024    LIPASE 36 08/28/2018    SED 15 02/03/2023       Anesthesia Plan    ASA Status:  2    NPO Status:  NPO Appropriate    Anesthesia Type: General.     - Airway: ETT              Consents    Anesthesia Plan(s) and associated risks, benefits, and realistic alternatives discussed. Questions answered and patient/representative(s) expressed understanding.     - Discussed: Risks, Benefits and Alternatives for BOTH SEDATION and the PROCEDURE were discussed     - Discussed with:  Patient      - Extended Intubation/Ventilatory Support Discussed: No.      - Patient is DNR/DNI Status: No     Use of blood products discussed: No .     Postoperative Care    Pain management: IV analgesics, Oral pain medications.   PONV prophylaxis: Ondansetron (or other 5HT-3), Dexamethasone or Solumedrol     Comments:               Tino Molina,     I have reviewed the pertinent notes and labs in the chart from the past 30 days and (re)examined the patient.  Any updates or changes from those notes are reflected in this note.           # Hypoalbuminemia: Lowest albumin = 3.2 g/dL at 12/28/2024  6:56 AM, will monitor as appropriate     # Hypertension: Noted on problem list                # Financial/Environmental Concerns: none

## 2024-12-31 ENCOUNTER — APPOINTMENT (OUTPATIENT)
Dept: PHYSICAL THERAPY | Facility: CLINIC | Age: 79
End: 2024-12-31
Attending: PHYSICIAN ASSISTANT
Payer: COMMERCIAL

## 2024-12-31 ENCOUNTER — APPOINTMENT (OUTPATIENT)
Dept: ULTRASOUND IMAGING | Facility: CLINIC | Age: 79
End: 2024-12-31
Attending: INTERNAL MEDICINE
Payer: COMMERCIAL

## 2024-12-31 ENCOUNTER — APPOINTMENT (OUTPATIENT)
Dept: OCCUPATIONAL THERAPY | Facility: CLINIC | Age: 79
End: 2024-12-31
Attending: PHYSICIAN ASSISTANT
Payer: COMMERCIAL

## 2024-12-31 PROCEDURE — 99232 SBSQ HOSP IP/OBS MODERATE 35: CPT | Performed by: INTERNAL MEDICINE

## 2024-12-31 PROCEDURE — 97110 THERAPEUTIC EXERCISES: CPT | Mod: GP

## 2024-12-31 PROCEDURE — 120N000001 HC R&B MED SURG/OB

## 2024-12-31 PROCEDURE — 97166 OT EVAL MOD COMPLEX 45 MIN: CPT | Mod: GO

## 2024-12-31 PROCEDURE — 93971 EXTREMITY STUDY: CPT | Mod: RT

## 2024-12-31 PROCEDURE — 97535 SELF CARE MNGMENT TRAINING: CPT | Mod: GO

## 2024-12-31 PROCEDURE — 250N000013 HC RX MED GY IP 250 OP 250 PS 637: Performed by: PHYSICIAN ASSISTANT

## 2024-12-31 PROCEDURE — 250N000013 HC RX MED GY IP 250 OP 250 PS 637: Performed by: INTERNAL MEDICINE

## 2024-12-31 PROCEDURE — 97110 THERAPEUTIC EXERCISES: CPT | Mod: GO

## 2024-12-31 PROCEDURE — 97162 PT EVAL MOD COMPLEX 30 MIN: CPT | Mod: GP

## 2024-12-31 PROCEDURE — 250N000011 HC RX IP 250 OP 636: Performed by: PHYSICIAN ASSISTANT

## 2024-12-31 RX ADMIN — METHOCARBAMOL 500 MG: 500 TABLET ORAL at 20:57

## 2024-12-31 RX ADMIN — ACETAMINOPHEN 975 MG: 325 TABLET ORAL at 13:23

## 2024-12-31 RX ADMIN — GABAPENTIN 100 MG: 100 CAPSULE ORAL at 20:57

## 2024-12-31 RX ADMIN — CEFAZOLIN 1 G: 1 INJECTION, POWDER, FOR SOLUTION INTRAMUSCULAR; INTRAVENOUS at 01:58

## 2024-12-31 RX ADMIN — OXYCODONE 5 MG: 5 TABLET ORAL at 10:43

## 2024-12-31 RX ADMIN — THERA TABS 1 TABLET: TAB at 07:45

## 2024-12-31 RX ADMIN — PANTOPRAZOLE SODIUM 40 MG: 40 TABLET, DELAYED RELEASE ORAL at 07:45

## 2024-12-31 RX ADMIN — METHOCARBAMOL 500 MG: 500 TABLET ORAL at 13:23

## 2024-12-31 RX ADMIN — OXYCODONE 5 MG: 5 TABLET ORAL at 03:22

## 2024-12-31 RX ADMIN — OXYCODONE 5 MG: 5 TABLET ORAL at 19:26

## 2024-12-31 RX ADMIN — GABAPENTIN 100 MG: 100 CAPSULE ORAL at 07:45

## 2024-12-31 RX ADMIN — ACETAMINOPHEN 975 MG: 325 TABLET ORAL at 21:00

## 2024-12-31 RX ADMIN — ACETAMINOPHEN 975 MG: 325 TABLET ORAL at 06:17

## 2024-12-31 RX ADMIN — METHOCARBAMOL 500 MG: 500 TABLET ORAL at 07:44

## 2024-12-31 RX ADMIN — GABAPENTIN 100 MG: 100 CAPSULE ORAL at 13:23

## 2024-12-31 RX ADMIN — CITALOPRAM HYDROBROMIDE 20 MG: 20 TABLET ORAL at 07:45

## 2024-12-31 RX ADMIN — FUROSEMIDE 20 MG: 20 TABLET ORAL at 07:44

## 2024-12-31 ASSESSMENT — ACTIVITIES OF DAILY LIVING (ADL)
ADLS_ACUITY_SCORE: 36
PREVIOUS_RESPONSIBILITIES: MEAL PREP;HOUSEKEEPING;LAUNDRY;SHOPPING;MEDICATION MANAGEMENT;FINANCES;DRIVING
ADLS_ACUITY_SCORE: 36

## 2024-12-31 NOTE — PROVIDER NOTIFICATION
"Notified  Dr. Monte  at 4:20 AM:    Hi there! Pt is POD #1 of a spinal I&D and is on flat bedrest for this. She has been having a right foot drop with some tingling since surgery, which was reported to her surgery team per previous nurse. She is complaining of calf pain and pain from the knee down on that right side that she describes as aching. \"When its bad 5/10, with the oxy a 3/10\"R calf does not appear red or swollen, but I wanted to let you know! Thanks, Yoly    -US ordered     "

## 2024-12-31 NOTE — PLAN OF CARE
Problem: Adult Inpatient Plan of Care  Goal: Absence of Hospital-Acquired Illness or Injury  Intervention: Prevent and Manage VTE (Venous Thromboembolism) Risk  Recent Flowsheet Documentation  Taken 12/31/2024 0412 by Yoly Barahona RN  VTE Prevention/Management: SCDs on (sequential compression devices)  Taken 12/31/2024 0015 by Yoly Barahona RN  VTE Prevention/Management: SCDs on (sequential compression devices)     Problem: Adult Inpatient Plan of Care  Goal: Optimal Comfort and Wellbeing  Outcome: Progressing  Intervention: Monitor Pain and Promote Comfort  Recent Flowsheet Documentation  Taken 12/31/2024 0322 by Yoly Barahona RN  Pain Management Interventions:   medication (see MAR)   rest   quiet environment facilitated   Goal Outcome Evaluation:    Patient vital signs are at baseline: Yes, on 2L O2 overnight, on 1L now   Patient able to ambulate as they were prior to admission or with assist devices provided by therapies during their stay:  No,  Reason:  Bedrest   Patient MUST void prior to discharge:  No,  Reason:  carvalho in place until ambulatory  Patient able to tolerate oral intake:  Yes  Pain has adequate pain control using Oral analgesics:  Yes  Does patient have an identified :  Yes  Has goal D/C date and time been discussed with patient:  Yes    A&O. Experiencing R foot drop and R foot tingling, otherwise CMS intact. Pain managed with prn oxycodone. Clear liquid diet tolerated, BS hypoactive, not passing gas yet.

## 2024-12-31 NOTE — PROGRESS NOTES
"Orthopedic Progress Note      Assessment: 1 Day Post-Op  S/P Procedure(s):  INCISION AND DRAINAGE, LUMBAR SPINE  Lumbar 3-4 laminotomy, Lumbar 4-5 revision laminotomy @    Plan:   - Continue PT/OT  - Weightbearing status: WBAT can use brace for comfort  - No bending, twisting or lifting more than 10 pounds for 6 weeks.  - Anticoagulation: no chemical prophylaxis in addition to SCDs, madhu stockings and early ambulation.  - Orthosis consult placed for AFO brace. Foot drop noted after surgery. Spoke with the surgeon today and noted that there was a ton of work done around the nerve to repair the dural tear. There is a high likelihood she may have a foot drop for months or indefinitely   - Can come off of bed rest at noon tomorrow 1/1/25. Start with raising head of bed from flat to 45 degrees for 2 hours. Then advance every 30-45 minutes another 15 degrees until sitting up. If tolerates sitting up can trial moving to the chair and ambulation. If at any point in time they develop a headache return to bed and back to flat bed rest for another 12-24 hours.   - Once up past 45 degrees she needs to move VERY VERY SLOWLY LIKE A SLOTH.  - PT to see today to work on bedrest maneuvers   - Discharge planning:  progression in therapy and off bed rest    Subjective:  Pain: minimal  Chest pain, SOB: no  Nausea, Vomiting:  no  Lightheadedness, Dizziness:  no  Neuro:  Patient denies new onset numbness or paresthesias    Patient is doing well this morning notes that her foot drop is still present. I educated her that she will likely have one for awhile just due to the amount of work done around the nerve. Currently on bedrest. Doing well with no new nerve pain down the leg or numbness or tingling.    Objective:  /56 (BP Location: Right arm, Patient Position: Supine, Cuff Size: Adult Regular)   Pulse 88   Temp 98.4  F (36.9  C) (Oral)   Resp 12   Ht 1.651 m (5' 5\")   Wt 61.2 kg (134 lb 14.7 oz)   LMP  (LMP Unknown)   SpO2 " 99%   BMI 22.45 kg/m    The patient is A&Ox3. Appears comfortable.   Sensation is intact.   plantar flexion is intact.  Dorsalis pedis pulse intact.  Calves are soft and non-tender. Negative Josefa's.  The incision is covered. Dressing C/D/I.    Pertinent Labs   Lab Results: personally reviewed.   Lab Results   Component Value Date    INR 1.04 12/28/2024     Lab Results   Component Value Date    WBC 4.1 12/28/2024    HGB 8.8 (L) 12/28/2024    HCT 27.6 (L) 12/28/2024    MCV 92 12/28/2024     12/28/2024     Lab Results   Component Value Date     12/28/2024    CO2 28 12/28/2024         Report completed by:  Zayra Caballero PA-C/Dr. Winchester  [facility]    Date: 12/31/2024  Time: 9:11 AM

## 2024-12-31 NOTE — PLAN OF CARE
Problem: Adult Inpatient Plan of Care  Goal: Absence of Hospital-Acquired Illness or Injury  Intervention: Prevent and Manage VTE (Venous Thromboembolism) Risk  Recent Flowsheet Documentation  Taken 12/31/2024 1145 by Gabi Cohen RN  VTE Prevention/Management: SCDs on (sequential compression devices)  Taken 12/31/2024 0745 by Gabi Cohen RN  VTE Prevention/Management: SCDs on (sequential compression devices)     Problem: Adult Inpatient Plan of Care  Goal: Optimal Comfort and Wellbeing  Outcome: Progressing     Patient vital signs are at baseline: Yes  Patient able to ambulate as they were prior to admission or with assist devices provided by therapies during their stay:  No,  Reason:  bedrest until 01/01/2024 at 1200  Patient MUST void prior to discharge:  No,  Reason:  carvalho in place  Patient able to tolerate oral intake:  Yes, clear liquid diet  Pain has adequate pain control using Oral analgesics:  Yes  Does patient have an identified :  Yes  Has goal D/C date and time been discussed with patient:  Yes    Patient A&O, VSS, and CMS intact. Pain is manageable at this time. Dressing CDI (was told per PA dressing change does not need to happen today if dressing is CDI), therefore dressing was not changed. Carvalho in place and draining. Tolerating full liquid diet and passing gas. Audible and active bowel sounds. HOB flat and on bedrest until 01/01/2024 at 1200. Repositioning independently in bed and able to turn onto side. PT and OT saw patient today. Call light appropriate and all alarms in place.  Gabi Cohen RN

## 2024-12-31 NOTE — PROGRESS NOTES
12/31/24 1124   Appointment Info   Signing Clinician's Name / Credentials (OT) Jenny Klein, OTR/L   Living Environment   People in Home alone   Current Living Arrangements house;other (see comments)   Living Environment Comments Tub/shower with GB and shower chair. RTS [place]ity on the L   Self-Care   Usual Activity Tolerance good   Current Activity Tolerance moderate   Instrumental Activities of Daily Living (IADL)   Previous Responsibilities meal prep;housekeeping;laundry;shopping;medication management;finances;driving   General Information   Onset of Illness/Injury or Date of Surgery 01/27/25   Referring Physician Dr. Vidal Winchester   Patient/Family Therapy Goal Statement (OT) To return home   Additional Occupational Profile Info/Pertinent History of Current Problem Adm for spine surgery - Lumbar spinal stenosis.  Lami at L3-4 and L4-5 Rev. with Dural Tear  PMH:  HTN, Anxiety Disorder, basal cell CA, Ulcer of duodenal, R TKA, R ISABELA, L CT   Existing Precautions/Restrictions (S)  spinal;other (see comments)  (Strict Bedrest until 1-1-25 12:00N.)   Cognitive Status Examination   Follows Commands WNL   Visual Perception   Visual Impairment/Limitations corrective lenses for reading   Range of Motion Comprehensive   General Range of Motion no range of motion deficits identified   Bed Mobility   Bed Mobility rolling left   Rolling Left [place] (Bed Mobility) supervision;verbal cues   Transfers   Transfer Comments Pt on strict bedrest until 1/1/25 12:00 N.  No OOB activity today.   Activities of Daily Living   BADL Assessment/Intervention feeding;grooming   Grooming Assessment/Training   [place] Level (Grooming) grooming skills   Position (Grooming) other (see comments)  (resting on her L side to allow her to use her R hand for G/H cares.  Pt declined any cares today.)   Eating/Self Feeding   Position (Feeding) other (see comments)  (Lying on her L side in bed for eating.  Pt is aware of not eating in  supine.)   [place] Level (Feeding) feeding skills   Clinical Impression   Criteria for Skilled Therapeutic Interventions Met (OT) Yes, treatment indicated   OT Diagnosis Decreased indep with ADLs and trsfs due to spine surgery.   OT Problem List-Impairments impacting ADL problems related to;mobility   Assessment of Occupational Performance 3-5 Performance Deficits   Identified Performance Deficits dressing, toileting, G/H, trsfs, bathing   Planned Therapy Interventions (OT) ADL retraining;transfer training   Clinical Decision Making Complexity (OT) detailed assessment/moderate complexity   Risk & Benefits of therapy have been explained evaluation/treatment results reviewed;participants included;patient   OT Total Evaluation Time   OT Eval, Moderate Complexity Minutes (42177) 10   OT Goals   OT Predicted Duration/Target Date for Goal Attainment 01/06/25   OT Goals Lower Body Dressing;Bed Mobility;Toilet Transfer/Toileting;Hygiene/Grooming   OT: Hygiene/Grooming supervision/stand-by assist  (in sidelying.)   OT: Bed Mobility Modified independent  (log rolling technique.)   Interventions   Interventions Quick Adds Self-Care/Home Management;Therapeutic Procedures/Exercise   Self-Care/Home Management   Self-Care/Home Mgmt/ADL, Compensatory, Meal Prep Minutes (26926) 10   Treatment Detail/Skilled Intervention Pt resting supine flat in bed per dural tear protocol when therapist arrived.  Initial eval completed.  Practiced rolling to her L side.  VC needed for bending her knees and planting her feet on the mattress to assist with log rolling.  Pt did use her bedside rail to assist.  Pt is aware of rolling her hip and shoulder at the same time to protect her back.  Once in sidelying, Pt educated on completing G/H cares from this position.  Pt also educated on eating in sidelying to prevent chocking when in supine.  Rolling to Pt's L side, allows her freedom to use her R hand for G/H and eating tasks.  Pt agreed and  understood technique.  Returned to supine and completed bilat U/E exercises.  See exercises below.  At end of session, Pt was resting in supine with bed flat and call light in reach and bed alarm on.  RN aware.   Therapeutic Procedures/Exercise   Therapeutic Procedure: strength, endurance, ROM, flexibillity minutes (40974) 15   Symptoms Noted During/After Treatment none   Treatment Detail/Skilled Intervention Pt was educated on bedrest protocol U/E AROM exercises in supine.  All movement completed 10 reps each.  Shoulder flex/ext, shoulder abd/add, shoulder int/ext rotation, elbow flex/ext, forearm sup/pron, wrist flex/ext, finger flex/ext.  Pt was able to demonstrate her indep in completing this bedrest U.E. exercise program.  Tolerated well.  Pt is to complete these exercises 2-3 times per day while on bedrest.   OT Discharge Planning   OT Plan See Pt again on 1/2/25 and progress therapy when off of bedrest.   OT Discharge Recommendation (DC Rec) home with assist  (Pending progress with therapy.  If not progressing, may need TCU.)   OT Rationale for DC Rec Pt is on strict bedrest currently.  Once off of bedrest will assess Pt's ability to complete trsfs and ADLs and update d/c recommendations.   OT Brief overview of current status Pt on strict bedrest.  Pt SBA to mod indep with log rolling.  Provided Pt with bedrest U/E exercise program.   Total Session Time   Timed Code Treatment Minutes 25   Total Session Time (sum of timed and untimed services) 35

## 2024-12-31 NOTE — PROGRESS NOTES
12/31/24 1050   Appointment Info   Signing Clinician's Name / Credentials (PT) Heather Jordan, PT, DPT   Living Environment   People in Home alone   Current Living Arrangements house   Home Accessibility stairs to enter home;stairs within home   Number of Stairs, Main Entrance 2   Stair Railings, Main Entrance none   Number of Stairs, Within Home, Primary seven   Stair Railings, Within Home, Primary railing on right side (ascending)   Self-Care   Equipment Currently Used at Home walker, rolling   General Information   Onset of Illness/Injury or Date of Surgery 12/27/24   Referring Physician Bernard Ortiz, DO   Patient/Family Therapy Goals Statement (PT) to get better   Pertinent History of Current Problem (include personal factors and/or comorbidities that impact the POC) 79 year old female who underwent L4-5 decompression surgery for chronic severe spinal stenosis on 12/2/2024 with complication of dural tear, admitted on 12/27/2024 for drainage from surgical wound and headache.  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  Headache improved.  Continuing bedrest strict until 1/1/25.   Existing Precautions/Restrictions spinal;fall   Range of Motion (ROM)   Range of Motion ROM is WFL   Strength (Manual Muscle Testing)   Strength Comments Decreased R ankle dorsiflexion strength   Bed Mobility   Bed Mobility rolling left;rolling right   Rolling Left [place] (Bed Mobility) verbal cues;minimum assist (75% patient effort);1 person assist   Rolling Right [place] (Bed Mobility) verbal cues;minimum assist (75% patient effort);1 person assist   Comment, (Bed Mobility) Min assist x 1 with rolling in bed. Verbal cues for safety. Eduation on log roll technique. Min assist x 1 with log roll technique in bed.   Clinical Impression   Criteria for Skilled Therapeutic Intervention Yes, treatment indicated   PT Diagnosis (PT) impaired functional mobility   Influenced by the following  impairments weakness, pain   Functional limitations due to impairments transfers, ambulation, stairs   Clinical Presentation (PT Evaluation Complexity) stable   Clinical Presentation Rationale Pt presents as medically diagnosed   Clinical Decision Making (Complexity) moderate complexity   Planned Therapy Interventions (PT) balance training;bed mobility training;gait training;home exercise program;patient/family education;ROM (range of motion);stair training;strengthening;stretching;transfer training   Risk & Benefits of therapy have been explained evaluation/treatment results reviewed;care plan/treatment goals reviewed;patient   PT Total Evaluation Time   PT Eval, Moderate Complexity Minutes (43975) 10   Physical Therapy Goals   PT Frequency 5x/week   PT Predicted Duration/Target Date for Goal Attainment 01/10/25   PT Goals PT Goal 1   PT: Goal 1 Independent with spinal bedrest supine BLE strengthening exercises.   Interventions   Interventions Quick Adds Therapeutic Activity;Therapeutic Procedure   Therapeutic Procedure/Exercise   Ther. Procedure: strength, endurance, ROM, flexibillity Minutes (55940) 10   Treatment Detail/Skilled Intervention Pt educated on and guided through supine BLE strengthening exercises x 10 each. Education on bedrest spinal precautions. Verbal cues, tactile cues and assist with correct technique. Education on safe technique with spinal precautions.   Therapeutic Activity   Therapeutic Activities: dynamic activities to improve functional performance Minutes (74629) 5   Treatment Detail/Skilled Intervention Pt educated on spinal precautions. Education on safe log roll technique. Min assist x 1 with rolling to left with log roll technique. Min assist x 1 with rolling back to supine. Pt supine in bed at end of session with bed alarm on and call light within reach.   PT Discharge Planning   PT Plan progress bed mobility, review over therex, trial transfers and ambulation as appropriate   PT  Discharge Recommendation (DC Rec) (S)  Transitional Care Facility   PT Rationale for DC Rec Pt requires assist with bed mobility. Pt on flat bedrest for 48 hours. Once pt off bedrest, pt may progress and be more appropriate to discharge home.   PT Brief overview of current status Min assist x 1 with rolling in bed   Physical Therapy Time and Intention   Timed Code Treatment Minutes 15   Total Session Time (sum of timed and untimed services) 25       Heather Jordan, PT, DPT

## 2024-12-31 NOTE — PLAN OF CARE
Problem: Adult Inpatient Plan of Care  Goal: Optimal Comfort and Wellbeing  Outcome: Progressing     Problem: Pain Acute  Goal: Optimal Pain Control and Function  Outcome: Progressing  Intervention: Prevent or Manage Pain  Recent Flowsheet Documentation  Taken 12/30/2024 1615 by Sachin Blackwell, RN  Medication Review/Management:   medications reviewed   high-risk medications identified     Patient alert and oriented. VSS. On 2L of oxygen via nasal cannula.  Saline locked. Tolerating Clear liquid diet.  Flat on bed for 48 hours until January1 at noon. PRN oxycodone given for pain. Has carvalho in place, patent and draining. Call light within reach. Alarms on.

## 2024-12-31 NOTE — PROGRESS NOTES
Patient reports right lower leg pain extending from the knee into the calf.  Chart review reviewed  Not on anticoagulation.    Right lower extremity ultrasound.

## 2024-12-31 NOTE — PROGRESS NOTES
[facility]    Medicine Progress Note - Hospitalist Service    Date of Admission:  12/27/2024    Assessment & Plan   Pam Chino is a 79 year old female who underwent L4-5 decompression surgery for chronic severe spinal stenosis on 12/2/2024 with complication of dural tear, admitted on 12/27/2024 for drainage from surgical wound and headache.  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  Headache improved.  Continuing bedrest strict until 1/1/25.     Status post L4-L5 decompression surgery  Status post repair of spinal fluid leak/pseudomeningocele.   Right lower extremity pain, foot drop  US right lower extremity negative for DVT.   - bedrest for 48 hours per orthop spin recommendations. Finished 1/1/25 at noon.   - continue acetaminophen-caffeine 500-65 mg 2 tablets every 6 hours as needed for headache  - continue postoperative pain management.   Appreciate [place] orthopedic spine consultation     Depression and anxiety  PTA medication: Celexa home dose     Essential hypertension  PTA medication: Furosemide 20 mg daily, metoprolol 12.5 mg at bedtime    Normocytic anemia  Possible iron deficiency anemia.   Reticulocyte level pending.   Low iron  and low normal ferritin at 36. Vitamin B12 level normal.   Likely iron deficiency anemia.   Hemoglobin down to 8.8 g/dL.  - No indication for transfusion at this time.  - start iron sulfate 325 mg po every other day.   - monitor anemia per PCP, consider age appropriate screening.      GERD  PTA medication: Omeprazole 20 mg daily          Diet: Advance Diet as Tolerated: Clear Liquid Diet  Discharge Instruction - Regular Diet Adult    DVT Prophylaxis: Defer to primary service  Mejia Catheter: PRESENT, indication: Surgical procedure  Lines: None     Cardiac Monitoring: None  Code Status: Full Code      Clinically Significant Risk Factors               # Hypoalbuminemia: Lowest albumin = 3.2 g/dL at 12/28/2024  6:56 AM,  will monitor as appropriate     # Hypertension: Noted on problem list                # Financial/Environmental Concerns: none         Social Drivers of Health    Housing Stability: Low Risk  (12/30/2024)    Housing Stability     Do you have housing? : Yes     Are you worried about losing your housing?: No   Recent Concern: Housing Stability - High Risk (12/2/2024)    Housing Stability     Do you have housing? : No     Are you worried about losing your housing?: No   Tobacco Use: Medium Risk (12/2/2024)    Patient History     Smoking Tobacco Use: Former     Smokeless Tobacco Use: Never     Passive Exposure: Past          Disposition Plan     Medically Ready for Discharge: Anticipated in 2-4 Days             Bernard Ortiz DO  Hospitalist Service  [facility]  Securely message with [employer] (more info)  Text page via [employer] Paging/Directory   ______________________________________________________________________    Interval History   Reports right lower extremity pain starting at knee and extending into calf.  She has weakness/foot drop on right.      Physical Exam   Vital Signs: Temp: 98.4  F (36.9  C) Temp src: Oral BP: 109/56 Pulse: 88   Resp: 12 SpO2: 99 % O2 Device: None (Room air) Oxygen Delivery: 1 LPM  Weight: 134 lbs 14.74 oz    General: Appears in no acute distress, awake, alert, interactive.    Eyes: Conjunctivae non-injected. Sclera anicteric.  HENT: Atraumatic.  Neck: Supple.  Respiratory/Chest: Respiration unlabored.  Abdomen: non distended  Musculoskeletal: Normal extremities. No edema or erythema.  Skin: Normal color. No rash or diaphoresis. Soft tissue swelling around low back lumbar incision.  No erythema.    Neurologic: Face symmetric, moves all extremities spontaneously. Speech clear.  Psychiatric: Oriented to person, place, and time. Affect appropriate.    Medical Decision Making       40 MINUTES SPENT BY ME on the date of service doing chart review, history, exam,  documentation & further activities per the note.      Data         Imaging results reviewed over the past 24 hrs:   Recent Results (from the past 24 hours)   US Lower Extremity Venous Duplex Right    Narrative    EXAM: US LOWER EXTREMITY VENOUS DUPLEX RIGHT  LOCATION: [facility]  DATE: 12/31/2024    INDICATION: Right lower leg calf pain. POD #1. Not on anticoagulation.  COMPARISON: None.  TECHNIQUE: Venous Duplex ultrasound of the right lower extremity with and without compression, augmentation and duplex. Color flow and spectral Doppler with waveform analysis performed.    FINDINGS: Exam includes the common femoral, femoral, popliteal, and contralateral common femoral veins as well as segmentally visualized deep calf veins and greater saphenous vein.     RIGHT: No deep vein thrombosis. No superficial thrombophlebitis. No popliteal cyst.      Impression    IMPRESSION:  1.  No deep venous thrombosis in the right lower extremity.

## 2025-01-01 PROCEDURE — 250N000011 HC RX IP 250 OP 636: Performed by: INTERNAL MEDICINE

## 2025-01-01 PROCEDURE — 99232 SBSQ HOSP IP/OBS MODERATE 35: CPT | Performed by: INTERNAL MEDICINE

## 2025-01-01 PROCEDURE — 250N000013 HC RX MED GY IP 250 OP 250 PS 637: Performed by: PHYSICIAN ASSISTANT

## 2025-01-01 PROCEDURE — 120N000001 HC R&B MED SURG/OB

## 2025-01-01 PROCEDURE — 250N000013 HC RX MED GY IP 250 OP 250 PS 637: Performed by: INTERNAL MEDICINE

## 2025-01-01 RX ADMIN — ONDANSETRON 4 MG: 2 INJECTION INTRAMUSCULAR; INTRAVENOUS at 12:07

## 2025-01-01 RX ADMIN — CITALOPRAM HYDROBROMIDE 20 MG: 20 TABLET ORAL at 09:14

## 2025-01-01 RX ADMIN — PANTOPRAZOLE SODIUM 40 MG: 40 TABLET, DELAYED RELEASE ORAL at 09:14

## 2025-01-01 RX ADMIN — GABAPENTIN 100 MG: 100 CAPSULE ORAL at 19:44

## 2025-01-01 RX ADMIN — METHOCARBAMOL 500 MG: 500 TABLET ORAL at 19:44

## 2025-01-01 RX ADMIN — ACETAMINOPHEN 975 MG: 325 TABLET ORAL at 19:43

## 2025-01-01 RX ADMIN — FERROUS SULFATE TAB 325 MG (65 MG ELEMENTAL FE) 325 MG: 325 (65 FE) TAB at 09:14

## 2025-01-01 RX ADMIN — GABAPENTIN 100 MG: 100 CAPSULE ORAL at 09:14

## 2025-01-01 RX ADMIN — ACETAMINOPHEN 975 MG: 325 TABLET ORAL at 05:54

## 2025-01-01 RX ADMIN — METHOCARBAMOL 500 MG: 500 TABLET ORAL at 09:14

## 2025-01-01 RX ADMIN — OXYCODONE 5 MG: 5 TABLET ORAL at 10:07

## 2025-01-01 RX ADMIN — THERA TABS 1 TABLET: TAB at 09:14

## 2025-01-01 RX ADMIN — FUROSEMIDE 20 MG: 20 TABLET ORAL at 09:14

## 2025-01-01 RX ADMIN — ONDANSETRON 4 MG: 2 INJECTION INTRAMUSCULAR; INTRAVENOUS at 05:48

## 2025-01-01 ASSESSMENT — ACTIVITIES OF DAILY LIVING (ADL)
ADLS_ACUITY_SCORE: 37
ADLS_ACUITY_SCORE: 38
ADLS_ACUITY_SCORE: 37
ADLS_ACUITY_SCORE: 38
ADLS_ACUITY_SCORE: 36
ADLS_ACUITY_SCORE: 38
ADLS_ACUITY_SCORE: 37
ADLS_ACUITY_SCORE: 36
ADLS_ACUITY_SCORE: 37
ADLS_ACUITY_SCORE: 37
ADLS_ACUITY_SCORE: 38
ADLS_ACUITY_SCORE: 38
ADLS_ACUITY_SCORE: 37
ADLS_ACUITY_SCORE: 38
ADLS_ACUITY_SCORE: 36
ADLS_ACUITY_SCORE: 38
ADLS_ACUITY_SCORE: 38
ADLS_ACUITY_SCORE: 36
ADLS_ACUITY_SCORE: 37

## 2025-01-01 NOTE — PROVIDER NOTIFICATION
Notified evening cross cover Admatha Golva at 9:10pm :    Hi there! Pt is here POD #1 from an I&D on the lumbar spine. Takes 12.5 mg metoprolol ER at bedtime for HTN. When I went to give tonight, pulse was 58 (and bp was 120/57). Would you be able to add parameters for this medication please? Thank you! Yoly #[id number]    Orders: Hold if HR less than 60

## 2025-01-01 NOTE — PLAN OF CARE
Problem: Spinal Surgery  Goal: Optimal Functional Ability  Outcome: Not Progressing  Intervention: Optimize Functional Status  Recent Flowsheet Documentation  Taken 1/1/2025 1518 by Clarisa Rossi, RN  Positioning/Transfer Devices:   pillows   in use  Taken 1/1/2025 1500 by Clarisa Rossi, RN  Activity Management: standing at bedside  Taken 1/1/2025 0820 by Clarisa Rossi, RN  Activity Management: bedrest     Problem: Spinal Surgery  Goal: Effective Urinary Elimination  Outcome: Not Progressing   Goal Outcome Evaluation:    Patient vital signs are at baseline: Yes  Patient able to ambulate as they were prior to admission or with assist devices provided by therapies during their stay:  No,  Reason:  Up Ax2, patient did not feel comfortable ambulating or doing more than standing at bedside for a few seconds   Patient MUST void prior to discharge:  No,  Reason:  carvalho in place, not ambulating  Patient able to tolerate oral intake:  Yes- intermittent nausea. Tolerating fulls, encouraged intake, poor intake with breakfast and lunch  Pain has adequate pain control using Oral analgesics:  Yes  Does patient have an identified :  Yes  Has goal D/C date and time been discussed with patient:  Yes

## 2025-01-01 NOTE — PROGRESS NOTES
[facility]    Medicine Progress Note - Hospitalist Service    Date of Admission:  12/27/2024    Assessment & Plan   Pam Chino is a 79 year old female who underwent L4-5 decompression surgery for chronic severe spinal stenosis on 12/2/2024 with complication of dural tear, admitted on 12/27/2024 for drainage from surgical wound and headache.  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  Headache improved.  Continuing bedrest strict until 1/1/25.     Status post L4-L5 decompression surgery  Status post repair of spinal fluid leak/pseudomeningocele.   Right lower extremity pain, foot drop  US right lower extremity negative for DVT.   - bedrest for 48 hours per orthop spin recommendations. Finished 1/1/25 at noon.   - continue acetaminophen-caffeine 500-65 mg 2 tablets every 6 hours as needed for headache  - continue postoperative pain management.   Appreciate [place] orthopedic spine consultation     Depression and anxiety  PTA medication: Celexa home dose     Essential hypertension  PTA medication: Furosemide 20 mg daily, metoprolol 12.5 mg at bedtime    Normocytic anemia  Possible iron deficiency anemia.   Reticulocyte level pending.   Low iron  and low normal ferritin at 36. Vitamin B12 level normal.   Likely iron deficiency anemia.   Hemoglobin down to 8.8 g/dL.  - No indication for transfusion at this time.  - start iron sulfate 325 mg po every other day.   - monitor anemia per PCP, consider age appropriate screening.      GERD  PTA medication: Omeprazole 20 mg daily          Diet: Discharge Instruction - Regular Diet Adult  Advance Diet as Tolerated: Full Liquid Diet    DVT Prophylaxis: Defer to Ortho Spine   Mejia Catheter: PRESENT, indication: Surgical procedure  Lines: None     Cardiac Monitoring: None  Code Status: Full Code      Clinically Significant Risk Factors               # Hypoalbuminemia: Lowest albumin = 3.2 g/dL at 12/28/2024  6:56 AM, will  monitor as appropriate     # Hypertension: Noted on problem list                # Financial/Environmental Concerns: none         Social Drivers of Health    Housing Stability: Low Risk  (12/30/2024)    Housing Stability     Do you have housing? : Yes     Are you worried about losing your housing?: No   Recent Concern: Housing Stability - High Risk (12/2/2024)    Housing Stability     Do you have housing? : No     Are you worried about losing your housing?: No   Tobacco Use: Medium Risk (12/2/2024)    Patient History     Smoking Tobacco Use: Former     Smokeless Tobacco Use: Never     Passive Exposure: Past          Disposition Plan     Medically Ready for Discharge: Anticipated Tomorrow             Bernard Ortiz DO  Hospitalist Service  [facility]  Securely message with [employer] (more info)  Text page via [employer] Paging/Directory   ______________________________________________________________________    Interval History   Looking forward to 12 pm and off of bedrest restrictions.  Denies fever or chills.  Reports right lower leg pain, foot weakness unchanged from 12/31/24.  Very mild headache currently.     Physical Exam   Vital Signs: Temp: 98.1  F (36.7  C) Temp src: Oral BP: 139/73 Pulse: 67   Resp: 16 SpO2: 90 % O2 Device: None (Room air)    Weight: 134 lbs .63 oz    General: Appears in no acute distress, awake, alert, interactive.    Eyes: Conjunctivae non-injected. Sclera anicteric.  HENT: Atraumatic.  Neck: Supple.  Respiratory/Chest: Respiration unlabored.  Abdomen: non distended  Musculoskeletal: Normal extremities. No edema or erythema.  Skin: Normal color. No rash or diaphoresis. Soft tissue swelling around low back lumbar incision.  No erythema.    Neurologic: Face symmetric, moves all extremities spontaneously. Speech clear.  Psychiatric: Oriented to person, place, and time. Affect appropriate.    Medical Decision Making       40 MINUTES SPENT BY ME on the date of service  doing chart review, history, exam, documentation & further activities per the note.      Data         Imaging results reviewed over the past 24 hrs:   No results found for this or any previous visit (from the past 24 hours).

## 2025-01-01 NOTE — PROGRESS NOTES
"Orthopedic Progress Note      Assessment: 1 Day Post-Op  S/P Procedure(s):  INCISION AND DRAINAGE, LUMBAR SPINE  Lumbar 3-4 laminotomy, Lumbar 4-5 revision laminotomy @    Plan:   - Continue PT/OT  - Weightbearing status: WBAT can use brace for comfort  - No bending, twisting or lifting more than 10 pounds for 6 weeks.  - Anticoagulation: no chemical prophylaxis in addition to SCDs, madhu stockings and early ambulation.  - Orthosis consult placed for AFO brace. Foot drop noted after surgery. Spoke with the surgeon today and noted that there was a ton of work done around the nerve to repair the dural tear. There is a high likelihood she may have a foot drop for months or indefinitely   - Can come off of bed rest at noon today 1/1/25. Start with raising head of bed from flat to 45 degrees for 2 hours. Then advance every 30-45 minutes another 15 degrees until sitting up. If tolerates sitting up can trial moving to the chair and ambulation. If at any point in time they develop a headache return to bed and back to flat bed rest for another 12-24 hours.   - Once up past 45 degrees she needs to move VERY VERY SLOWLY LIKE A SLOTH.  - PT to see today to work on bedrest maneuvers   - Discharge planning:  progression in therapy and off bed rest    Subjective:  Pain: minimal  Chest pain, SOB: no  Nausea, Vomiting:  no  Lightheadedness, Dizziness:  no  Neuro:  Patient denies new onset numbness or paresthesias    Patient is doing well this morning notes that her foot drop is still present. I educated her that she will likely have one for awhile just due to the amount of work done around the nerve. Currently on bedrest. Doing well with no new nerve pain down the leg or numbness or tingling.    Objective:  /59 (BP Location: Right arm)   Pulse 77   Temp 98.5  F (36.9  C) (Oral)   Resp 16   Ht 1.651 m (5' 5\")   Wt 60.8 kg (134 lb 0.6 oz)   LMP  (LMP Unknown)   SpO2 94%   BMI 22.31 kg/m    The patient is A&Ox3. Appears " comfortable.   Sensation is intact.   plantar flexion is intact.  Dorsalis pedis pulse intact.  Calves are soft and non-tender. Negative Josefa's.  The incision is covered. Dressing C/D/I.    Pertinent Labs   Lab Results: personally reviewed.   Lab Results   Component Value Date    INR 1.04 12/28/2024     Lab Results   Component Value Date    WBC 4.1 12/28/2024    HGB 8.8 (L) 12/28/2024    HCT 27.6 (L) 12/28/2024    MCV 92 12/28/2024     12/28/2024     Lab Results   Component Value Date     12/28/2024    CO2 28 12/28/2024         Report completed by:  Bipin Kasper PA-C  [facility]    Date: 1/1/25

## 2025-01-01 NOTE — PROGRESS NOTES
Care Management Follow Up    Length of Stay (days): 5    Expected Discharge Date: 01/02/2025     Concerns to be Addressed: all concerns addressed in this encounter     Patient plan of care discussed at interdisciplinary rounds: Yes    Anticipated Discharge Disposition:  TCU VS Home with Home Care               Anticipated Discharge Services:  TBD   Anticipated Discharge DME:  TBD     Patient/family educated on Medicare website which has current facility and service quality ratings: yes  Education Provided on the Discharge Plan:  Yes   Patient/Family in Agreement with the Plan: yes    Referrals Placed by CM/SW:  Home Care   Private pay costs discussed: Not applicable at this time     Discussed  Partnership in Safe Discharge Planning  document with patient/family: Yes:      Handoff Completed: No, handoff not indicated or clinically appropriate    Additional Information:  SWCM met and introduced self.  Pt is currently on bed rest.  TCU is recommended currently and may change once Pt is able to move more.  If, TCU is recommended, Pt would like [facility].  Pt is open to HC already for OT only.  [facility].     Next Steps: See when of bed rest.     LUIS Syed

## 2025-01-01 NOTE — PLAN OF CARE
Problem: Pain Acute  Goal: Optimal Pain Control and Function  Outcome: Progressing  Intervention: Develop Pain Management Plan  Recent Flowsheet Documentation  Taken 12/31/2024 2057 by Yoly Barahona RN  Pain Management Interventions:   medication (see MAR)   rest   quiet environment facilitated   distraction  Intervention: Prevent or Manage Pain  Recent Flowsheet Documentation  Taken 12/31/2024 2315 by Yoly Barahona RN  Medication Review/Management: medications reviewed  Taken 12/31/2024 1926 by Yoly Barahona RN  Medication Review/Management: medications reviewed   Goal Outcome Evaluation:    Patient vital signs are at baseline: Yes, was slightly bradycardic   Patient able to ambulate as they were prior to admission or with assist devices provided by therapies during their stay:  No,  Reason:  bedrest until 1200 today   Patient MUST void prior to discharge:  No,  Reason:  carvalho until able to ambulate   Patient able to tolerate oral intake:  Yes, tolerating full liquid diet   Pain has adequate pain control using Oral analgesics:  Yes  Does patient have an identified :  Yes  Has goal D/C date and time been discussed with patient:  Yes          Pain managed with prn oxycodone and scheduled tylenol. Had some nausea this AM that was resolved with prn IV zofran, sips of water, cold cloth, and decreased stimuli.

## 2025-01-02 ENCOUNTER — APPOINTMENT (OUTPATIENT)
Dept: OCCUPATIONAL THERAPY | Facility: CLINIC | Age: 80
End: 2025-01-02
Payer: COMMERCIAL

## 2025-01-02 VITALS
HEART RATE: 84 BPM | DIASTOLIC BLOOD PRESSURE: 56 MMHG | OXYGEN SATURATION: 90 % | TEMPERATURE: 98.4 F | RESPIRATION RATE: 20 BRPM | HEIGHT: 65 IN | WEIGHT: 134.04 LBS | SYSTOLIC BLOOD PRESSURE: 119 MMHG | BODY MASS INDEX: 22.33 KG/M2

## 2025-01-02 LAB
ANION GAP SERPL CALCULATED.3IONS-SCNC: 19 MMOL/L (ref 7–15)
BACTERIA TISS BX CULT: NORMAL
BACTERIA TISS BX CULT: NORMAL
BASOPHILS # BLD AUTO: 0 10E3/UL (ref 0–0.2)
BASOPHILS NFR BLD AUTO: 0 %
BUN SERPL-MCNC: 18.6 MG/DL (ref 8–23)
CALCIUM SERPL-MCNC: 9.3 MG/DL (ref 8.8–10.4)
CHLORIDE SERPL-SCNC: 95 MMOL/L (ref 98–107)
CREAT SERPL-MCNC: 1.14 MG/DL (ref 0.51–0.95)
EGFRCR SERPLBLD CKD-EPI 2021: 49 ML/MIN/1.73M2
EOSINOPHIL # BLD AUTO: 0.2 10E3/UL (ref 0–0.7)
EOSINOPHIL NFR BLD AUTO: 3 %
ERYTHROCYTE [DISTWIDTH] IN BLOOD BY AUTOMATED COUNT: 13.5 % (ref 10–15)
GLUCOSE SERPL-MCNC: 72 MG/DL (ref 70–99)
HCO3 SERPL-SCNC: 26 MMOL/L (ref 22–29)
HCT VFR BLD AUTO: 29.5 % (ref 35–47)
HGB BLD-MCNC: 9.5 G/DL (ref 11.7–15.7)
IMM GRANULOCYTES # BLD: 0 10E3/UL
IMM GRANULOCYTES NFR BLD: 0 %
LYMPHOCYTES # BLD AUTO: 1.2 10E3/UL (ref 0.8–5.3)
LYMPHOCYTES NFR BLD AUTO: 24 %
MCH RBC QN AUTO: 28.7 PG (ref 26.5–33)
MCHC RBC AUTO-ENTMCNC: 32.2 G/DL (ref 31.5–36.5)
MCV RBC AUTO: 89 FL (ref 78–100)
MONOCYTES # BLD AUTO: 0.6 10E3/UL (ref 0–1.3)
MONOCYTES NFR BLD AUTO: 12 %
NEUTROPHILS # BLD AUTO: 3 10E3/UL (ref 1.6–8.3)
NEUTROPHILS NFR BLD AUTO: 60 %
NRBC # BLD AUTO: 0 10E3/UL
NRBC BLD AUTO-RTO: 0 /100
PLAT MORPH BLD: ABNORMAL
PLATELET # BLD AUTO: 241 10E3/UL (ref 150–450)
POTASSIUM SERPL-SCNC: 3.9 MMOL/L (ref 3.4–5.3)
RBC # BLD AUTO: 3.31 10E6/UL (ref 3.8–5.2)
RBC MORPH BLD: ABNORMAL
SODIUM SERPL-SCNC: 140 MMOL/L (ref 135–145)
VARIANT LYMPHS BLD QL SMEAR: PRESENT
WBC # BLD AUTO: 5.1 10E3/UL (ref 4–11)

## 2025-01-02 PROCEDURE — 250N000013 HC RX MED GY IP 250 OP 250 PS 637

## 2025-01-02 PROCEDURE — 36415 COLL VENOUS BLD VENIPUNCTURE: CPT | Performed by: INTERNAL MEDICINE

## 2025-01-02 PROCEDURE — 120N000001 HC R&B MED SURG/OB

## 2025-01-02 PROCEDURE — 250N000013 HC RX MED GY IP 250 OP 250 PS 637: Performed by: INTERNAL MEDICINE

## 2025-01-02 PROCEDURE — 85004 AUTOMATED DIFF WBC COUNT: CPT | Performed by: INTERNAL MEDICINE

## 2025-01-02 PROCEDURE — 80048 BASIC METABOLIC PNL TOTAL CA: CPT | Performed by: INTERNAL MEDICINE

## 2025-01-02 PROCEDURE — 250N000013 HC RX MED GY IP 250 OP 250 PS 637: Performed by: PHYSICIAN ASSISTANT

## 2025-01-02 PROCEDURE — 85014 HEMATOCRIT: CPT | Performed by: INTERNAL MEDICINE

## 2025-01-02 PROCEDURE — 99232 SBSQ HOSP IP/OBS MODERATE 35: CPT | Performed by: INTERNAL MEDICINE

## 2025-01-02 PROCEDURE — 258N000003 HC RX IP 258 OP 636: Performed by: INTERNAL MEDICINE

## 2025-01-02 PROCEDURE — 97535 SELF CARE MNGMENT TRAINING: CPT | Mod: GO

## 2025-01-02 PROCEDURE — 250N000013 HC RX MED GY IP 250 OP 250 PS 637: Performed by: HOSPITALIST

## 2025-01-02 RX ORDER — FERROUS SULFATE 325(65) MG
325 TABLET ORAL EVERY OTHER DAY
Status: DISCONTINUED | OUTPATIENT
Start: 2025-01-03 | End: 2025-01-06 | Stop reason: HOSPADM

## 2025-01-02 RX ADMIN — GABAPENTIN 100 MG: 100 CAPSULE ORAL at 10:03

## 2025-01-02 RX ADMIN — POLYETHYLENE GLYCOL 3350 17 G: 17 POWDER, FOR SOLUTION ORAL at 10:02

## 2025-01-02 RX ADMIN — ACETAMINOPHEN 975 MG: 325 TABLET ORAL at 05:34

## 2025-01-02 RX ADMIN — METHOCARBAMOL 500 MG: 500 TABLET ORAL at 10:05

## 2025-01-02 RX ADMIN — METOPROLOL SUCCINATE ER TABLETS 12.5 MG: 25 TABLET, FILM COATED, EXTENDED RELEASE ORAL at 22:36

## 2025-01-02 RX ADMIN — PANTOPRAZOLE SODIUM 40 MG: 40 TABLET, DELAYED RELEASE ORAL at 10:05

## 2025-01-02 RX ADMIN — METHOCARBAMOL 500 MG: 500 TABLET ORAL at 14:43

## 2025-01-02 RX ADMIN — SODIUM CHLORIDE, POTASSIUM CHLORIDE, SODIUM LACTATE AND CALCIUM CHLORIDE 1000 ML: 600; 310; 30; 20 INJECTION, SOLUTION INTRAVENOUS at 00:40

## 2025-01-02 RX ADMIN — OXYCODONE 5 MG: 5 TABLET ORAL at 10:04

## 2025-01-02 RX ADMIN — SENNOSIDES AND DOCUSATE SODIUM 1 TABLET: 50; 8.6 TABLET ORAL at 10:05

## 2025-01-02 RX ADMIN — CITALOPRAM HYDROBROMIDE 20 MG: 20 TABLET ORAL at 10:03

## 2025-01-02 RX ADMIN — THERA TABS 1 TABLET: TAB at 10:05

## 2025-01-02 RX ADMIN — METHOCARBAMOL 500 MG: 500 TABLET ORAL at 20:23

## 2025-01-02 RX ADMIN — GABAPENTIN 100 MG: 100 CAPSULE ORAL at 14:43

## 2025-01-02 RX ADMIN — ACETAMINOPHEN 650 MG: 325 TABLET ORAL at 18:35

## 2025-01-02 RX ADMIN — GABAPENTIN 100 MG: 100 CAPSULE ORAL at 20:23

## 2025-01-02 ASSESSMENT — ACTIVITIES OF DAILY LIVING (ADL)
ADLS_ACUITY_SCORE: 39
ADLS_ACUITY_SCORE: 41
ADLS_ACUITY_SCORE: 39
ADLS_ACUITY_SCORE: 39
ADLS_ACUITY_SCORE: 41
ADLS_ACUITY_SCORE: 38
ADLS_ACUITY_SCORE: 39
ADLS_ACUITY_SCORE: 41
ADLS_ACUITY_SCORE: 39
ADLS_ACUITY_SCORE: 41
ADLS_ACUITY_SCORE: 41
ADLS_ACUITY_SCORE: 39
ADLS_ACUITY_SCORE: 39
ADLS_ACUITY_SCORE: 43
ADLS_ACUITY_SCORE: 41
ADLS_ACUITY_SCORE: 38
ADLS_ACUITY_SCORE: 39
ADLS_ACUITY_SCORE: 38
ADLS_ACUITY_SCORE: 41
ADLS_ACUITY_SCORE: 39
ADLS_ACUITY_SCORE: 39

## 2025-01-02 NOTE — PROGRESS NOTES
[facility]    Medicine Progress Note - Hospitalist Service    Date of Admission:  12/27/2024    Assessment & Plan   Pam Chino is a 79 year old female who underwent L4-5 decompression surgery for chronic severe spinal stenosis on 12/2/2024 with complication of dural tear, admitted on 12/27/2024 for drainage from surgical wound and headache.  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  Headache improved.  Continuing bedrest strict until 1/1/25.     Status post L4-L5 decompression surgery  Status post repair of spinal fluid leak/pseudomeningocele.   Right foot drop  US right lower extremity negative for DVT.   - bedrest finished 1/1/25 at noon.   - continue acetaminophen-caffeine 500-65 mg 2 tablets every 6 hours as needed for headache  - continue postoperative pain management.   Appreciate [place] orthopedic spine consultation     Depression and anxiety  PTA medication: Celexa home dose     Essential hypertension  Elevated creatinine.   Creatinine increased to 1.14 from 0.85  Hold furosemide.   PTA medication: hold Furosemide 20 mg daily, continue metoprolol 12.5 mg at bedtime    Normocytic anemia  Possible iron deficiency anemia.   Reticulocyte level 2.3%  Low iron  and low normal ferritin at 36. Vitamin B12 level normal.   Likely iron deficiency anemia.   Hemoglobin down to 8.8 g/dL.  - No indication for transfusion at this time.  - iron sulfate 325 mg po every other day.   - monitor anemia per PCP, consider age appropriate screening.      GERD  PTA medication: Omeprazole 20 mg daily          Diet: Discharge Instruction - Regular Diet Adult  Advance Diet as Tolerated: Regular Diet Adult    DVT Prophylaxis: Defer to orthopedic spine.  PCD  Mejia Catheter: PRESENT, indication: Surgical procedure  Lines: None     Cardiac Monitoring: None  Code Status: Full Code      Clinically Significant Risk Factors          # Hypochloremia: Lowest Cl = 95 mmol/L in last 2  days, will monitor as appropriate     # Anion Gap Metabolic Acidosis: Highest Anion Gap = 19 mmol/L in last 2 days, will monitor and treat as appropriate  # Hypoalbuminemia: Lowest albumin = 3.2 g/dL at 12/28/2024  6:56 AM, will monitor as appropriate    # Acute Kidney Injury, unspecified: based on a >150% or 0.3 mg/dL increase in last creatinine compared to past 90 day average, will monitor renal function  # Hypertension: Noted on problem list                # Financial/Environmental Concerns: none         Social Drivers of Health    Housing Stability: Low Risk  (12/30/2024)    Housing Stability     Do you have housing? : Yes     Are you worried about losing your housing?: No   Recent Concern: Housing Stability - High Risk (12/2/2024)    Housing Stability     Do you have housing? : No     Are you worried about losing your housing?: No   Tobacco Use: Medium Risk (12/2/2024)    Patient History     Smoking Tobacco Use: Former     Smokeless Tobacco Use: Never     Passive Exposure: Past          Disposition Plan     Medically Ready for Discharge: Anticipated Tomorrow             Bernard Ortiz DO  Hospitalist Service  [facility]  Securely message with [employer] (more info)  Text page via [employer] Paging/Directory   ______________________________________________________________________    Interval History   No new complaints. Continues to have paraesthesias, numbness and dorsiflexion weakness of right leg/foot.  She denies headache.     Physical Exam   Vital Signs: Temp: 97.5  F (36.4  C) Temp src: Oral BP: 120/58 Pulse: 78   Resp: 16 SpO2: 93 % O2 Device: None (Room air)    Weight: 134 lbs .63 oz    General: Appears in no acute distress, awake, alert, interactive.    Eyes: Conjunctivae non-injected. Sclera anicteric.  HENT: Atraumatic.  Neck: Supple.  Respiratory/Chest: Respiration unlabored.  Abdomen: non distended  Musculoskeletal: Normal extremities. No edema or erythema.  Skin: Normal color.  No rash or diaphoresis. Soft tissue swelling around low back lumbar incision.  No erythema.    Neurologic: Face symmetric.  Right foot dorsiflexion weakness unchanged.  Diminished sensation right lower extremity. Speech clear.  Psychiatric: Oriented to person, place, and time. Affect appropriate.    Medical Decision Making       40 MINUTES SPENT BY ME on the date of service doing chart review, history, exam, documentation & further activities per the note.      Data     I have personally reviewed the following data over the past 24 hrs:    5.1  \   9.5 (L)   / 241     140 95 (L) 18.6 /  72   3.9 26 1.14 (H) \       Imaging results reviewed over the past 24 hrs:   No results found for this or any previous visit (from the past 24 hours).

## 2025-01-02 NOTE — PLAN OF CARE
Problem: Pain Acute  Goal: Optimal Pain Control and Function  Outcome: Progressing  Intervention: Prevent or Manage Pain  Recent Flowsheet Documentation  Taken 1/1/2025 1945 by Dyana Ventura RN  Medication Review/Management: medications reviewed     Patient vital signs are at baseline: No,  Reason: BP soft, patient asymptomatic, see Dr. Monte previous note for details. Improved following IVF bolus.   Patient able to ambulate as they were prior to admission or with assist devices provided by therapies during their stay:  No,  Reason: patient not OOB this shift, feeling weak however shifting weight well in bed.   Patient MUST void prior to discharge:  No,  Reason: Mejia remains in place d/t patient not ambulating.   Patient able to tolerate oral intake:  Yes. Improved nausea this shift however still poor appetite.   Pain has adequate pain control using Oral analgesics:  Yes. Well controlled with scheduled medication.   Does patient have an identified :  Yes  Has goal D/C date and time been discussed with patient:  Yes

## 2025-01-02 NOTE — SIGNIFICANT EVENT
Significant Event Note    Time of event: 12:33 AM January 2, 2025    Description of event:  I was notified by nursing staff that patient's blood pressure was 81/51 in the left arm and 84/49 in the right arm.  Chart briefly reviewed.    Plan:  1 L bolus of LR ordered.  Checking CBC and CMP.    Discussed with: bedside nurse    Hemanth Monte MD

## 2025-01-02 NOTE — PROGRESS NOTES
S: Pt is a 79 yof seen today at [facility], room 3110, for evaluation and delivery of an off the shelf AFO for her right lower extremity, ordered by Zayra Caballero PA-C. Pt had lumbar spine surgery on 12/30/24 and has been experiencing a right foot drop since this surgery.  DX:  Right drop foot  O: 5 5 . 134 lbs. Shoe size: 7.5.  A: Pt fit with a size small off the shelf [place] Walk-on AFO without incident. Footplate was trimmed to fit pt s shoe. Pt was educated on donning/doffing and wear/care.  s written instructions were provided.  P: All questions were answered at this time. Pt will follow-up as needed.  G: Reduce drop foot to prevent falls.    Electronically signed by Yesenia Thompson CPO, ANDERSONO

## 2025-01-02 NOTE — PLAN OF CARE
Problem: Spinal Surgery  Goal: Effective Bowel Elimination  Outcome: Not Progressing  Intervention: Enhance Bowel Motility and Elimination  Recent Flowsheet Documentation  Taken 1/2/2025 1017 by Harper Staton, RN  Bowel Motility Enhancement:   ambulation promoted   fluid intake encouraged   oral intake encouraged     Problem: Spinal Surgery  Goal: Effective Urinary Elimination  Outcome: Unable to Meet   Goal Outcome Evaluation:  Mejia removed at 1450, due to void.     See therapy note from today regarding pt progress.     Pain controlled by po pain meds, ice, repositioning as evidence by pain 2-3/10.     See PA not regarding movement.

## 2025-01-02 NOTE — PROGRESS NOTES
"Orthopedic Progress Note      Assessment: 3 Day Post-Op  S/P Procedure(s):  INCISION AND DRAINAGE, LUMBAR SPINE  Lumbar 3-4 laminotomy, Lumbar 4-5 revision laminotomy @    Plan:   - Continue PT/OT  - Weightbearing status: WBAT can use brace for comfort  - No bending, twisting or lifting more than 10 pounds for 6 weeks.  - Anticoagulation: no chemical prophylaxis in addition to SCDs, madhu stockings and early ambulation.  - Orthosis consult placed for AFO brace. Foot drop noted after surgery. Spoke with the surgeon today and noted that there was a ton of work done around the nerve to repair the dural tear. There is a high likelihood she may have a foot drop for months or indefinitely   - Off bed rest now, with no headaches   - Once up past 45 degrees she needs to move VERY VERY SLOWLY LIKE A SLOTH.  - pull carvalho today  - Discharge planning:  progression in therapy     Subjective:  Pain: minimal  Chest pain, SOB: no  Nausea, Vomiting:  no  Lightheadedness, Dizziness:  no  Neuro:  Patient denies new onset numbness or paresthesias    Patient is doing well this morning notes that her foot drop is still present. I educated her that she will likely have one for awhile just due to the amount of work done around the nerve. Currently on bedrest. Doing well with no new nerve pain down the leg or numbness or tingling.    Objective:  /66 (BP Location: Right arm)   Pulse 73   Temp 98.5  F (36.9  C) (Oral)   Resp 16   Ht 1.651 m (5' 5\")   Wt 60.8 kg (134 lb 0.6 oz)   LMP  (LMP Unknown)   SpO2 92%   BMI 22.31 kg/m    The patient is A&Ox3. Appears comfortable.   Sensation is intact.   plantar flexion is intact.  Dorsalis pedis pulse intact.  Calves are soft and non-tender. Negative Josefa's.  The incision is covered. Dressing C/D/I.    Pertinent Labs   Lab Results: personally reviewed.   Lab Results   Component Value Date    INR 1.04 12/28/2024     Lab Results   Component Value Date    WBC 5.1 01/02/2025    HGB 9.5 (L) " 01/02/2025    HCT 29.5 (L) 01/02/2025    MCV 89 01/02/2025     01/02/2025     Lab Results   Component Value Date     01/02/2025    CO2 26 01/02/2025         Report completed by:  01/02/25   [facility]  01/02/25

## 2025-01-02 NOTE — PROGRESS NOTES
BRIEF SOCIAL WORK NOTE:    1316: SW received call from Dariela at [facility]. They are able to accept patient on 1/3 around 1500/1530 in the afternoon. They do not need a PAS completed.     Joyce Bose, LICSW

## 2025-01-02 NOTE — PROGRESS NOTES
Care Management Follow Up    Length of Stay (days): 6    Expected Discharge Date: 01/03/2025     Concerns to be Addressed: all concerns addressed in this encounter     Patient plan of care discussed at interdisciplinary rounds: Yes    Anticipated Discharge Disposition: Transitional Care              Anticipated Discharge Services: TCU   Anticipated Discharge DME: None    Patient/family educated on Medicare website which has current facility and service quality ratings: yes  Education Provided on the Discharge Plan: Yes  Patient/Family in Agreement with the Plan: yes    Referrals Placed by CM/SW: Post Acute Facilities, Homecare  Private pay costs discussed: Not applicable at this time     Discussed  Partnership in Safe Discharge Planning  document with patient/family: Yes:      Handoff Completed: No, handoff not indicated or clinically appropriate    Additional Information:  TCU recommended by therapy.  Pt wanted TCU referral sent to [facility] as her Dtr is CM there.  SWCM sent referral and called and spoke with Dariela in Admissions and will see if they can make a bed for Pt for tomorrow or maybe Monday.      Next Steps: PAS needed.  Transport TBD     LUIS Syed

## 2025-01-03 ENCOUNTER — APPOINTMENT (OUTPATIENT)
Dept: OCCUPATIONAL THERAPY | Facility: CLINIC | Age: 80
End: 2025-01-03
Payer: COMMERCIAL

## 2025-01-03 ENCOUNTER — APPOINTMENT (OUTPATIENT)
Dept: PHYSICAL THERAPY | Facility: CLINIC | Age: 80
End: 2025-01-03
Payer: COMMERCIAL

## 2025-01-03 LAB
ANION GAP SERPL CALCULATED.3IONS-SCNC: 11 MMOL/L (ref 7–15)
BUN SERPL-MCNC: 11 MG/DL (ref 8–23)
CALCIUM SERPL-MCNC: 8.7 MG/DL (ref 8.8–10.4)
CHLORIDE SERPL-SCNC: 97 MMOL/L (ref 98–107)
CREAT SERPL-MCNC: 0.7 MG/DL (ref 0.51–0.95)
EGFRCR SERPLBLD CKD-EPI 2021: 87 ML/MIN/1.73M2
GLUCOSE SERPL-MCNC: 99 MG/DL (ref 70–99)
HCO3 SERPL-SCNC: 31 MMOL/L (ref 22–29)
HOLD SPECIMEN: NORMAL
POTASSIUM SERPL-SCNC: 3.5 MMOL/L (ref 3.4–5.3)
SODIUM SERPL-SCNC: 139 MMOL/L (ref 135–145)

## 2025-01-03 PROCEDURE — 250N000013 HC RX MED GY IP 250 OP 250 PS 637: Performed by: PHYSICIAN ASSISTANT

## 2025-01-03 PROCEDURE — 97116 GAIT TRAINING THERAPY: CPT | Mod: GP

## 2025-01-03 PROCEDURE — 250N000013 HC RX MED GY IP 250 OP 250 PS 637: Performed by: HOSPITALIST

## 2025-01-03 PROCEDURE — 250N000013 HC RX MED GY IP 250 OP 250 PS 637: Performed by: INTERNAL MEDICINE

## 2025-01-03 PROCEDURE — 97535 SELF CARE MNGMENT TRAINING: CPT | Mod: GO

## 2025-01-03 PROCEDURE — 36415 COLL VENOUS BLD VENIPUNCTURE: CPT | Performed by: HOSPITALIST

## 2025-01-03 PROCEDURE — 99232 SBSQ HOSP IP/OBS MODERATE 35: CPT | Performed by: HOSPITALIST

## 2025-01-03 PROCEDURE — 250N000013 HC RX MED GY IP 250 OP 250 PS 637

## 2025-01-03 PROCEDURE — 97110 THERAPEUTIC EXERCISES: CPT | Mod: GP

## 2025-01-03 PROCEDURE — 80048 BASIC METABOLIC PNL TOTAL CA: CPT | Performed by: HOSPITALIST

## 2025-01-03 PROCEDURE — 82435 ASSAY OF BLOOD CHLORIDE: CPT | Performed by: HOSPITALIST

## 2025-01-03 PROCEDURE — 120N000001 HC R&B MED SURG/OB

## 2025-01-03 RX ORDER — ACETAMINOPHEN 325 MG/1
650 TABLET ORAL EVERY 8 HOURS PRN
COMMUNITY
Start: 2025-01-03

## 2025-01-03 RX ORDER — OXYCODONE HYDROCHLORIDE 5 MG/1
5 TABLET ORAL EVERY 4 HOURS PRN
Qty: 20 TABLET | Refills: 0 | Status: SHIPPED | OUTPATIENT
Start: 2025-01-03

## 2025-01-03 RX ORDER — BISACODYL 10 MG
10 SUPPOSITORY, RECTAL RECTAL ONCE
Status: COMPLETED | OUTPATIENT
Start: 2025-01-03 | End: 2025-01-03

## 2025-01-03 RX ORDER — POLYETHYLENE GLYCOL 3350 17 G/17G
17 POWDER, FOR SOLUTION ORAL 2 TIMES DAILY
Status: DISCONTINUED | OUTPATIENT
Start: 2025-01-03 | End: 2025-01-04

## 2025-01-03 RX ADMIN — SENNOSIDES AND DOCUSATE SODIUM 1 TABLET: 50; 8.6 TABLET ORAL at 20:56

## 2025-01-03 RX ADMIN — THERA TABS 1 TABLET: TAB at 08:12

## 2025-01-03 RX ADMIN — GABAPENTIN 100 MG: 100 CAPSULE ORAL at 20:56

## 2025-01-03 RX ADMIN — METHOCARBAMOL 500 MG: 500 TABLET ORAL at 14:14

## 2025-01-03 RX ADMIN — METHOCARBAMOL 500 MG: 500 TABLET ORAL at 20:56

## 2025-01-03 RX ADMIN — POLYETHYLENE GLYCOL 3350 17 G: 17 POWDER, FOR SOLUTION ORAL at 08:13

## 2025-01-03 RX ADMIN — METHOCARBAMOL 500 MG: 500 TABLET ORAL at 08:12

## 2025-01-03 RX ADMIN — FERROUS SULFATE TAB 325 MG (65 MG ELEMENTAL FE) 325 MG: 325 (65 FE) TAB at 08:12

## 2025-01-03 RX ADMIN — PANTOPRAZOLE SODIUM 40 MG: 40 TABLET, DELAYED RELEASE ORAL at 08:12

## 2025-01-03 RX ADMIN — GABAPENTIN 100 MG: 100 CAPSULE ORAL at 08:12

## 2025-01-03 RX ADMIN — CITALOPRAM HYDROBROMIDE 20 MG: 20 TABLET ORAL at 08:12

## 2025-01-03 RX ADMIN — METOPROLOL SUCCINATE ER TABLETS 12.5 MG: 25 TABLET, FILM COATED, EXTENDED RELEASE ORAL at 21:00

## 2025-01-03 RX ADMIN — ACETAMINOPHEN 650 MG: 325 TABLET ORAL at 11:25

## 2025-01-03 RX ADMIN — GABAPENTIN 100 MG: 100 CAPSULE ORAL at 14:14

## 2025-01-03 RX ADMIN — SENNOSIDES AND DOCUSATE SODIUM 1 TABLET: 50; 8.6 TABLET ORAL at 08:12

## 2025-01-03 ASSESSMENT — ACTIVITIES OF DAILY LIVING (ADL)
ADLS_ACUITY_SCORE: 39
ADLS_ACUITY_SCORE: 43
ADLS_ACUITY_SCORE: 34
ADLS_ACUITY_SCORE: 40
ADLS_ACUITY_SCORE: 39
ADLS_ACUITY_SCORE: 34
ADLS_ACUITY_SCORE: 39
ADLS_ACUITY_SCORE: 40
ADLS_ACUITY_SCORE: 43
ADLS_ACUITY_SCORE: 39
ADLS_ACUITY_SCORE: 34
ADLS_ACUITY_SCORE: 39
ADLS_ACUITY_SCORE: 43

## 2025-01-03 NOTE — PROGRESS NOTES
Care Management Follow Up    Length of Stay (days): 7    Expected Discharge Date: 01/03/2025     Concerns to be Addressed: all concerns addressed in this encounter     Patient plan of care discussed at interdisciplinary rounds: Yes    Anticipated Discharge Disposition: Transitional Care              Anticipated Discharge Services: Home Care  Anticipated Discharge DME: None    Patient/family educated on Medicare website which has current facility and service quality ratings: yes  Education Provided on the Discharge Plan: Yes  Patient/Family in Agreement with the Plan: yes    Referrals Placed by CM/SW: Post Acute Facilities  Private pay costs discussed: transportation costs    Discussed  Partnership in Safe Discharge Planning  document with patient/family: Yes:      Handoff Completed: No, handoff not indicated or clinically appropriate    Additional Information:      JAMES met with Pt has [facility] may have bed later today and on Monday.  Pt is not sure if she is medically ready.  Pt's daughter Nannette called and wondered if Pt is discharging today.  Nannette states that pending the day and time of discharge, either family or  Health will transport and is aware of potential cost.     10:50 AM  JAMES talked again with Dtr and is hoping that Pt will be able to stay the weekend.  JAMES tried to call Dariela at TCU and comes in at 11:00 am today.     11:50 AM  Pt is not medically ready for discharge today.  JAMES updated Daughter. STEPHEN updated Dariela at [facility] and can admit on Monday.  No PAS needed.     LUIS Syed

## 2025-01-03 NOTE — PLAN OF CARE
Note from [id number]. Pt rated pain 2-4/10 during shift thus far. No new skin issues noted. Dressing changed to back, CDI. Full sensation per pt. Pt now has moderate dorsi/plantar movement in her RLE. SBA to assist of 1 with walker for transferring. Pt needs much encouragement to move and eat. Refused suppository. Saline locked. Voiding adequately, needs 1 more PVR. Education on medication administration and use of call-light to reduce risk for falls and injury. Alarms in place. No further issues noted. VSS, denies shortness of breath.    Kaylin Adame RN

## 2025-01-03 NOTE — PLAN OF CARE
Goal Outcome Evaluation:                        Problem: Adult Inpatient Plan of Care  Goal: Absence of Hospital-Acquired Illness or Injury  Intervention: Identify and Manage Fall Risk  Recent Flowsheet Documentation  Taken 1/2/2025 1600 by Star Hernández, RN  Safety Promotion/Fall Prevention:   activity supervised   clutter free environment maintained   lighting adjusted   mobility aid in reach   nonskid shoes/slippers when out of bed   patient and family education   room near nurse's station   room organization consistent   safety round/check completed   supervised activity     Problem: Adult Inpatient Plan of Care  Goal: Absence of Hospital-Acquired Illness or Injury  Intervention: Prevent Skin Injury  Recent Flowsheet Documentation  Taken 1/2/2025 1600 by Star Hernández, RN  Skin Protection:   adhesive use limited   tubing/devices free from skin contact   transparent dressing maintained    Pt alert and oriented vss on room air, can voice needs, pain meds given, she is assist of one to the bathroom, has a right foot drop. Discharge will depend on movement progress

## 2025-01-03 NOTE — PROGRESS NOTES
"CLINICAL NUTRITION SERVICES - ASSESSMENT NOTE    RECOMMENDATIONS FOR MDs/PROVIDERS TO ORDER:  Consider supplemental Vitamin C to aid iron absorption   Recommend adjust MVI to MVI/M   Recommend 100 mg thiamin x10 days with malnutrition     Malnutrition Status:    Moderate malnutrition in the context of acute illness or injury    Registered Dietitian Interventions:  Pt declined nutrition supplements/snacks at this time     Future/Additional Recommendations:  Monitor po intake, weight, labs, I/Os.      REASON FOR ASSESSMENT  LOS    HPI: Pt admitted for management of a dural tear following t L4-5 decompression surgery for chronic severe spinal stenosis. 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 She is clinically stable.     SUBJECTIVE INFORMATION  Assessed patient in room.    NUTRITION HISTORY  Pt reports decreased appetite and decreased po intakes since for x1 month, since previous admit. Pt reports unintentional weight loss, states she has observed visible muscle loss in arms. Pt denies any nausea vomiting or abd pain. Pt declines nutrition supplements, dislikes sweet foods/beverages. Pt declined any scheduled snacks.   Pt with ongoing constipation, last BM PTA- has scheduled bowel meds. Pt with iron deficiency anemia, on ferrous sulfate- discussed consuming Vitamin C to aid absorption.   NKFA     CURRENT NUTRITION ORDERS  Diet: Regular    CURRENT INTAKE/TOLERANCE  NPO/CL/FL 12/30-31 12/28-1/2 Pt consuming 25% of meals documented  Pt consuming 100% of breakfast this AM - eggs, toast, coffee  Pt meeting <75% nutrition needs >7 days     LABS  Nutrition-relevant labs: Reviewed      MEDICATIONS  Nutrition-relevant medications: Scheduled ferrous sulfate, MVI, protonix, miralax, senna     ANTHROPOMETRICS  Height: 165.1 cm (5' 5\")  Most Recent Weight: 60.8 kg (134 lb 0.6 oz)  IBW: 56.8 kg  % IBW: 107%  BMI (kg/m ): Normal BMI  Weight History: Predict admit wt stated   Date/Time Weight Weight Method "   01/01/25 0632 60.8 kg (134 lb 0.6 oz) Bed scale   12/30/24 0735 61.2 kg (134 lb 14.7 oz) Bed scale   12/27/24 1257 62.6 kg (138 lb) --     Wt Readings from Last 10 Encounters:   01/01/25 60.8 kg (134 lb 0.6 oz)   12/02/24 62.6 kg (138 lb)   12/05/23 63.5 kg (140 lb)   11/11/23 60.8 kg (134 lb)   11/22/22 63.8 kg (140 lb 11.2 oz)   03/07/22 62.6 kg (138 lb)   11/18/21 63.1 kg (139 lb 3.2 oz)   11/17/20 63.8 kg (140 lb 11.2 oz)   05/14/19 62.8 kg (138 lb 8 oz)   05/11/18 62.9 kg (138 lb 9.6 oz)      Dosing Weight: 60.8 kg, based on actual wt    ASSESSED NUTRITION NEEDS  Estimated Energy Needs: 7688-0392+ kcals/day (25 - 30+ kcals/kg)  Justification: Increased needs and Post-op  Estimated Protein Needs: 60-73+ grams protein/day (1 - 1.2+ grams of pro/kg)  Justification: Increased needs and Post-op  Estimated Fluid Needs: 1520 mL/day (25 mL/kg)  Justification: Maintenance    SYSTEM FINDINGS    Constipation   BM: last noted 12/27  Edema: +1 legs, ankles   Incision/surgical site     MALNUTRITION  % Intake: < 75% for > 7 days (moderate)  % Weight Loss: Weight loss does not meet criteria   Subcutaneous Fat Loss: None observed  Muscle Loss: Clavicles (pectoralis and deltoids): Moderate and Shoulders (deltoids): Mild  Fluid Accumulation/Edema: Mild, 1+  Malnutrition Diagnosis: Moderate malnutrition in the context of acute illness or injury  Malnutrition Present on Admission: Yes    NUTRITION DIAGNOSIS  Malnutrition (undernutrition) related to decreased appetite as evidenced by po <75% >7 days, mild/moderate muscle loss.     INTERVENTIONS  Pt declined nutrition supplements/snacks at this time   Encouraged increase oral sources of Vitamin C to aid iron absorption     Consider supplemental Vitamin C to aid iron absorption  Recommend adjust MVI to MVI/M   Recommend 100 mg thiamin x10 days with malnutrition     Goals  Meet >75% nutrition needs  Maintain weight   BM Normalize      Monitoring/Evaluation  Progress toward goals will  be monitored and evaluated per policy.

## 2025-01-03 NOTE — PROGRESS NOTES
"Orthopedic Progress Note      Assessment: 4 Day Post-Op  S/P Procedure(s):  INCISION AND DRAINAGE, LUMBAR SPINE  Lumbar 3-4 laminotomy, Lumbar 4-5 revision laminotomy @    Plan:   - Continue PT/OT  - Weightbearing status: WBAT can use brace for comfort  - No bending, twisting or lifting more than 10 pounds for 6 weeks.  - Anticoagulation: no chemical prophylaxis in addition to SCDs, madhu stockings and early ambulation.  - Orthosis consult placed for AFO brace. Foot drop noted after surgery. Spoke with the surgeon today and noted that there was a ton of work done around the nerve to repair the dural tear. There is a high likelihood she may have a foot drop for months or indefinitely   - Off bed rest now, with no headaches   - She needs to move VERY VERY SLOWLY LIKE A SLOTH.  - Discharge planning:  likely TCU pending bed availability today. Okay to discharge from ortho standpoint    Subjective:  Pain: minimal  Chest pain, SOB: no  Nausea, Vomiting:  no  Lightheadedness, Dizziness:  no  Neuro:  Patient denies new onset numbness or paresthesias    Patient is doing well this morning notes that her foot drop is still present. I educated her that she will likely have one for awhile just due to the amount of work done around the nerve. Currently on bedrest. Doing well with no new nerve pain down the leg or numbness or tingling.    Objective:  /60 (BP Location: Right arm)   Pulse 89   Temp 98  F (36.7  C) (Oral)   Resp 16   Ht 1.651 m (5' 5\")   Wt 60.8 kg (134 lb 0.6 oz)   LMP  (LMP Unknown)   SpO2 94%   BMI 22.31 kg/m    The patient is A&Ox3. Appears comfortable.   Sensation is intact.   plantar flexion is intact.  Dorsalis pedis pulse intact.  Calves are soft and non-tender. Negative Josefa's.  The incision is covered. Dressing C/D/I.    Pertinent Labs   Lab Results: personally reviewed.   Lab Results   Component Value Date    INR 1.04 12/28/2024     Lab Results   Component Value Date    WBC 5.1 01/02/2025    " HGB 9.5 (L) 01/02/2025    HCT 29.5 (L) 01/02/2025    MCV 89 01/02/2025     01/02/2025     Lab Results   Component Value Date     01/02/2025    CO2 26 01/02/2025         Report completed by: Zayra Caballero PA-C   [facility]  01/03/25

## 2025-01-03 NOTE — PLAN OF CARE
Problem: Adult Inpatient Plan of Care  Goal: Absence of Hospital-Acquired Illness or Injury  Intervention: Prevent Skin Injury  Recent Flowsheet Documentation  Taken 1/2/2025 2340 by Yoly Barahona RN  Body Position:   position changed independently   turned   right  Taken 1/2/2025 2237 by Yoly Barahona RN  Body Position:   turned   right  Taken 1/2/2025 2025 by Yoly Barahona RN  Body Position:   position changed independently   turned   right   Goal Outcome Evaluation:    A&O. VSS. Some numbness and tingling in R foot, otherwise CMS intact. Ambulating assist of 1 with walker and gait belt very slowly per orders. Using bedside commode, PVRs 2/3 passed. Dsg c/d/i. Mild pain managed with scheduled pain meds and rest. Piv BE.

## 2025-01-03 NOTE — PROGRESS NOTES
[facility]    Medicine Progress Note - Hospitalist Service    Date of Admission:  12/27/2024    Assessment & Plan   Pam Chino is a 79 year old female admitted for management of a dural tear following t L4-5 decompression surgery for chronic severe spinal stenosis.  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  She is clinically stable.    She has not had a bowel movement for a week.  This is a barrier to discharge.  Bowel regimen escalated.  She had a an BINDU noted yesterday.  Furosemide has been held.  Repeat BMP today.    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  - per orthopaedics    # Constipation  - bid senna/docusate, miralax  - suppository    # BINDU  - repeat BMP  - furosemide on hold    # Depression  # Anxiety    # Iron deficiency anemia  - iron supplementation    # GERD  -PPI          Diet: Discharge Instruction - Regular Diet Adult  Advance Diet as Tolerated: Regular Diet Adult  Diet      Mejia Catheter: Not present  Lines: None     Cardiac Monitoring: None  Code Status: Full Code      Clinically Significant Risk Factors          # Hypochloremia: Lowest Cl = 95 mmol/L in last 2 days, will monitor as appropriate     # Anion Gap Metabolic Acidosis: Highest Anion Gap = 19 mmol/L in last 2 days, will monitor and treat as appropriate  # Hypoalbuminemia: Lowest albumin = 3.2 g/dL at 12/28/2024  6:56 AM, will monitor as appropriate    # Acute Kidney Injury, unspecified: based on a >150% or 0.3 mg/dL increase in last creatinine compared to past 90 day average, will monitor renal function  # Hypertension: Noted on problem list                # Financial/Environmental Concerns: none         Social Drivers of Health    Housing Stability: Low Risk  (12/30/2024)    Housing Stability     Do you have housing? : Yes     Are you worried about losing your housing?: No   Recent Concern: Housing Stability - High Risk (12/2/2024)    Housing Stability     Do  you have housing? : No     Are you worried about losing your housing?: No   Tobacco Use: Medium Risk (12/2/2024)    Patient History     Smoking Tobacco Use: Former     Smokeless Tobacco Use: Never     Passive Exposure: Past          Disposition Plan     Medically Ready for Discharge: Anticipated Tomorrow             Mykel Green MD  Hospitalist Service  [facility]  Securely message with [employer] (more info)  Text page via [place] Paging/Directory   ______________________________________________________________________    Interval History   She has been up to the commode.  Endorses flatus.  Last bowel movement was a week ago.  Reports having a right foot drop.  Denies chest pain, chest pressure, dyspnea, or headache.    Physical Exam   Vital Signs: Temp: 98  F (36.7  C) Temp src: Oral BP: 129/60 Pulse: 89   Resp: 16 SpO2: 94 % O2 Device: None (Room air)    Weight: 134 lbs .63 oz    Gen:  lying in bed in no extremis  Neuro:  alert, conversant; unable to dorsiflex at the right ankle  CV:  nl rate, regular rhythm  Pulm:  no acute resp distress, ctab anteriorly  GI:  abdomen soft, non distended, bowel sounds present, NTTP      Medical Decision Making             Data

## 2025-01-03 NOTE — PROGRESS NOTES
"SPIRITUAL HEALTH SERVICES ([facility])  SPIRITUAL ASSESSMENT Progress Note  [facility]. [address]     REFERRAL SOURCE: MARY Orozco is open to [facility] visits.  Because her Spiritism was not recorded as Religion neither the [facility] [facility] ministers nor staff priests have been in to see her.  Pam belongs to [facility] [employer] but does not wish Harpal Jones to be contacted.     She is \"overwhelmed\" at her situation. She has had two back surgeries in less than a month and is facing a second TCU placement. However her Religion sue in [employer] is sustaining her through this.  She will be discharged to [facility] where her daughter is a patient advocate and that gives her comfort. .      PLAN: [facility] will follow during this hospitalization.      Caroline Henley, Ph.D., [facility]      [facility] available 24/7 for emergency requests/referrals, either by having the on-call  paged or by entering an ASAP/STAT consult in Epic (this will also page the on-call ).  "

## 2025-01-04 ENCOUNTER — APPOINTMENT (OUTPATIENT)
Dept: OCCUPATIONAL THERAPY | Facility: CLINIC | Age: 80
End: 2025-01-04
Payer: COMMERCIAL

## 2025-01-04 LAB — BACTERIA TISS BX CULT: NO GROWTH

## 2025-01-04 PROCEDURE — 250N000013 HC RX MED GY IP 250 OP 250 PS 637

## 2025-01-04 PROCEDURE — 99232 SBSQ HOSP IP/OBS MODERATE 35: CPT | Performed by: HOSPITALIST

## 2025-01-04 PROCEDURE — 250N000013 HC RX MED GY IP 250 OP 250 PS 637: Performed by: HOSPITALIST

## 2025-01-04 PROCEDURE — 120N000001 HC R&B MED SURG/OB

## 2025-01-04 PROCEDURE — 250N000013 HC RX MED GY IP 250 OP 250 PS 637: Performed by: INTERNAL MEDICINE

## 2025-01-04 PROCEDURE — 250N000013 HC RX MED GY IP 250 OP 250 PS 637: Performed by: PHYSICIAN ASSISTANT

## 2025-01-04 PROCEDURE — 97535 SELF CARE MNGMENT TRAINING: CPT | Mod: GO

## 2025-01-04 RX ORDER — SENNOSIDES 8.6 MG
8.6 TABLET ORAL 2 TIMES DAILY
Status: DISCONTINUED | OUTPATIENT
Start: 2025-01-04 | End: 2025-01-06 | Stop reason: HOSPADM

## 2025-01-04 RX ADMIN — CITALOPRAM HYDROBROMIDE 20 MG: 20 TABLET ORAL at 08:57

## 2025-01-04 RX ADMIN — PANTOPRAZOLE SODIUM 40 MG: 40 TABLET, DELAYED RELEASE ORAL at 08:57

## 2025-01-04 RX ADMIN — GABAPENTIN 100 MG: 100 CAPSULE ORAL at 08:58

## 2025-01-04 RX ADMIN — METHOCARBAMOL 500 MG: 500 TABLET ORAL at 14:04

## 2025-01-04 RX ADMIN — THERA TABS 1 TABLET: TAB at 08:58

## 2025-01-04 RX ADMIN — GABAPENTIN 100 MG: 100 CAPSULE ORAL at 20:47

## 2025-01-04 RX ADMIN — METOPROLOL SUCCINATE ER TABLETS 12.5 MG: 25 TABLET, FILM COATED, EXTENDED RELEASE ORAL at 20:47

## 2025-01-04 RX ADMIN — METHOCARBAMOL 500 MG: 500 TABLET ORAL at 08:57

## 2025-01-04 RX ADMIN — ACETAMINOPHEN 650 MG: 325 TABLET ORAL at 20:47

## 2025-01-04 RX ADMIN — METHOCARBAMOL 500 MG: 500 TABLET ORAL at 20:47

## 2025-01-04 RX ADMIN — GABAPENTIN 100 MG: 100 CAPSULE ORAL at 14:03

## 2025-01-04 ASSESSMENT — ACTIVITIES OF DAILY LIVING (ADL)
ADLS_ACUITY_SCORE: 38
ADLS_ACUITY_SCORE: 34
ADLS_ACUITY_SCORE: 36
ADLS_ACUITY_SCORE: 36
ADLS_ACUITY_SCORE: 38
ADLS_ACUITY_SCORE: 36
ADLS_ACUITY_SCORE: 34
ADLS_ACUITY_SCORE: 37
ADLS_ACUITY_SCORE: 36
ADLS_ACUITY_SCORE: 36
ADLS_ACUITY_SCORE: 34
ADLS_ACUITY_SCORE: 37
ADLS_ACUITY_SCORE: 36
ADLS_ACUITY_SCORE: 38
ADLS_ACUITY_SCORE: 37
ADLS_ACUITY_SCORE: 37
ADLS_ACUITY_SCORE: 38
ADLS_ACUITY_SCORE: 37
ADLS_ACUITY_SCORE: 36
ADLS_ACUITY_SCORE: 38
ADLS_ACUITY_SCORE: 38
ADLS_ACUITY_SCORE: 37
ADLS_ACUITY_SCORE: 36

## 2025-01-04 NOTE — PROGRESS NOTES
[facility]    Medicine Progress Note - Hospitalist Service    Date of Admission:  12/27/2024    Assessment & Plan   Pam Chnio is a 79 year old female admitted for management of a dural tear following L4-5 decompression surgery for chronic severe spinal stenosis.  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  Course further complicated by right foot drop.  She is clinically stable.  Constipation now resolved.  BINDU has also improved.  She is medically ready for discharge when appropriate disposition is arranged.    Suspect productive cough may be related to atelectasis.  Benign pulmonary exam and lack of hypoxia are reassuring.  Provided IS device to patient.    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     # Constipation, resolved  - bid senna    # BINDU, resolved  - resume furosemide    # Depression  # Anxiety    # Iron deficiency anemia  - iron supplementation    # GERD  -PPI          Diet: Discharge Instruction - Regular Diet Adult  Advance Diet as Tolerated: Regular Diet Adult  Diet      Mejia Catheter: Not present  Lines: None     Cardiac Monitoring: None  Code Status: Full Code      Clinically Significant Risk Factors          # Hypochloremia: Lowest Cl = 97 mmol/L in last 2 days, will monitor as appropriate      # Hypoalbuminemia: Lowest albumin = 3.2 g/dL at 12/28/2024  6:56 AM, will monitor as appropriate     # Hypertension: Noted on problem list             # Moderate Malnutrition: based on nutrition assessment    # Financial/Environmental Concerns: none         Social Drivers of Health    Housing Stability: Low Risk  (12/30/2024)    Housing Stability     Do you have housing? : Yes     Are you worried about losing your housing?: No   Recent Concern: Housing Stability - High Risk (12/2/2024)    Housing Stability     Do you have  housing? : No     Are you worried about losing your housing?: No   Tobacco Use: Medium Risk (12/2/2024)    Patient History     Smoking Tobacco Use: Former     Smokeless Tobacco Use: Never     Passive Exposure: Past          Disposition Plan     Medically Ready for Discharge: Ready Now             Mykel Green MD  Hospitalist Service  [facility]  Securely message with [employer] (more info)  Text page via [place] Paging/Directory   ______________________________________________________________________    Interval History   Reports her back is sore.  Denies chest pain, chest pressure, or dyspnea.  Had a bowel movement.  Has had a productive cough since surgery.  Has not been using incentive spirometer.    Physical Exam   Vital Signs: Temp: 97.4  F (36.3  C) Temp src: Oral BP: (!) 141/73 Pulse: 79   Resp: 18 SpO2: 94 % O2 Device: None (Room air)    Weight: 134 lbs .63 oz    Gen:  sitting in chair in no extremis  Neuro:  alert, conversant  CV: borderline tachycardia, regular rhythm  Pulm:  no acute resp distress, ctab  GI:  abdomen NTTP  MSK:  brace on right foot    Medical Decision Making             Data

## 2025-01-04 NOTE — PLAN OF CARE
Problem: Adult Inpatient Plan of Care  Goal: Absence of Hospital-Acquired Illness or Injury  Intervention: Identify and Manage Fall Risk  Recent Flowsheet Documentation  Taken 1/3/2025 1630 by Star Hernández RN  Safety Promotion/Fall Prevention:   activity supervised   clutter free environment maintained   increased rounding and observation   increase visualization of patient   mobility aid in reach   lighting adjusted   nonskid shoes/slippers when out of bed   safety round/check completed     Problem: Adult Inpatient Plan of Care  Goal: Absence of Hospital-Acquired Illness or Injury  Intervention: Prevent Skin Injury  Recent Flowsheet Documentation  Taken 1/3/2025 1630 by Star Hernández RN  Body Position: (Dangling at bedside)   weight shifting   other (see comments)   Goal Outcome Evaluation:      Plan of Care Reviewed With: patient    Overall Patient Progress: improvingOverall Patient Progress: improving  Patient vital signs are at baseline: Yes  Patient able to ambulate as they were prior to admission or with assist devices provided by therapies during their stay:  due to ambulate  Patient MUST void prior to discharge:  Yes  Patient able to tolerate oral intake:  Yes  Pain has adequate pain control using Oral analgesics:  Yes  Does patient have an identified :  Yes  Has goal D/C date and time been discussed with patient:  Yes   Pt alert and oriented, vss on room air, can voice needs, she is assist of one,  she MUST be slow while moving, voiding ok, has not yet had a bowel movement, has R foot drop, wears a boot.

## 2025-01-04 NOTE — PLAN OF CARE
Problem: Adult Inpatient Plan of Care  Goal: Optimal Comfort and Wellbeing  Outcome: Progressing     Problem: Nausea and Vomiting  Goal: Nausea and Vomiting Relief  Outcome: Progressing     Problem: Pain Acute  Goal: Optimal Pain Control and Function  Outcome: Progressing  Intervention: Prevent or Manage Pain  Recent Flowsheet Documentation  Taken 1/4/2025 0900 by Shayna Munoz RN  Medication Review/Management: medications reviewed     Problem: Spinal Surgery  Goal: Optimal Coping with Surgery  Outcome: Progressing     Goal Outcome Evaluation:    Pt is A&O, calls appropriately for needs and is an assist 1 with GB and walker for ambulation. Vitals signs are stable, afebrile, oxygen is on room air. Pt complains of pain 3/10, to lower back, scheduled medication given for this, with relief. Pt is tolerating regular diet. Pt is voiding spontaneously, last bowel movement 1/4/2025.  Alarms in place.     Plan:  Pending TCU placement to [facility] on Monday.    Shayna Munoz RN  January 4, 2025, 3:30 PM

## 2025-01-04 NOTE — PROGRESS NOTES
[facility]    Orthopedics  Daily Post-Op Note    Assessment & Plan   Procedure(s):  INCISION AND DRAINAGE, LUMBAR SPINE  Lumbar 3-4 laminotomy, Lumbar 4-5 revision laminotomy   -5 Days Post-Op  Doing well.  Clean wound without signs of infection.  Normal healing wound.  No immediate surgical complications identified.  No excessive bleeding  Plan:  -Continue supportive and symptomatic treatment  -Pain control measures  -Advance diet as tolerated  -Routine wound care  -TCU when available, likely Monday     Addy Hunter PA-C  Clinically Significant Risk Factors          # Hypochloremia: Lowest Cl = 97 mmol/L in last 2 days, will monitor as appropriate      # Hypoalbuminemia: Lowest albumin = 3.2 g/dL at 12/28/2024  6:56 AM, will monitor as appropriate     # Hypertension: Noted on problem list             # Moderate Malnutrition: based on nutrition assessment    # Financial/Environmental Concerns: none           Interval History   Stable.  Doing well.  Improving slowly.  Pain is reasonably controlled.  No fevers.     Physical Exam   Temp: 97.4  F (36.3  C) Temp src: Oral BP: (!) 141/73 Pulse: 79   Resp: 18 SpO2: 94 % O2 Device: None (Room air)    Vitals:    12/27/24 1257 12/30/24 0735 01/01/25 0632   Weight: 62.6 kg (138 lb) 61.2 kg (134 lb 14.7 oz) 60.8 kg (134 lb 0.6 oz)     Vital Signs with Ranges  Temp:  [97.4  F (36.3  C)-98.6  F (37  C)] 97.4  F (36.3  C)  Pulse:  [72-79] 79  Resp:  [16-18] 18  BP: (101-141)/(49-73) 141/73  SpO2:  [94 %] 94 %  I/O last 3 completed shifts:  In: 840 [P.O.:840]  Out: 150 [Urine:150]    Wound clean and dry with minimal or no drainage.  Surrounding skin with minimal erythema.  Dressing dry and intact.      Medications   Current Facility-Administered Medications   Medication Dose Route Frequency Provider Last Rate Last Admin      Current Facility-Administered Medications   Medication Dose Route Frequency Provider Last Rate Last Admin    citalopram  (celeXA) tablet 20 mg  20 mg Oral QAM ObBernard wright, DO   20 mg at 01/04/25 0857    ferrous sulfate (FEROSUL) tablet 325 mg  325 mg Oral Every Other Day ObBernard wright, DO   325 mg at 01/03/25 0812    [Held by provider] furosemide (LASIX) tablet 20 mg  20 mg Oral Daily OberBernard barrientos, DO   20 mg at 01/01/25 0914    gabapentin (NEURONTIN) capsule 100 mg  100 mg Oral TID ObBernard wright, DO   100 mg at 01/04/25 0858    methocarbamol (ROBAXIN) tablet 500 mg  500 mg Oral TID OberBernard barrientos, DO   500 mg at 01/04/25 0857    metoprolol succinate ER (TOPROL-XL) 24 hr half-tab 12.5 mg  12.5 mg Oral At Bedtime Mariaa Carlin MD   12.5 mg at 01/03/25 2100    multivitamin, therapeutic (THERA-VIT) tablet 1 tablet  1 tablet Oral Daily Mykel Holder PA-C   1 tablet at 01/04/25 0858    pantoprazole (PROTONIX) EC tablet 40 mg  40 mg Oral QAM ObBernard wright, DO   40 mg at 01/04/25 0857    polyethylene glycol (MIRALAX) Packet 17 g  17 g Oral BID Mykel Green MD        senna-docusate (SENOKOT-S/PERICOLACE) 8.6-50 MG per tablet 1 tablet  1 tablet Oral BID Mykel Holder PA-C   1 tablet at 01/03/25 2056    sodium chloride (PF) 0.9% PF flush 3 mL  3 mL Intracatheter Q8H Bernard Ortiz, DO   3 mL at 01/04/25 0009       Data   Results for orders placed or performed during the hospital encounter of 12/27/24 (from the past 24 hours)   Basic metabolic panel   Result Value Ref Range    Sodium 139 135 - 145 mmol/L    Potassium 3.5 3.4 - 5.3 mmol/L    Chloride 97 (L) 98 - 107 mmol/L    Carbon Dioxide (CO2) 31 (H) 22 - 29 mmol/L    Anion Gap 11 7 - 15 mmol/L    Urea Nitrogen 11.0 8.0 - 23.0 mg/dL    Creatinine 0.70 0.51 - 0.95 mg/dL    GFR Estimate 87 >60 mL/min/1.73m2    Calcium 8.7 (L) 8.8 - 10.4 mg/dL    Glucose 99 70 - 99 mg/dL   Extra Tube    Narrative    The following orders were created for panel order Extra Tube.  Procedure                               Abnormality         Status                      ---------                               -----------         ------                     Extra Purple Top Tube[[id number]]                            Final result                 Please view results for these tests on the individual orders.   Extra Purple Top Tube   Result Value Ref Range    Hold Specimen JI

## 2025-01-04 NOTE — PLAN OF CARE
Patient vital signs are at baseline: Yes  Patient able to ambulate as they were prior to admission or with assist devices provided by therapies during their stay:  No,  Reason:  pt did not ambulate overnight  Patient MUST void prior to discharge:  Yes  Patient able to tolerate oral intake:  Yes  Pain has adequate pain control using Oral analgesics:  Yes  Does patient have an identified :  Yes  Has goal D/C date and time been discussed with patient:  Yes    A/O. Denies SOB/N/V, headache. Able to make needs known.      Goal Outcome Evaluation:    Problem: Adult Inpatient Plan of Care  Goal: Absence of Hospital-Acquired Illness or Injury  Intervention: Prevent Skin Injury  Recent Flowsheet Documentation  Taken 1/4/2025 0005 by Loni Finley RN  Body Position:   position changed independently   turned   left     Problem: Adult Inpatient Plan of Care  Goal: Optimal Comfort and Wellbeing  Outcome: Progressing     Problem: Pain Acute  Goal: Optimal Pain Control and Function  Outcome: Progressing  Intervention: Prevent or Manage Pain  Recent Flowsheet Documentation  Taken 1/4/2025 0005 by Loni Finley RN  Medication Review/Management: medications reviewed

## 2025-01-05 ENCOUNTER — APPOINTMENT (OUTPATIENT)
Dept: OCCUPATIONAL THERAPY | Facility: CLINIC | Age: 80
End: 2025-01-05
Payer: COMMERCIAL

## 2025-01-05 PROCEDURE — 120N000001 HC R&B MED SURG/OB

## 2025-01-05 PROCEDURE — 250N000013 HC RX MED GY IP 250 OP 250 PS 637: Performed by: PHYSICIAN ASSISTANT

## 2025-01-05 PROCEDURE — 250N000013 HC RX MED GY IP 250 OP 250 PS 637: Performed by: HOSPITALIST

## 2025-01-05 PROCEDURE — 250N000013 HC RX MED GY IP 250 OP 250 PS 637: Performed by: INTERNAL MEDICINE

## 2025-01-05 PROCEDURE — 97535 SELF CARE MNGMENT TRAINING: CPT | Mod: GO

## 2025-01-05 PROCEDURE — 250N000013 HC RX MED GY IP 250 OP 250 PS 637

## 2025-01-05 PROCEDURE — 99232 SBSQ HOSP IP/OBS MODERATE 35: CPT

## 2025-01-05 RX ADMIN — FERROUS SULFATE TAB 325 MG (65 MG ELEMENTAL FE) 325 MG: 325 (65 FE) TAB at 09:01

## 2025-01-05 RX ADMIN — ACETAMINOPHEN 650 MG: 325 TABLET ORAL at 21:19

## 2025-01-05 RX ADMIN — METHOCARBAMOL 500 MG: 500 TABLET ORAL at 21:19

## 2025-01-05 RX ADMIN — METOPROLOL SUCCINATE ER TABLETS 12.5 MG: 25 TABLET, FILM COATED, EXTENDED RELEASE ORAL at 21:20

## 2025-01-05 RX ADMIN — GABAPENTIN 100 MG: 100 CAPSULE ORAL at 21:19

## 2025-01-05 RX ADMIN — GABAPENTIN 100 MG: 100 CAPSULE ORAL at 09:02

## 2025-01-05 RX ADMIN — FUROSEMIDE 20 MG: 20 TABLET ORAL at 09:02

## 2025-01-05 RX ADMIN — THERA TABS 1 TABLET: TAB at 09:01

## 2025-01-05 RX ADMIN — GABAPENTIN 100 MG: 100 CAPSULE ORAL at 14:02

## 2025-01-05 RX ADMIN — METHOCARBAMOL 500 MG: 500 TABLET ORAL at 09:02

## 2025-01-05 RX ADMIN — METHOCARBAMOL 500 MG: 500 TABLET ORAL at 14:02

## 2025-01-05 RX ADMIN — CITALOPRAM HYDROBROMIDE 20 MG: 20 TABLET ORAL at 09:02

## 2025-01-05 RX ADMIN — PANTOPRAZOLE SODIUM 40 MG: 40 TABLET, DELAYED RELEASE ORAL at 09:02

## 2025-01-05 ASSESSMENT — ACTIVITIES OF DAILY LIVING (ADL)
ADLS_ACUITY_SCORE: 37
ADLS_ACUITY_SCORE: 35
ADLS_ACUITY_SCORE: 37
ADLS_ACUITY_SCORE: 37
ADLS_ACUITY_SCORE: 35
ADLS_ACUITY_SCORE: 37
ADLS_ACUITY_SCORE: 35
ADLS_ACUITY_SCORE: 35
ADLS_ACUITY_SCORE: 37
ADLS_ACUITY_SCORE: 35
ADLS_ACUITY_SCORE: 37
ADLS_ACUITY_SCORE: 37

## 2025-01-05 NOTE — PLAN OF CARE
Note from [id number]. Pt denied pain during shift thus far. No new skin issues noted. Dressing changed to back, CDI. Numbness and tingling to RLE per pt. Pt now has weak dorsi/plantar movement in her RLE. SBA with walker for transferring. Saline locked. Voiding adequately. BM today. Education on medication administration and use of call-light to reduce risk for falls and injury. Alarms in place. No further issues noted. VSS, denies shortness of breath.     Kaylin Adame RN

## 2025-01-05 NOTE — PLAN OF CARE
Problem: Adult Inpatient Plan of Care  Goal: Absence of Hospital-Acquired Illness or Injury  Intervention: Identify and Manage Fall Risk  Problem: Adult Inpatient Plan of Care  Goal: Optimal Comfort and Wellbeing  Intervention: Monitor Pain and Promote Comfort   Problem: Spinal Surgery  Goal: Absence of Bleeding  Intervention: Monitor and Manage Bleeding    Goal Outcome Evaluation:  Patient vital signs are at baseline: Yes  Patient able to ambulate as they were prior to admission or with assist devices provided by therapies during their stay:  Yes  Patient MUST void prior to discharge:  Yes  Patient able to tolerate oral intake:  Yes  Pain has adequate pain control using Oral analgesics:  Yes  Does patient have an identified :  Yes  Has goal D/C date and time been discussed with patient:  Yes. Plan is to discharge to TCU.

## 2025-01-05 NOTE — PROGRESS NOTES
"Orthopedic Progress Note      Assessment: 6 Days Post-Op  S/P Procedure(s):  INCISION AND DRAINAGE, LUMBAR SPINE  Lumbar 3-4 laminotomy, Lumbar 4-5 revision laminotomy @    Plan:   - Continue PT/OT  - Weightbearing status: WBAT can use brace for comfort  - No bending, twisting or lifting more than 10 pounds for 6 weeks.  - Anticoagulation: no chemical prophylaxis in addition to SCDs, madhu stockings and early ambulation.  - Orthosis consult placed for AFO brace. Foot drop noted after surgery. Spoke with the surgeon today and noted that there was a ton of work done around the nerve to repair the dural tear. There is a high likelihood she may have a foot drop for months or indefinitely   - Off bed rest now, with no headaches   - Discharge planning: pending progression with therapies and pain control, planning for TCU on Monday      Subjective:  Pain: minimal  Chest pain, SOB: no  Nausea, Vomiting:  no  Lightheadedness, Dizziness:  no  Neuro:  Patient denies new onset numbness or paresthesias    Patient is doing well this morning. No acute events overnight. Foot drop still present and was able to get AFO brace. No new numbness or tingling. Tolerating oral diet. Passing gas. Voiding without difficulty. No other concerns.     Objective:  BP 97/52 (BP Location: Right arm)   Pulse 77   Temp 98.4  F (36.9  C) (Oral)   Resp 16   Ht 1.651 m (5' 5\")   Wt 60.8 kg (134 lb 0.6 oz)   LMP  (LMP Unknown)   SpO2 93%   BMI 22.31 kg/m    The patient is A&Ox3. Appears comfortable.   Sensation is intact.  Dorsiflexion and plantar flexion is intact on left side. Unable to perform dorsiflexion on right side.  Dorsalis pedis pulse intact.  Calves are soft and non-tender. Negative Josefa's.  The incision is covered. Dressing C/D/I.      Pertinent Labs   Lab Results: personally reviewed.   Lab Results   Component Value Date    INR 1.04 12/28/2024     Lab Results   Component Value Date    WBC 5.1 01/02/2025    HGB 9.5 (L) 01/02/2025    " HCT 29.5 (L) 01/02/2025    MCV 89 01/02/2025     01/02/2025     Lab Results   Component Value Date     01/03/2025    CO2 31 (H) 01/03/2025         Report completed by:  CUBA BURGESS PA-C  [facility]    Date: 1/5/2025  Time: 7:15 AM

## 2025-01-06 ENCOUNTER — APPOINTMENT (OUTPATIENT)
Dept: OCCUPATIONAL THERAPY | Facility: CLINIC | Age: 80
End: 2025-01-06
Payer: COMMERCIAL

## 2025-01-06 VITALS
HEART RATE: 86 BPM | OXYGEN SATURATION: 96 % | DIASTOLIC BLOOD PRESSURE: 67 MMHG | TEMPERATURE: 98.4 F | RESPIRATION RATE: 16 BRPM | SYSTOLIC BLOOD PRESSURE: 135 MMHG | WEIGHT: 121.4 LBS | HEIGHT: 65 IN | BODY MASS INDEX: 20.23 KG/M2

## 2025-01-06 LAB — BACTERIA TISS BX CULT: NORMAL

## 2025-01-06 PROCEDURE — 97535 SELF CARE MNGMENT TRAINING: CPT | Mod: GO

## 2025-01-06 PROCEDURE — 250N000013 HC RX MED GY IP 250 OP 250 PS 637: Performed by: INTERNAL MEDICINE

## 2025-01-06 PROCEDURE — 250N000013 HC RX MED GY IP 250 OP 250 PS 637

## 2025-01-06 PROCEDURE — 99239 HOSP IP/OBS DSCHRG MGMT >30: CPT | Performed by: INTERNAL MEDICINE

## 2025-01-06 PROCEDURE — 250N000013 HC RX MED GY IP 250 OP 250 PS 637: Performed by: HOSPITALIST

## 2025-01-06 PROCEDURE — 250N000013 HC RX MED GY IP 250 OP 250 PS 637: Performed by: PHYSICIAN ASSISTANT

## 2025-01-06 RX ADMIN — METHOCARBAMOL 500 MG: 500 TABLET ORAL at 09:20

## 2025-01-06 RX ADMIN — SENNOSIDES 8.6 MG: 8.6 TABLET, FILM COATED ORAL at 09:20

## 2025-01-06 RX ADMIN — CITALOPRAM HYDROBROMIDE 20 MG: 20 TABLET ORAL at 09:20

## 2025-01-06 RX ADMIN — GABAPENTIN 100 MG: 100 CAPSULE ORAL at 09:20

## 2025-01-06 RX ADMIN — PANTOPRAZOLE SODIUM 40 MG: 40 TABLET, DELAYED RELEASE ORAL at 09:20

## 2025-01-06 RX ADMIN — METHOCARBAMOL 500 MG: 500 TABLET ORAL at 13:36

## 2025-01-06 RX ADMIN — THERA TABS 1 TABLET: TAB at 09:20

## 2025-01-06 RX ADMIN — ACETAMINOPHEN 650 MG: 325 TABLET ORAL at 12:48

## 2025-01-06 RX ADMIN — GABAPENTIN 100 MG: 100 CAPSULE ORAL at 13:36

## 2025-01-06 RX ADMIN — FUROSEMIDE 20 MG: 20 TABLET ORAL at 09:20

## 2025-01-06 ASSESSMENT — ACTIVITIES OF DAILY LIVING (ADL)
ADLS_ACUITY_SCORE: 38
ADLS_ACUITY_SCORE: 35
ADLS_ACUITY_SCORE: 38
ADLS_ACUITY_SCORE: 38
ADLS_ACUITY_SCORE: 35
ADLS_ACUITY_SCORE: 38
ADLS_ACUITY_SCORE: 35
ADLS_ACUITY_SCORE: 38
ADLS_ACUITY_SCORE: 38
ADLS_ACUITY_SCORE: 35
ADLS_ACUITY_SCORE: 38

## 2025-01-06 NOTE — PLAN OF CARE
Problem: Adult Inpatient Plan of Care  Goal: Absence of Hospital-Acquired Illness or Injury  Intervention: Prevent Infection  Recent Flowsheet Documentation  Taken 1/5/2025 1640 by Helga Hightower RN  Infection Prevention: rest/sleep promoted     Pt A&Ox4. VSS on RA. CMS intact except right foot drop following surgery. Brace on when oob. Dressing c/d/i. Voiding adequately. Up with assist of 1 gb and walker. Pain managed with PRN tylenol and scheduled gabapentin and robaxin. IV SL. Discharge pending TCU placement.

## 2025-01-06 NOTE — PLAN OF CARE
Physical Therapy Discharge Summary    Reason for therapy discharge:    Discharged to transitional care facility.    Progress towards therapy goal(s). See goals on Care Plan in [facility] electronic health record for goal details.  Goals not met.  Barriers to achieving goals:   discharge from facility.    Therapy recommendation(s):    Continued therapy is recommended.  Rationale/Recommendations:  continued PT at TCU to improve functional mobility.  Continue home exercise program.

## 2025-01-06 NOTE — PROGRESS NOTES
Care Management Discharge Note    Discharge Date: 01/06/2025       Discharge Disposition: Transitional Care    Discharge Services: Home Care    Discharge DME: None    Discharge Transportation: family or friend will provide    Private pay costs discussed: Not applicable    Does the patient's insurance plan have a 3 day qualifying hospital stay waiver?  No    PAS Confirmation Code: N/A, facility does not require  Patient/family educated on Medicare website which has current facility and service quality ratings: yes    Education Provided on the Discharge Plan: Yes  Persons Notified of Discharge Plans: Pt and TCU   Patient/Family in Agreement with the Plan: yes    Handoff Referral Completed: No, handoff not indicated or clinically appropriate    Additional Information:  Pt will discharge to [facility] TCU today at 2:00 PM.  Pt has a family member picking her up.  No PAS needed. Pt will call her daughter  and let her know. JAMES confirmed with Dariela from [facility] that Pt can leave at 2:00 PM as they have discharge at noon.     LUIS Syed

## 2025-01-06 NOTE — PROGRESS NOTES
"Orthopedic Progress Note      Assessment: 7 Days Post-Op  S/P Procedure(s):  INCISION AND DRAINAGE, LUMBAR SPINE  Lumbar 3-4 laminotomy, Lumbar 4-5 revision laminotomy @    Plan:   - Continue PT/OT  - Weightbearing status: WBAT can use brace for comfort  - No bending, twisting or lifting more than 10 pounds for 6 weeks.  - Anticoagulation: no chemical prophylaxis in addition to SCDs, madhu stockings and early ambulation.  - Orthosis consult placed for AFO brace. Foot drop noted after surgery. Spoke with the surgeon and noted that there was a ton of work done around the nerve to repair the dural tear. There is a high likelihood she may have a foot drop for months or indefinitely   - Off bed rest now, with no headaches   - Discharge planning: TCU today      Subjective:  Pain: minimal  Chest pain, SOB: no  Nausea, Vomiting:  no  Lightheadedness, Dizziness:  no  Neuro:  Patient denies new onset numbness or paresthesias    Patient is doing well this morning. No acute events overnight. Foot drop still present and was able to get AFO brace. No new numbness or tingling. Tolerating oral diet. Passing gas. Voiding without difficulty. No other concerns.     Objective:  /67 (BP Location: Right arm)   Pulse 86   Temp 98.4  F (36.9  C) (Oral)   Resp 16   Ht 1.651 m (5' 5\")   Wt 55.1 kg (121 lb 6.4 oz)   LMP  (LMP Unknown)   SpO2 96%   BMI 20.20 kg/m    The patient is A&Ox3. Appears comfortable.   Sensation is intact.  Dorsiflexion and plantar flexion is intact on left side. Unable to perform dorsiflexion on right side.  Dorsalis pedis pulse intact.  Calves are soft and non-tender. Negative Josefa's.  The incision is covered. Dressing removed      Pertinent Labs   Lab Results: personally reviewed.   Lab Results   Component Value Date    INR 1.04 12/28/2024     Lab Results   Component Value Date    WBC 5.1 01/02/2025    HGB 9.5 (L) 01/02/2025    HCT 29.5 (L) 01/02/2025    MCV 89 01/02/2025     01/02/2025     Lab " Results   Component Value Date     01/03/2025    CO2 31 (H) 01/03/2025         Report completed by:  Zayra Caballero PA-C   [facility]  01/06/25

## 2025-01-06 NOTE — PROGRESS NOTES
Occupational Therapy Discharge Summary    Reason for therapy discharge:    Discharged to transitional care facility.    Progress towards therapy goal(s). See goals on Care Plan in [facility] electronic health record for goal details.  Goals partially met.  Barriers to achieving goals:   discharge from facility.    Therapy recommendation(s):    Continued therapy is recommended.  Rationale/Recommendations:  To increase indep with ADLs and trsfs while following her spinal precautions.  Continue to monitor R foot drop.  Currently using AFO in the R shoe.

## 2025-01-06 NOTE — DISCHARGE SUMMARY
[facility]  Hospitalist Discharge Summary      Date of Admission:  12/27/2024  Date of Discharge:  1/6/2025  Discharging Provider: Bernard Ortiz DO  Discharge Service: Hospitalist Service    Discharge Diagnoses   Wound drainage  Status post L4-L5 decompression surgery  Status post repair of spinal fluid leak/pseudomeningocele.   Right foot drop  Postoperative headache  Status post incision and drainage L3-4 and L4-5 revision laminotomy.   Constipation  Acute kidney injury  Depression and anxiety  Iron deficiency anemia  Gastroesophageal reflux disease    Clinically Significant Risk Factors     # Moderate Malnutrition: based on nutrition assessment      Follow-ups Needed After Discharge   Follow-up Appointments       Follow Up Care      Follow-up with your Surgeon Team in 3 weeks for suture removal    Call [phone] for appt or call Dr Winchester team voicemail at [phone]        Follow Up and recommended labs and tests      Follow up with Nursing home physician.    Follow up with [facility] per their orders.              Discharge Disposition   Discharged to short-term care facility  Condition at discharge: Stable    Hospital Course   Pam Chino is a 79 year old female admitted for management of a dural tear following L4-5 decompression surgery for chronic severe spinal stenosis. 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 Course further complicated by right foot drop. She is clinically stable. Constipation now resolved. BINDU has also improved. Plan for TCU for ongoing PT and OT evaluation.  [facility] to see next week for wound check and stiches removal.     Consultations This Hospital Stay   CARE MANAGEMENT / SOCIAL WORK IP CONSULT  ORTHOPEDIC SURGERY IP CONSULT  HOSPITALIST IP CONSULT  PHYSICAL THERAPY ADULT IP CONSULT  OCCUPATIONAL THERAPY ADULT IP CONSULT  PHYSICAL THERAPY ADULT IP CONSULT  OCCUPATIONAL THERAPY ADULT IP  "CONSULT    Code Status   Full Code    Time Spent on this Encounter   I, Bernard Ortiz DO, personally saw the patient today and spent greater than 30 minutes discharging this patient.       Bernard Ortiz DO  [address] [address]  Phone: [phone]  Fax: [phone]  ______________________________________________________________________    Physical Exam   Vital Signs: Temp: 98.4  F (36.9  C) Temp src: Oral BP: 135/67 Pulse: 86   Resp: 16 SpO2: 96 % O2 Device: None (Room air)    Weight: 121 lbs 6.4 oz  Gen:  sitting in chair in no extremis  Neuro:  alert, conversant  CV: borderline tachycardia, regular rhythm  Pulm:  no acute resp distress, ctab  GI:  abdomen NTTP  MSK:  3/5 dorsiflexion on right.        Primary Care Physician   Emmy Ewing    Discharge Orders      Primary Care - Care Coordination Referral      Reason for your hospital stay    surgery     When to call - Contact Surgeon Team    You may experience symptoms that require follow-up before your scheduled appointment. Refer to the \"Stoplight Tool\" for instructions on when to contact your Surgeon Team if you are concerned about pain control, blood clots, constipation, or if you are unable to urinate.     When to call - Reach out to Urgent Care    If you are not able to reach your Surgeon Team and you need immediate care, go to the Orthopedic Walk-in Clinic or Urgent Care at your Surgeon's office.  Do NOT go to the Emergency Room unless you have shortness of breath, chest pain, or other signs of a medical emergency.     When to call - Reasons to Call 911    Call 911 immediately if you experience sudden-onset chest pain, arm weakness/numbness, slurred speech, or shortness of breath     Discharge Instruction - Breathing exercises    Perform breathing exercises using your Incentive Spirometer 10 times per hour while awake for 2 weeks.     Symptoms - Fever Management    A " low grade fever can be expected after surgery.  Use acetaminophen (TYLENOL) as needed for fever management.  Contact your Surgeon Team if you have a fever greater than 101.5 F, chills, and/or night sweats.     Symptoms - Constipation management    Constipation (hard, dry bowel movements) is expected after surgery due to the combination of being less active, the anesthetic, and the opioid pain medication.  You can do the following to help reduce constipation:  ~  FLUIDS:  Drink clear liquids (water or Gatorade), or juice (apple/prune).  ~  DIET:  Eat a fiber rich diet.    ~  ACTIVITY:  Get up and move around several times a day.  Increase your activity as you are able.  MEDICATIONS:  Reduce the risk of constipation by starting medications before you are constipated.  You can take Miralax   (1 packet as directed) and/or a stool softener (Senokot 1-2 tablets 1-2 times a day).  If you already have constipation and these medications are not working, you can get magnesium citrate and use as directed.  If you continue to have constipation you can try an over the counter suppository or enema.  Call your Surgeon Team if it has been greater than 3 days since your last bowel movement.     Symptoms - Reduced Urine Output    Changes in the amount of fluids you drank before and after surgery may result in problems urinating.  It is important to stay well-hydrated after surgery and drink plenty of water. If it has been greater than 8 hours since you have urinated despite drinking plenty of water, call your Surgeon Team.     Comfort and Pain Management - Pain after Surgery    Pain after surgery is normal and expected.  You will have some amount of pain for several weeks after surgery.  Your pain will improve with time.  There are several things you can do to help reduce your pain including: rest, ice, elevation, and using pain medications as needed. Contact your Surgeon Team if you have pain that persists or worsens after surgery  despite rest, ice, elevation, and taking your medication(s) as prescribed. Contact your Surgeon Team if you have new numbness, tingling, or weakness in your operative extremity.     Comfort and Pain Management - Swelling after Surgery    Swelling and/or bruising of the surgical extremity is common and may persist for several months after surgery. In addition to frequent icing and elevation, gentle compressive support with an ACE wrap or tubigrip may help with swelling. Apply compression regularly, removing at least twice daily to perform skin checks. Contact your Surgeon Team if your swelling increases and is NOT associated with an increase in your activity level, or if your swelling increases and is associated with redness and pain.     Comfort and Pain Management - Cold therapy    Ice can be used to control swelling and discomfort after surgery. Place a thin towel over your operative site and apply the ice pack overtop. Leave ice pack in place for 20 minutes, then remove for 20 minutes. Repeat this 20 minutes on/20 minutes off routine as often as tolerated.     Medication Instructions - Acetaminophen (TYLENOL) Instructions    You were discharged with acetaminophen (TYLENOL) for pain management after surgery. Acetaminophen most effectively manages pain symptoms when it is taken on a schedule without missing doses (every four, six, or eight hours). Your Provider will prescribe a safe daily dose between 3000 - 4000 mg.  Do NOT exceed this daily dose. Most patients use acetaminophen for pain control for the first four weeks after surgery.  You can wean from this medication as your pain decreases.     Medication Instructions - NSAID Instructions    You were discharged with an anti-inflammatory medication for pain management to use in combination with acetaminophen (TYLENOL) and the narcotic pain medication.  Take this medication exactly as directed.  You should only take one anti-inflammatory at a time.  Some common  "anti-inflammatories include: ibuprofen (ADVIL, MOTRIN), naproxen (ALEVE, NAPROSYN), celecoxib (CELEBREX), meloxicam (MOBIC), ketorolac (TORADOL).  Take this medication with food and water.     Follow Up Care    Follow-up with your Surgeon Team in 3 weeks for suture removal    Call [phone] for appt or call Dr Winchester team voicemail at [phone]     General info for SNF    Length of Stay Estimate: Short Term Care: Estimated # of Days <30 Condition at Discharge: Improving Level of care:skilled  Rehabilitation Potential: Excellent Admission H&P remains valid and up-to-date: Yes Recent Chemotherapy: N/A Use Nursing Home Standing Orders: Yes     Mantoux Instructions    Give two-step Mantoux (PPD) Per Facility Policy {[id number]     Incentive Spirometry    Incentive Spirometry 10 times per hour, 4 times per day.     Reason for your hospital stay    Spine surgery     When to call - Contact Surgeon Team    You may experience symptoms that require follow-up before your scheduled appointment. Refer to the \"Stoplight Tool\" for instructions on when to contact your Surgeon Team if you are concerned about pain control, blood clots, constipation, or if you are unable to urinate.     When to call - Reach out to Urgent Care    If you are not able to reach your Surgeon Team and you need immediate care, go to the Orthopedic Walk-in Clinic or Urgent Care at your Surgeon's office.  Do NOT go to the Emergency Room unless you have shortness of breath, chest pain, or other signs of a medical emergency.     When to call - Reasons to Call 911    Call 911 immediately if you experience sudden-onset chest pain, arm weakness/numbness, slurred speech, or shortness of breath     Symptoms - Fever Management    A low grade fever can be expected after surgery.  Use acetaminophen (TYLENOL) as needed for fever management.  Contact your Surgeon Team if you have a fever greater than 101.5 F, chills, and/or night sweats.     Symptoms - Constipation " management    Constipation (hard, dry bowel movements) is expected after surgery due to the combination of being less active, the anesthetic, and the opioid pain medication.  You can do the following to help reduce constipation:  ~  FLUIDS:  Drink clear liquids (water or Gatorade), or juice (apple/prune).  ~  DIET:  Eat a fiber rich diet.    ~  ACTIVITY:  Get up and move around several times a day.  Increase your activity as you are able.  MEDICATIONS:  Reduce the risk of constipation by starting medications before you are constipated.  You can take Miralax   (1 packet as directed) and/or a stool softener (Senokot 1-2 tablets 1-2 times a day).  If you already have constipation and these medications are not working, you can get magnesium citrate and use as directed.  If you continue to have constipation you can try an over the counter suppository or enema.  Call your Surgeon Team if it has been greater than 3 days since your last bowel movement.     Symptoms - Reduced Urine Output    Changes in the amount of fluids you drank before and after surgery may result in problems urinating.  It is important to stay well-hydrated after surgery and drink plenty of water. If it has been greater than 8 hours since you have urinated despite drinking plenty of water, call your Surgeon Team.     Activity - Exercises to prevent blood clots    Unless otherwise directed by your Surgeon team, perform the following exercises at least three times per day for the first four weeks after surgery to prevent blood clots in your legs: 1) Point and flex your feet (Ankle Pumps), 2) Move your ankle around in big circles, 3) Wiggle your toes, 4) Walk, even for short distances, several times a day, will help decrease the risk of blood clots.     Comfort and Pain Management - Pain after Surgery    Pain after surgery is normal and expected.  You will have some amount of pain for several weeks after surgery.  Your pain will improve with time.  There  are several things you can do to help reduce your pain including: rest, ice, elevation, and using pain medications as needed. Contact your Surgeon Team if you have pain that persists or worsens after surgery despite rest, ice, elevation, and taking your medication(s) as prescribed. Contact your Surgeon Team if you have new numbness, tingling, or weakness in your operative extremity.     Comfort and Pain Management - Swelling after Surgery    Swelling and/or bruising of the surgical extremity is common and may persist for several months after surgery. In addition to frequent icing and elevation, gentle compressive support with an ACE wrap or tubigrip may help with swelling. Apply compression regularly, removing at least twice daily to perform skin checks. Contact your Surgeon Team if your swelling increases and is NOT associated with an increase in your activity level, or if your swelling increases and is associated with redness and pain.     Comfort and Pain Management - Cold therapy    Ice can be used to control swelling and discomfort after surgery. Place a thin towel over your operative site and apply the ice pack overtop. Leave ice pack in place for 20 minutes, then remove for 20 minutes. Repeat this 20 minutes on/20 minutes off routine as often as tolerated.     Medication Instructions - Acetaminophen (TYLENOL) Instructions    You were discharged with acetaminophen (TYLENOL) for pain management after surgery. Acetaminophen most effectively manages pain symptoms when it is taken on a schedule without missing doses (every four, six, or eight hours). Your Provider will prescribe a safe daily dose between 3000 - 4000 mg.  Do NOT exceed this daily dose. Most patients use acetaminophen for pain control for the first four weeks after surgery.  You can wean from this medication as your pain decreases.     Medication Instructions - NSAID Instructions    You were discharged with an anti-inflammatory medication for pain  "management to use in combination with acetaminophen (TYLENOL) and the narcotic pain medication.  Take this medication exactly as directed.  You should only take one anti-inflammatory at a time.  Some common anti-inflammatories include: ibuprofen (ADVIL, MOTRIN), naproxen (ALEVE, NAPROSYN), celecoxib (CELEBREX), meloxicam (MOBIC), ketorolac (TORADOL).  Take this medication with food and water.     Shower with wound/dressing covered    You must COVER your dressing or incision with saran wrap (or any other non-permeable covering) to allow the incision to remain dry while showering.  You may shower 3 days after surgery as long as the surgical wound stays dry. Continue to cover your dressing or incision for showering until your first office visit.  You are strictly prohibited from soaking   or submerging the surgical wound underwater.     Medication instructions - No pharmacologic VTE prophylaxis prescribed    Your Surgeon did not prescribe medication for anticoagulation.     Comfort and Pain Management - LOWER Extremity Elevation    Swelling is expected for several months after surgery. This type of swelling is usually associated with gravity and activity, and can be improved with elevation.   The best way to do this is to get your \"toes above your nose\" by laying down and placing several pillows lengthwise under your calf and heel. When elevating your leg keep your knee completely straight. Perform this elevation as often as possible especially for the first two weeks after surgery.     General info for SNF    Length of Stay Estimate: Short Term Care: Estimated # of Days <30  Condition at Discharge: Improving  Level of care:skilled   Rehabilitation Potential: Good  Admission H&P remains valid and up-to-date: Yes  Recent Chemotherapy: N/A  Use Nursing Home Standing Orders: Yes     Mantoux instructions    Give two-step Mantoux (PPD) Per Facility Policy Yes     Reason for your hospital stay    Postoperative dural tear and " fluid collection, status post drainage and durotomy.  Patient with right foot drop.     Follow Up and recommended labs and tests    Follow up with Nursing home physician.    Follow up with [facility] per their orders.     Full Code     Physical Therapy Adult Consult    Evaluate and treat as clinically indicated.    Reason: Status Post Hip Surgery     Occupational Therapy Adult Consult    Evaluate and treat as clinically indicated.    Reason: Status Post Hip Surgery     Discharge Instruction - Regular Diet Adult    Return to your pre-surgery diet unless instructed otherwise     Diet    Follow this diet upon discharge: Current Diet:Orders Placed This Encounter      Discharge Instruction - Regular Diet Adult      Advance Diet as Tolerated: Regular Diet Adult     Diet    Follow this diet upon discharge: Current Diet:Orders Placed This Encounter      Discharge Instruction - Regular Diet Adult      Advance Diet as Tolerated: Regular Diet Adult      Diet       Significant Results and Procedures   Most Recent 3 CBC's:  Recent Labs   Lab Test 01/02/25  0049 12/28/24  0656 12/27/24  1429   WBC 5.1 4.1 7.1   HGB 9.5* 8.8* 9.5*   MCV 89 92 92    295 354     Most Recent 3 BMP's:  Recent Labs   Lab Test 01/03/25  1140 01/02/25  0049 12/28/24  0656    140 140   POTASSIUM 3.5 3.9 4.1   CHLORIDE 97* 95* 103   CO2 31* 26 28   BUN 11.0 18.6 9.4   CR 0.70 1.14* 0.85   ANIONGAP 11 19* 9   PIPER 8.7* 9.3 9.2   GLC 99 72 98     Most Recent 2 LFT's:  Recent Labs   Lab Test 12/28/24  0656 06/27/24  0929   AST 18 25   ALT 11 15   ALKPHOS 78 67   BILITOTAL <0.2 0.4     7-Day Micro Results       No results found for the last 168 hours.          Most Recent Urinalysis:No lab results found.  Most Recent ESR & CRP:  Recent Labs   Lab Test 12/27/24  1429 02/03/23  1334   SED  --  15   CRPI 7.32* 5.71*   ,   Results for orders placed or performed during the hospital encounter of 12/27/24   CT Chest Pulmonary Embolism w  Contrast    Narrative    EXAM: CT CHEST PULMONARY EMBOLISM W CONTRAST  LOCATION: [facility]  DATE: 12/27/2024    INDICATION: chest pain, recent surgery  COMPARISON: None.  TECHNIQUE: CT chest pulmonary angiogram during arterial phase injection of IV contrast. Multiplanar reformats and MIP reconstructions were performed. Dose reduction techniques were used.   CONTRAST: Isovue 370 75mL    FINDINGS:  ANGIOGRAM CHEST: Pulmonary arteries are normal caliber and negative for pulmonary emboli. Although contrast bolus is somewhat limited, no evidence of thoracic aortic dissection. Fusiform dilatation of the ascending aorta reaching 3.7 cm. Normal caliber   descending aorta. No CT evidence of right heart strain.    LUNGS AND PLEURA: Interval resolution of pleural effusions. Mild intralobular septal thickening with scattered areas of air trapping, and groundglass opacity, and bronchial wall thickening.    MEDIASTINUM/AXILLAE: No significant mediastinal or hilar adenopathy.    CORONARY ARTERY CALCIFICATION: Moderate.    UPPER ABDOMEN: No acute abnormalities    MUSCULOSKELETAL: Thoracolumbar scoliosis and moderate lumbar spondylosis. New moderate elevation of the left hemidiaphragm.      Impression    IMPRESSION:  1.  No evidence of pulmonary embolus.  2.  Areas of air trapping, groundglass opacity and bronchial wall thickening compatible with central airway inflammation.   MR Lumbar Spine w/o & w Contrast    Narrative    EXAM: MR LUMBAR SPINE W/O and W CONTRAST  LOCATION: [facility]  DATE: 12/27/2024    INDICATION: Dural leak, back surgery 1 month ago here with wound leak.  COMPARISON: MRI lumbar spine 9/19/2024.  CONTRAST: 6.2 mL Gadavist  TECHNIQUE: Routine Lumbar Spine MRI without and with IV contrast.    FINDINGS: Nomenclature is based on 5 lumbar type vertebral bodies. Moderate convex right lumbar rotary curvature measuring 33 degrees from the superior L3 to the inferior  L5 endplate. Slight rightward subluxation L3 on L4. Mild accentuation of the normal   lumbar lordosis. Stable grade 1 degenerative anterolisthesis of L5 on S1.    Patient is status post interval L4-5 laminectomy and interbody and posterior instrumented fusion at this level. There is a large fluid collection surrounding the posterior aspect of the hardware and extending from the dural margin to the skin surface.   The collection measures up to 7.4 x 8.7 x 7.9 cm in size (CC, TRV, AP). The collection bows the dura anteriorly at the L4-5 level suggesting that the collection is under pressure. There is enhancement along the periphery of the collection which is   nonspecific and although compatible with post surgical change, superinfection cannot be excluded on the basis of this exam.    There is new T2 prolongation and T1 prolongation along the dorsal aspect of the lumbar vertebral bodies which demonstrates relatively uniform enhancement on the post gadolinium images and is favored to reflect venous dilatation. Epidural collections are   considered less likely.    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 Linear enhancement along the clumped nerve roots is favored to be vascular in etiology, although a component of arachnoiditis cannot be excluded.    Although the L4-5 disc is not well seen secondary to susceptibility effects, no abnormal fluid signal or enhancement is seen throughout the remaining lumbar or thoracic discs to strongly suggest discitis/osteomyelitis. No abnormal marrow enhancement is   appreciated (allowing for susceptibility effects at L4 and L5).    Psoas muscles are normal in appearance. Visualized aorta is normal in caliber. Fluid fluid level within the gallbladder compatible with layering stones or milk of calcium.    Lower Thoracic Levels: Advanced interspace narrowing T10-T11 and T11-T12. Loss of normal disc T2 prolongation and shallow  posterior disc herniations are present at these levels. No spinal canal or neural foraminal stenosis.    T12-L1: Grade 1 retrolisthesis T12 on L1. Endplate irregularity and mixed Modic type I and Modic type II changes. Loss of disc T2 prolongation and shallow posterior disc bulge. No facet arthropathy. No spinal canal stenosis. No right foraminal stenosis.   No left foraminal stenosis.    L1-L2: Grade 1 retrolisthesis L1 on L2. Advanced interspace narrowing. Loss of normal disc T2 prolongation. Shallow posterior disc bulge. Moderate left and mild right-sided facet arthropathy. No spinal canal stenosis. Moderate left foraminal stenosis. No   right foraminal stenosis.    L2-L3: Grade 1 retrolisthesis L2 on L3. Moderate interspace narrowing. Loss of normal disc T2 prolongation. Posterior interbody spurring and shallow posterior disc bulge. Mild facet arthropathy. No spinal canal or neural foraminal stenosis.    L3-L4: Grade 1 retrolisthesis L3 on L4. Advanced interspace narrowing. Loss of normal disc T2 prolongation. Posterior interbody ridging and shallow posterior disc bulge. At least mild facet arthropathy. No spinal canal stenosis. No neural foraminal   stenosis.    L4-L5: Interbody and posterior instrumented fusion. Normal disc height. Shallow posterior disc bulge. Spinal canal and right lateral recess have been decompressed dorsally. No bony canal stenosis. Dorsal epidural collection eccentric to the right exerts   mass effect upon the posterior margin of the exiting right L4 nerve root (sagittal image 11). No left foraminal stenosis.    L5-S1: Grade 1 anterolisthesis of L5 on S1. Mild interspace narrowing. Loss of normal disc T2 prolongation. Shallow posterior disc bulge. Mild left and more moderate right-sided facet arthropathy. Shallow left-sided facet effusion. No spinal canal   stenosis. No left foraminal stenosis. Apparent mild right-sided foraminal stenosis likely exaggerated by susceptibility effects from  hardware.      Impression    IMPRESSION:  1.  Interval laminectomy and partial facetectomy on the right at L4-5 and interval interbody and posterior instrumented fusion at this level.    2.  Large fluid collection extends along the posterior margin of the hardware extending from the dural margin to the skin surface. Ventral aspect of the collection bows the dura anteriorly at the L4-5 level suggesting that the collection is under   pressure. By report, the patient has a history of a CSF leak, and this could reflect a large CSF collection. Superinfection cannot be excluded on the basis of this exam.    3.  There is new T1 shortening and T2 prolongation along the dorsal margin of the lumbar vertebral bodies which demonstrates relatively uniform enhancement and is favored to reflect venous dilatation and may relate to decreased CSF pressure/hypotension.   Epidural collections are considered less likely, but cannot be excluded.    4.  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    5.  Advanced lumbar spondylosis with moderate convex right lumbar rotary curvature and multilevel listheses as described.    6.  Layering stones or milk of calcium is seen within the gallbladder, new compared to the 9/19/2024 prior.    Large collection with mass effect upon the dura, apparent venous dilatation, and arachnoiditis discussed with Dr. Oliver at 1623 hours.   CT Head w/o Contrast    Narrative    EXAM: CT HEAD W/O CONTRAST  LOCATION: [facility]  DATE: 12/27/2024    INDICATION: Headache, postional, vomiting  COMPARISON: None.  TECHNIQUE: Routine CT Head without IV contrast. Multiplanar reformats. Dose reduction techniques were used.    FINDINGS:  INTRACRANIAL CONTENTS: No intracranial hemorrhage, extraaxial collection, or mass effect.  No CT evidence of acute infarct. Mild presumed chronic small vessel ischemic changes. Normal ventricles and sulci.     VISUALIZED  ORBITS/SINUSES/MASTOIDS: No intraorbital abnormality. No paranasal sinus mucosal disease. No middle ear or mastoid effusion.    BONES/SOFT TISSUES: No acute abnormality.      Impression    IMPRESSION:  1.  No CT evidence for acute intracranial process.  2.  Mild chronic microvascular ischemic changes as above.   XR Surgery LILIAN  Fluoro G/T 5 Min    Narrative    This exam was marked as non-reportable because it will not be read by a   radiologist or a [place] non-radiologist provider.         US Lower Extremity Venous Duplex Right    Narrative    EXAM: US LOWER EXTREMITY VENOUS DUPLEX RIGHT  LOCATION: [facility]  DATE: 12/31/2024    INDICATION: Right lower leg calf pain. POD #1. Not on anticoagulation.  COMPARISON: None.  TECHNIQUE: Venous Duplex ultrasound of the right lower extremity with and without compression, augmentation and duplex. Color flow and spectral Doppler with waveform analysis performed.    FINDINGS: Exam includes the common femoral, femoral, popliteal, and contralateral common femoral veins as well as segmentally visualized deep calf veins and greater saphenous vein.     RIGHT: No deep vein thrombosis. No superficial thrombophlebitis. No popliteal cyst.      Impression    IMPRESSION:  1.  No deep venous thrombosis in the right lower extremity.       Discharge Medications   Current Discharge Medication List        CONTINUE these medications which have CHANGED    Details   acetaminophen (TYLENOL) 325 MG tablet Take 2 tablets (650 mg) by mouth every 8 hours as needed for mild pain or fever.    Associated Diagnoses: Nonintractable headache, unspecified chronicity pattern, unspecified headache type; Dural tear; Spinal stenosis of lumbar region, unspecified whether neurogenic claudication present      oxyCODONE (ROXICODONE) 5 MG tablet Take 1 tablet (5 mg) by mouth every 4 hours as needed for moderate pain.  Qty: 20 tablet, Refills: 0    Associated Diagnoses: Nonintractable  headache, unspecified chronicity pattern, unspecified headache type; Dural tear; Spinal stenosis of lumbar region, unspecified whether neurogenic claudication present           CONTINUE these medications which have NOT CHANGED    Details   amoxicillin (AMOXIL) 500 MG capsule Take 2,000 mg by mouth as needed (prior to dental procedures).  Refills: 8      CALCIUM CITRATE/VITAMIN D3 (CALCIUM CITRATE + D ORAL) Take 2 tablets by mouth 2 times daily.      cholecalciferol, vitamin D3, (VITAMIN D3) 2,000 unit Tab Take 1 tablet by mouth every morning.      citalopram (CELEXA) 20 MG tablet Take 20 mg by mouth every morning.      furosemide (LASIX) 20 MG tablet Take 1 tablet (20 mg) by mouth daily.    Associated Diagnoses: Acute diastolic congestive heart failure (H)      gabapentin (NEURONTIN) 100 MG capsule Take 100 mg by mouth 3 times daily.      methocarbamol (ROBAXIN) 500 MG tablet Take 500 mg by mouth 3 times daily.      metoprolol succinate ER (TOPROL XL) 25 MG 24 hr tablet Take 0.5 tablets (12.5 mg) by mouth at bedtime. TO BE STARTED ON 12/7    Associated Diagnoses: Sinus tachycardia      multivitamin (MULTIVITAMIN) per tablet Take 1 tablet by mouth every morning.      omeprazole (PRILOSEC) 20 MG capsule [OMEPRAZOLE (PRILOSEC) 20 MG CAPSULE] Take 20 mg by mouth daily before breakfast.      senna-docusate (SENOKOT-S/PERICOLACE) 8.6-50 MG tablet Take 1 tablet by mouth 2 times daily as needed for constipation.           STOP taking these medications       ibuprofen (ADVIL/MOTRIN) 200 MG tablet Comments:   Reason for Stopping:         methylPREDNISolone (MEDROL DOSEPAK) 4 MG tablet therapy pack Comments:   Reason for Stopping:             Allergies   Allergies   Allergen Reactions    Ibuprofen Other (See Comments)     Stomach Bleed     Other Environmental Allergy Other (See Comments)     Bandaids cause redness

## 2025-01-06 NOTE — PLAN OF CARE
Patient vital signs are at baseline: Yes  Patient able to ambulate as they were prior to admission or with assist devices provided by therapies during their stay:  Yes  Patient MUST void prior to discharge:  Yes  Patient able to tolerate oral intake:  Yes  Pain has adequate pain control using Oral analgesics:  Yes  Does patient have an identified :  Yes  Has goal D/C date and time been discussed with patient:  Yes    Pt had a shower today. Dressing removed by ortho and is RACQUEL and intact, no drainage.  Pt discharging to TCU this afternoon with friend transporting.

## 2025-01-06 NOTE — PLAN OF CARE
Patient vital signs are at baseline: Yes  Patient able to ambulate as they were prior to admission or with assist devices provided by therapies during their stay:  Yes  Patient MUST void prior to discharge:  Yes  Patient able to tolerate oral intake:  Yes  Pain has adequate pain control using Oral analgesics:  Yes  Does patient have an identified :  Yes  Has goal D/C date and time been discussed with patient:  Yes    Waiting on TCU; [facility] Lara accepting today.      Problem: Adult Inpatient Plan of Care  Goal: Readiness for Transition of Care  Outcome: Progressing     Problem: Nausea and Vomiting  Goal: Nausea and Vomiting Relief  Outcome: Progressing     Problem: Pain Acute  Goal: Optimal Pain Control and Function  Outcome: Progressing  Intervention: Develop Pain Management Plan  Recent Flowsheet Documentation  Taken 1/6/2025 0000 by Shawn Mayers RN  Pain Management Interventions: rest  Intervention: Prevent or Manage Pain  Recent Flowsheet Documentation  Taken 1/6/2025 0000 by Shawn Mayers RN  Medication Review/Management: medications reviewed     Problem: Spinal Surgery  Goal: Optimal Pain Control and Function  Outcome: Progressing  Intervention: Prevent or Manage Pain  Recent Flowsheet Documentation  Taken 1/6/2025 0000 by Shawn Mayers RN  Pain Management Interventions: rest  Goal: Nausea and Vomiting Relief  Outcome: Progressing  Goal: Effective Oxygenation and Ventilation  Outcome: Progressing  Intervention: Optimize Oxygenation and Ventilation  Recent Flowsheet Documentation  Taken 1/6/2025 0000 by Shawn Mayers RN  Head of Bed (HOB) Positioning: HOB at 20-30 degrees

## 2025-02-06 ENCOUNTER — LAB REQUISITION (OUTPATIENT)
Dept: LAB | Facility: CLINIC | Age: 80
End: 2025-02-06
Payer: COMMERCIAL

## 2025-02-06 DIAGNOSIS — D50.9 IRON DEFICIENCY ANEMIA, UNSPECIFIED: ICD-10-CM

## 2025-02-06 DIAGNOSIS — I50.32 CHRONIC DIASTOLIC (CONGESTIVE) HEART FAILURE (H): ICD-10-CM

## 2025-02-06 PROCEDURE — 80048 BASIC METABOLIC PNL TOTAL CA: CPT | Mod: ORL | Performed by: FAMILY MEDICINE

## 2025-02-06 PROCEDURE — 82728 ASSAY OF FERRITIN: CPT | Mod: ORL | Performed by: FAMILY MEDICINE

## 2025-02-07 LAB
ANION GAP SERPL CALCULATED.3IONS-SCNC: 15 MMOL/L (ref 7–15)
BUN SERPL-MCNC: 12.5 MG/DL (ref 8–23)
CALCIUM SERPL-MCNC: 9.7 MG/DL (ref 8.8–10.4)
CHLORIDE SERPL-SCNC: 99 MMOL/L (ref 98–107)
CREAT SERPL-MCNC: 0.89 MG/DL (ref 0.51–0.95)
EGFRCR SERPLBLD CKD-EPI 2021: 66 ML/MIN/1.73M2
FERRITIN SERPL-MCNC: 41 NG/ML (ref 11–328)
GLUCOSE SERPL-MCNC: 97 MG/DL (ref 70–99)
HCO3 SERPL-SCNC: 25 MMOL/L (ref 22–29)
POTASSIUM SERPL-SCNC: 3.8 MMOL/L (ref 3.4–5.3)
SODIUM SERPL-SCNC: 139 MMOL/L (ref 135–145)

## 2025-02-18 ENCOUNTER — OFFICE VISIT (OUTPATIENT)
Dept: INTERNAL MEDICINE | Facility: CLINIC | Age: 80
End: 2025-02-18
Payer: COMMERCIAL

## 2025-02-18 VITALS — RESPIRATION RATE: 16 BRPM | SYSTOLIC BLOOD PRESSURE: 118 MMHG | TEMPERATURE: 97.2 F | DIASTOLIC BLOOD PRESSURE: 72 MMHG

## 2025-02-18 DIAGNOSIS — F32.A DEPRESSION, UNSPECIFIED DEPRESSION TYPE: ICD-10-CM

## 2025-02-18 DIAGNOSIS — K21.00 GASTROESOPHAGEAL REFLUX DISEASE WITH ESOPHAGITIS WITHOUT HEMORRHAGE: ICD-10-CM

## 2025-02-18 DIAGNOSIS — M54.2 NECK PAIN ON RIGHT SIDE: ICD-10-CM

## 2025-02-18 DIAGNOSIS — T14.8XXA WOUND DRAINAGE: ICD-10-CM

## 2025-02-18 DIAGNOSIS — M81.0 SENILE OSTEOPOROSIS: Primary | ICD-10-CM

## 2025-02-18 PROCEDURE — 99214 OFFICE O/P EST MOD 30 MIN: CPT | Mod: 25 | Performed by: INTERNAL MEDICINE

## 2025-02-18 PROCEDURE — 96372 THER/PROPH/DIAG INJ SC/IM: CPT | Performed by: INTERNAL MEDICINE

## 2025-02-18 RX ORDER — PROCHLORPERAZINE MALEATE 5 MG/1
5 TABLET ORAL EVERY 6 HOURS PRN
COMMUNITY

## 2025-02-18 NOTE — PROGRESS NOTES
(M81.0) Osteoporosis Senile  (primary encounter diagnosis)  Prior Prolia, was on Fosamax. Tolerating Prolia well for 11 years. DXA from 5/2023 reviewed.   She has h/o patellar fracture, 4th MCP right hand, right nasal bones.  Component      Latest Ref Rng 2/6/2025  1:52 PM   Sodium      135 - 145 mmol/L 139    Potassium      3.4 - 5.3 mmol/L 3.8    Chloride      98 - 107 mmol/L 99    Carbon Dioxide (CO2)      22 - 29 mmol/L 25    Anion Gap      7 - 15 mmol/L 15    Urea Nitrogen      8.0 - 23.0 mg/dL 12.5    Creatinine      0.51 - 0.95 mg/dL 0.89    GFR Estimate      >60 mL/min/1.73m2 66    Calcium      8.8 - 10.4 mg/dL 9.7    Glucose      70 - 99 mg/dL 97        Plan: DX Bone Density            (K21.00) Gastroesophageal reflux disease with esophagitis without hemorrhage  Comment: stable on omeprazole  Acid blocking medications, such as Aciphex, Nexium, Prevacid, Protonix, Prilosec and Nexium are commonly used for heartburn. These medications can increase osteoporosis and fracture risk. A large study of more than 13,000 patients concluded that hip fracture risk increases by 22% after one year of taking the medication and by 54% after 3 years.      (F32.A) Depression, unspecified depression type  Comment: stable on Celexa  Taking SSRI s is associated with lower bone mineral density in children and adults. In a study of adults over the age of 50, use of SSRIs not only lowered bone mineral density but they also increased the odds of falling and doubled the risk of osteoporosis fractures.     (T14.8XXA) Wound drainage  Comment: she was in the hospital 12/27/24-1/6/25 with wound drainage s/p L4-L5 decompression surgery  and repair of spinal fluid leak/pseudomeningocele.   I examined the wound today and it is nicely healed, dry, no infection. We can safely proceed with Prolia.      (M54.2) Neck pain on right side  Comment: she woke up this morning with right sided neck pain and stiffness. Negative Spurling, but  restricted range of motion.  She will use stretching exercise and OTC topical agents. If not better, we can do oral muscle relaxant.    The longitudinal plan of care for the diagnosis(es)/condition(s) as documented were addressed during this visit. Due to the added complexity in care, I will continue to support Pam in the subsequent management and with ongoing continuity of care.  Patient was educated on safety of Prolia utilizing Patient Counseling Chart for Healthcare Providers, as outlined by the Prolia REMS progam.     Return in about 6 months (around 8/18/2025) for Prolia with CSS.    Patient Instructions   Prolia 22nd today.  Prolia 23rd in 6 months with my nurse. I will see you in 1 year    DXA in 5/2025 .   Phone number to schedule [phone].    Daily calcium need is 6740-9488 mg a day from the diet and supplements.  Calcium citrate is easier to digest.  Vitamin D 2000 IU daily recommended.    Risk of rebound vertebral fractures is higher when Prolia suddenly stopped or dose was missed.      Prolia and Covid vaccine should be  for at least a week.    Patient education:  Patients advised to maintain good oral hygiene during treatment, because of the risk for osteonecrosis of the jaw.   The risk of osteonecrosis of the jaw with Prolia therapy is extremely low.   If a patient is late for Prolia therapy (>3 wks) a rapid rebound of bone loss can occur which is highly concerning and important to try to prevent to optimize bone health.   Therefore if an invasive dental procedure is required, primarily extraction or implant, while on Prolia therapy, the recommendation is to time the dental procedure optimally 4 months after the most recent dose of Prolia allowing 6-8 weeks for healing prior to next dose of Prolia.   This is based on the updated 2022 Recommendations from the [employer] Association of Oral and Maxillof[employer] Sur[employer].   We also recommend discussing with dentist or oral surgeon if  pretreatment prophylactic oral rinses and antibiotics would be beneficial.   Optimizing oral hygiene before any invasive dental procedure significantly lowers ONJ risk.     There is a greater risk of severe hypocalcemia following denosumab administration and patient is advised that we will have to monitor calcium, phosphorous, and magnesium levels. Risk of infection is increased with denosumab use, so patient will inform me if has more frequent infections.     Atypical femur fracture  has been reported in patients receiving denosumab. The fractures may occur anywhere along the femoral shaft (may be bilateral) and commonly occur with minimal to no trauma to the area. Some patients experience prodromal thigh or groin pain weeks or months before the fracture occurs. Contralateral limb should be assessed if AFF occurs.     Multiple vertebral column fracture has been reported following discontinuation of therapy. Hypertension and increased cholesterol were reported with denosumab use.      Discussed 10-year data of Prolia. Discussed the importance of being on time and consistent with Prolia use to prevent rapid bone of osteoclastic activity.  Discussed that if Prolia is discontinued we will likely need to utilize an antiresorptive, such as Reclast, to help prevent rapid rebound of osteoclast activity. Often more than 1 dose is required.  Labs yearly to ensure calcium and vitamin D optimized while patient on Prolia.            /72   Temp 97.2  F (36.2  C) (Temporal)   Resp 16   LMP  (LMP Unknown)       Did you experience any problems with previous Prolia injection? no  Any medication change in the last 6 months? no  Did you take prednisone or other immunosupressant drugs in the last 6 months   (chemo, transplant, rheum, dermatology conditions)? no  Did you have any serious infection in the last 6 months?no  Any recent hospitalizations?no  Do you plan any dental work in the next 2-3 months?no  How much calcium do  you take daily from the diet and supplements?1200 mg  How much vit D do you take daily? 2000 IU  Last DXA? 5/2023 Reviewed and discussed      Patient is here today for the Prolia injection. Patient tolerated previous injections well.   We discussed calcium and vit D daily needs today.       We discussed high risk of rebound vertebral fractures when Prolia suddenly stopped.    Next Prolia injection will be in 6 months.           This note has been dictated using voice recognition software. Any grammatical or context distortions are unintentional and inherent to the software      Patient Active Problem List   Diagnosis    Benign Essential Hypertension    Osteoporosis Senile    Osteoarthritis of right knee    Depression    Anxiety    GERD (gastroesophageal reflux disease)    History of patellar fracture    Lumbar spinal stenosis    Wound drainage    Nonintractable headache, unspecified chronicity pattern, unspecified headache type       Current Outpatient Medications   Medication Sig Dispense Refill    acetaminophen (TYLENOL) 325 MG tablet Take 2 tablets (650 mg) by mouth every 8 hours as needed for mild pain or fever.      amoxicillin (AMOXIL) 500 MG capsule Take 2,000 mg by mouth as needed (prior to dental procedures).  8    CALCIUM CITRATE/VITAMIN D3 (CALCIUM CITRATE + D ORAL) Take 2 tablets by mouth 2 times daily.      cholecalciferol, vitamin D3, (VITAMIN D3) 2,000 unit Tab Take 1 tablet by mouth every morning.      citalopram (CELEXA) 20 MG tablet Take 20 mg by mouth every morning.      metoprolol succinate ER (TOPROL XL) 25 MG 24 hr tablet Take 0.5 tablets (12.5 mg) by mouth at bedtime. TO BE STARTED ON 12/7      multivitamin (MULTIVITAMIN) per tablet Take 1 tablet by mouth every morning.      omeprazole (PRILOSEC) 20 MG capsule [OMEPRAZOLE (PRILOSEC) 20 MG CAPSULE] Take 20 mg by mouth daily before breakfast.      prochlorperazine (COMPAZINE) 5 MG tablet Take 5 mg by mouth every 6 hours as needed for nausea or  vomiting.      senna-docusate (SENOKOT-S/PERICOLACE) 8.6-50 MG tablet Take 1 tablet by mouth 2 times daily as needed for constipation.       Current Facility-Administered Medications   Medication Dose Route Frequency Provider Last Rate Last Admin    denosumab (PROLIA) injection 60 mg  60 mg Subcutaneous Q6 Months

## 2025-02-18 NOTE — PATIENT INSTRUCTIONS
DATE: 7/30/2024  PATIENT: Marietta Fuller  YOB: 1950  MRN: [id number]  PCP: Leigh Ann Ahn MD  HEME/ONC PROVIDER: Sandy strauss        Diagnosis and Stage   Cancer Staging   Malignant neoplasm of upper-outer quadrant of right breast in female, estrogen receptor positive  (CMD)  Staging form: Breast, AJCC 8th Edition  - Clinical stage from 3/3/2023: Stage IA (cT1c, cN0, cM0, G1, ER+, WI+, HER2-) - Signed by Sandy Strauss MD on 3/3/2023    Cancer Genomics:    Genetics Testing: Neg  Goal of Treatment: cure        CHIEF COMPLAINT  Marietta Fuller is a 73 year old female who is here for the follow up of Rt breast cancer        HISTORY OF PRESENT ILLNESS    This is a pleasant 72-year-old female who was seen in the [facility] clinic for screen detected right breast cancer.  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  The second biopsy right breast at 10:00 9 cm from the nipple showed a fragmented lymph node with focal metastatic adenocarcinoma.  Her estrogen receptor was greater than 95% progesterone was positive 25% and HER2/rafael was 0 negative Ki-67 was 15 to 20%.  Patient denies any pain any nipple discharge.  She does complain of some chronic low back pain  Her comorbidities include hypertension diabetes and hyperlipidemia  She had a prior history of breast biopsy in 1994 which was negative she had a  Prolia 22nd today.  Prolia 23rd in 6 months with my nurse. I will see you in 1 year    DXA in 5/2025 .   Phone number to schedule [phone].    Daily calcium need is 8041-9193 mg a day from the diet and supplements.  Calcium citrate is easier to digest.  Vitamin D 2000 IU daily recommended.    Risk of rebound vertebral fractures is higher when Prolia suddenly stopped or dose was missed.      Prolia and Covid vaccine should be  for at least a week.    Patient education:  Patients advised to maintain good oral hygiene during treatment, because of the risk for osteonecrosis of the jaw.   The risk of osteonecrosis of the jaw with Prolia therapy is extremely low.   If a patient is late for Prolia therapy (>3 wks) a rapid rebound of bone loss can occur which is highly concerning and important to try to prevent to optimize bone health.   Therefore if an invasive dental procedure is required, primarily extraction or implant, while on Prolia therapy, the recommendation is to time the dental procedure optimally 4 months after the most recent dose of Prolia allowing 6-8 weeks for healing prior to next dose of Prolia.   This is based on the updated 2022 Recommendations from the American Association of Oral [employer] Maxill[employer].   We also recommend discussing with dentist or oral surgeon if pretreatment prophylactic oral rinses and antibiotics would be beneficial.   Optimizing oral hygiene before any invasive dental procedure significantly lowers ONJ risk.     There is a greater risk of severe hypocalcemia following denosumab administration and patient is advised that we will have to monitor calcium, phosphorous, and magnesium levels. Risk of infection is increased with denosumab use, so patient will inform me if has more frequent infections.     Atypical femur fracture  has been reported in patients receiving denosumab. The fractures may occur anywhere along the femoral shaft (may be bilateral) and commonly  lumpectomy in 2016 5/11/23  Comes for f/u of her rt breast ca  S/p rt mastectomy  4/27 for multifocal disease and is here to discuss further treatment  Final path showed mucinous carcinoma grade 1   1 intra mammary node was pos with LVI T1c N1 whit mo  ER and OK pos and her2 neg    7/5/23  Comes for f/u of breast ca on adjuvant arimidex and tolerating well has some hot flashes and joint tyrone  Which are mild   Had some issues with swelling at the surgical site and had several  aspirations and followed by Dr Davalos  11/6/23  Comes for f/u of breast ca  Did not get RT  Is on adjuvant arimidex and is tolerating well  3/7/24  Comes for f/u of breast ca on adjuvant arimidex and is tolerating well has some pain and stiffness lt knee  7/30/24  Comes for  breast ca  on adjuvant arimidex   and is tolerating well  Recently returned from visiting [place]            HEMATOLOGY/ONCOLOGY SUMMARY    As above    Patient's medications, allergies, past medical, surgical, social and family histories were reviewed and updated as appropriate.        REVIEW OF SYSTEMS    Psychosocial assessment was obtained. Intervention was not necessary.    ECOG Performance Status: 0    Pain Scale: 0    Treatment Related Toxicities: none    Review of Systems   Constitutional: Negative.    HENT: Negative.     Eyes: Negative.    Respiratory: Negative.     Cardiovascular: Negative.    Gastrointestinal: Negative.    Endocrine: Negative.    Genitourinary: Negative.    Musculoskeletal:  Positive for back pain.   Skin: Negative.    Allergic/Immunologic: Negative.    Neurological: Negative.    Hematological: Negative.    Psychiatric/Behavioral: Negative.     All other systems reviewed and are negative.          VITALS  Visit Vitals  BP (!) 158/63 (BP Location: RUE - Right upper extremity, Patient Position: Sitting, Cuff Size: Large Adult)   Pulse 95   Temp 98.1 °F (36.7 °C) (Oral)   Resp 16   Ht 5' 3\" (1.6 m)   Wt 78.9 kg (173 lb 15.1 oz)   LMP 06/01/2000  occur with minimal to no trauma to the area. Some patients experience prodromal thigh or groin pain weeks or months before the fracture occurs. Contralateral limb should be assessed if AFF occurs.     Multiple vertebral column fracture has been reported following discontinuation of therapy. Hypertension and increased cholesterol were reported with denosumab use.      Discussed 10-year data of Prolia. Discussed the importance of being on time and consistent with Prolia use to prevent rapid bone of osteoclastic activity.  Discussed that if Prolia is discontinued we will likely need to utilize an antiresorptive, such as Reclast, to help prevent rapid rebound of osteoclast activity. Often more than 1 dose is required.  Labs yearly to ensure calcium and vitamin D optimized while patient on Prolia.     (Approximate)   SpO2 98%   BMI 30.81 kg/m²     Body Surface Area: Body surface area is 1.82 meters squared.    PHYSICAL EXAM  Physical Exam  Constitutional:       Appearance: She is well-developed.   HENT:      Head: Normocephalic.      Neck: Normal range of motion and neck supple.   Eyes:      Pupils: Pupils are equal, round, and reactive to light.   Cardiovascular:      Rate and Rhythm: Normal rate and regular rhythm.      Heart sounds: Normal heart sounds.   Pulmonary:      Effort: Pulmonary effort is normal.      Breath sounds: Normal breath sounds. No rales.   Abdominal:      General: Bowel sounds are normal.      Palpations: Abdomen is soft.   Musculoskeletal:         General: Normal range of motion.   Skin:     General: Skin is warm.   Neurological:      Mental Status: She is alert and oriented to person, place, and time.   Psychiatric:         Behavior: Behavior normal.       Rt mastectomy noted lt breast neg for masses and no axillary adenopathy  LABS    WBC (K/mcL)   Date Value   04/28/2023 8.9     HCT (%)   Date Value   04/28/2023 34.4 (L)     HGB (g/dL)   Date Value   04/28/2023 11.2 (L)         PLT (K/mcL)   Date Value   04/28/2023 192     Glucose (mg/dL)   Date Value   05/03/2024 91     Sodium (mmol/L)   Date Value   05/03/2024 143     Potassium (mmol/L)   Date Value   05/03/2024 4.7     Chloride (mmol/L)   Date Value   05/03/2024 110     Carbon Dioxide (mmol/L)   Date Value   05/03/2024 28     BUN (mg/dL)   Date Value   05/03/2024 17     Creatinine (mg/dL)   Date Value   05/03/2024 0.81     Protein, Total (g/dL)   Date Value   04/12/2024 7.5     Albumin (g/dL)   Date Value   04/12/2024 4.1     Calcium (mg/dL)   Date Value   05/03/2024 10.2     Bilirubin, Total (mg/dL)   Date Value   04/12/2024 0.4     GOT/AST (Units/L)   Date Value   04/12/2024 15     Alkaline Phosphatase (Units/L)   Date Value   04/12/2024 69     GPT/ALT (Units/L)   Date Value   04/12/2024 18     Anion Gap (mmol/L)   Date Value    05/03/2024 10     Globulin (g/dL)   Date Value   04/12/2024 3.4     A/G Ratio (no units)   Date Value   04/12/2024 1.2         Pathologic Diagnosis  4/27/23   A.   First sentinel node, right axilla; biopsy:  - One lymph node negative for metastatic carcinoma (0/1)  - Deeper sections and keratin stain examined, negative for carcinoma     B.   Second sentinel node, right axilla; biopsy:  - One lymph node negative for metastatic carcinoma (0/1)  - Deeper sections and keratin stain examined, negative for carcinoma     C.   Third sentinel node, right axilla; biopsy:  - One lymph node negative for metastatic carcinoma (0/1)  - Deeper sections and keratin stain examined, negative for carcinoma     D.   Fourth right breast sentinel node, right axilla; biopsy:  - One lymph node negative for metastatic carcinoma (0/1)  - Deeper sections and keratin stain examined, negative for carcinoma     E.   Right breast; mastectomy:  -Mucinous carcinoma, grade 1, multifocal, measuring 18 mm, 16 mm and 9 mm in the largest dimension, respectively  -Ductal carcinoma in situ, cribriform, solid and micropapillary type, grade 1  -One intramammary lymph node positive for micrometastatic carcinoma (measuring 1.8 mm) with lymphovascular invasion identified in the subcapsular sinus  -Resection margins, skin and nipple negative for carcinoma  -Previous biopsy site x3 identified  -Uninvolved breast parenchyma with fibrocystic changes consisting of sclerosing adenosis, apocrine metaplasia, usual type ductal hyperplasia, stromal fibrosis, dilated ducts and calcifications  (See Comment)   Electronically signed by Lashawn Zhao MD on 5/1/2023 at 1425   Comment    The breast tumor markers were performed in the previous biopsy [id number], dated 2/22/2023.   Synoptic Checklist   INVASIVE CARCINOMA OF THE BREAST: Resection  8th Edition - Protocol posted: 12/14/2022  INVASIVE CARCINOMA OF THE BREAST: COMPLETE EXCISION - All Specimens  SPECIMEN   Procedure   Total mastectomy    Specimen Laterality  Right    TUMOR   Histologic Type  Mucinous carcinoma    Tumor Focality  Multiple foci of invasive carcinoma    Number of Foci  3    Sizes of Individual Foci in Millimeters (mm)  In largest dimension measuring 18; 16; 9    Ductal Carcinoma In Situ (DCIS)  Present    Architectural Patterns  Cribriform      Micropapillary      Solid    Nuclear Grade  Grade I (low)    Necrosis  Not identified    Number of Blocks with DCIS  5    Number of Blocks Examined  27    Lymphatic and / or Vascular Invasion  Present    Dermal Lymphovascular Invasion  Not identified    Microcalcifications  Present in non-neoplastic tissue    Treatment Effect in the Breast  No known presurgical therapy    MARGINS   Margin Status for Invasive Carcinoma  All margins negative for invasive carcinoma    Distance from Invasive Carcinoma to Closest Margin  Greater than: 15 mm   Closest Margin(s) to Invasive Carcinoma  All margins    Margin Status for DCIS  All margins negative for DCIS    Distance from DCIS to Closest Margin  Greater than: 15 mm   Closest Margin(s) to DCIS  All margins    REGIONAL LYMPH NODES   Regional Lymph Node Status  Tumor present in regional lymph node(s)    Number of Lymph Nodes with Micrometastases  1    Size of Largest [place] Metastatic Deposit  1.8 mm   Extranodal Extension  Not identified    Total Number of Lymph Nodes Examined (sentinel and non-sentinel)  5    Number of [place] Nodes Examined  4    pTNM CLASSIFICATION (AJCC 8th Edition)   Reporting of pT, pN, and (when applicable) pM categories is based on information available to the pathologist at the time the report is issued. As per the AJCC (Chapter 1, 8th Ed.) it is the managing physician’s responsibility to establish the final pathologic stage based upon all pertinent information, including but potentially not limited to this pathology report.   pT Category  pT1c    pN Category  pN1mi    .              Component   3/30/23     Pathologic Diagnosis   Right breast, 10:00, 5 cm from nipple, twirl clip; needle biopsy:   -Invasive ductal carcinoma with mucinous features, grade 1, 0.9 cm in greatest extent.  -ER: 100% (strong)  -WI: 70% (strong)  -HER2/rafael: Negative/0  -Ki-67: 5%  -Focal ductal carcinoma in situ, nuclear grade 1, cribriform subtype.  (see comment)              IMPRESSION:CT C/A/P   No evidence of metastatic disease or acute pathology in the chest, abdomen  and pelvis.        IMPRESSION: bone scan  No evidence of skeletal metastases.    ASSESSMENT/PLAN    1. Malignant neoplasm of upper-outer quadrant of right breast in female, estrogen receptor positive (CMD)  ER 95 WI 25 HER2 0 KI 15-20%  MRI breasts showed multifocal disease  S/p rt mastectomy 4/27/23  Tic n1 whit mo  oncotype DX 10  Start Adjuvant Arimidex x 5 yrs     2. Bone health  dexa nl  Continue with ca with D      PLAN  Discussed with the patient at length the pathology report indicating mucinous carcinoma grade 1 multifocal and 1 intramammary lymph node showed micrometastatic disease with lymphovascular invasion and the margins were clear.  Her ER and WI was positive and HER2 negative and will benefit from adjuvant endocrine therapy.  I discussed with her the side effects of AI including hot flashes arthralgias and osteopenia.  She is scheduled for a DEXA scan on 5/12 for baseline.  Her Oncotype DX was 10 and will not need any adjuvant chemotherapy and she will be seen by radiation oncology to discuss any role for adjuvant radiation.  She was advised to start anastrozole along with calcium and vitamin D supplements and will return back for follow-up in 2 months  7/5/23  D/w the pt to continue with arimidex and ca with D  To get lt mmg in dec  F/u with dr Davalos for recurrent  Seroma   dexa scan is nl  11/6/23  D/w the pt to continue with arimidex x 5 yrs  Proceed with mmg in dec   Continue with weights for exercises  Continue with ca and vit D    3/7/24  D/w the pt  to proceed with the lt breast us  in July as she had multiple nodes which were enlarged on the previous one which could be a flu shot 3 days  prior to the mmg   Continue with arimidex and ca with D and core exercises  Last dexa was nl    7/30/24  D/w the pt to continue with arimidex and reviewed her recent US which was neg   Continue with ca with D  Her last dexa scan is nl       ORDERS  Orders Placed This Encounter   • ONCOLOGY PATHWAY DECISION   • anastrozole (Arimidex) 1 MG tablet           FOLLOW UP  Return in about 6 months (around 1/30/2025).      Learning needs assessed. Learning intervention  WAS NOT required.    Above plan was discussed with patient and family and all pertinent questions were answered. At the end of the evaluation, the patient was asked if all complaints had been addressed today to her satisfaction and she responded affirmatively.      Thank you for allowing me to participate in your patient's healthcare management. Please feel free to contact me with any questions.    Sincerely,    Sandy Mohan MD

## 2025-02-19 ENCOUNTER — PATIENT OUTREACH (OUTPATIENT)
Dept: CARE COORDINATION | Facility: CLINIC | Age: 80
End: 2025-02-19
Payer: COMMERCIAL

## 2025-03-20 ENCOUNTER — LAB REQUISITION (OUTPATIENT)
Dept: LAB | Facility: CLINIC | Age: 80
End: 2025-03-20
Payer: COMMERCIAL

## 2025-03-20 DIAGNOSIS — D50.9 IRON DEFICIENCY ANEMIA, UNSPECIFIED: ICD-10-CM

## 2025-03-20 PROCEDURE — 82728 ASSAY OF FERRITIN: CPT | Mod: ORL | Performed by: FAMILY MEDICINE

## 2025-03-21 LAB — FERRITIN SERPL-MCNC: 20 NG/ML (ref 11–328)

## 2025-06-20 ENCOUNTER — LAB REQUISITION (OUTPATIENT)
Dept: LAB | Facility: CLINIC | Age: 80
End: 2025-06-20
Payer: COMMERCIAL

## 2025-06-20 DIAGNOSIS — D50.9 IRON DEFICIENCY ANEMIA, UNSPECIFIED: ICD-10-CM

## 2025-06-20 DIAGNOSIS — M81.0 AGE-RELATED OSTEOPOROSIS WITHOUT CURRENT PATHOLOGICAL FRACTURE: ICD-10-CM

## 2025-06-20 DIAGNOSIS — E78.00 PURE HYPERCHOLESTEROLEMIA, UNSPECIFIED: ICD-10-CM

## 2025-06-20 LAB
ALBUMIN SERPL BCG-MCNC: 4 G/DL (ref 3.5–5.2)
ALP SERPL-CCNC: 73 U/L (ref 40–150)
ALT SERPL W P-5'-P-CCNC: 19 U/L (ref 0–50)
ANION GAP SERPL CALCULATED.3IONS-SCNC: 13 MMOL/L (ref 7–15)
AST SERPL W P-5'-P-CCNC: 29 U/L (ref 0–45)
BILIRUB SERPL-MCNC: 0.2 MG/DL
BUN SERPL-MCNC: 20.1 MG/DL (ref 8–23)
CALCIUM SERPL-MCNC: 9.9 MG/DL (ref 8.8–10.4)
CHLORIDE SERPL-SCNC: 100 MMOL/L (ref 98–107)
CHOLEST SERPL-MCNC: 165 MG/DL
CREAT SERPL-MCNC: 0.97 MG/DL (ref 0.51–0.95)
EGFRCR SERPLBLD CKD-EPI 2021: 59 ML/MIN/1.73M2
FASTING STATUS PATIENT QL REPORTED: YES
FASTING STATUS PATIENT QL REPORTED: YES
FERRITIN SERPL-MCNC: 25 NG/ML (ref 11–328)
GLUCOSE SERPL-MCNC: 104 MG/DL (ref 70–99)
HCO3 SERPL-SCNC: 27 MMOL/L (ref 22–29)
HDLC SERPL-MCNC: 49 MG/DL
IRON BINDING CAPACITY (ROCHE): 365 UG/DL (ref 240–430)
IRON SATN MFR SERPL: 8 % (ref 15–46)
IRON SERPL-MCNC: 29 UG/DL (ref 37–145)
LDLC SERPL CALC-MCNC: 98 MG/DL
NONHDLC SERPL-MCNC: 116 MG/DL
POTASSIUM SERPL-SCNC: 4.5 MMOL/L (ref 3.4–5.3)
PROT SERPL-MCNC: 7.1 G/DL (ref 6.4–8.3)
SODIUM SERPL-SCNC: 140 MMOL/L (ref 135–145)
TRIGL SERPL-MCNC: 89 MG/DL

## 2025-06-20 PROCEDURE — 80061 LIPID PANEL: CPT | Mod: ORL | Performed by: FAMILY MEDICINE

## 2025-06-20 PROCEDURE — 80053 COMPREHEN METABOLIC PANEL: CPT | Mod: ORL | Performed by: FAMILY MEDICINE

## 2025-06-20 PROCEDURE — 83540 ASSAY OF IRON: CPT | Mod: ORL | Performed by: FAMILY MEDICINE

## 2025-06-20 PROCEDURE — 82728 ASSAY OF FERRITIN: CPT | Mod: ORL | Performed by: FAMILY MEDICINE

## 2025-07-18 ENCOUNTER — LAB REQUISITION (OUTPATIENT)
Dept: LAB | Facility: CLINIC | Age: 80
End: 2025-07-18
Payer: COMMERCIAL

## 2025-07-18 DIAGNOSIS — J02.9 ACUTE PHARYNGITIS, UNSPECIFIED: ICD-10-CM

## 2025-07-18 PROCEDURE — 87081 CULTURE SCREEN ONLY: CPT | Mod: ORL | Performed by: STUDENT IN AN ORGANIZED HEALTH CARE EDUCATION/TRAINING PROGRAM

## 2025-07-20 LAB — BACTERIA SPEC CULT: NORMAL

## 2025-08-09 ENCOUNTER — HEALTH MAINTENANCE LETTER (OUTPATIENT)
Age: 80
End: 2025-08-09

## 2025-08-18 ENCOUNTER — ALLIED HEALTH/NURSE VISIT (OUTPATIENT)
Dept: FAMILY MEDICINE | Facility: CLINIC | Age: 80
End: 2025-08-18
Payer: COMMERCIAL

## 2025-08-18 ENCOUNTER — ANCILLARY PROCEDURE (OUTPATIENT)
Dept: BONE DENSITY | Facility: CLINIC | Age: 80
End: 2025-08-18
Attending: INTERNAL MEDICINE
Payer: COMMERCIAL

## 2025-08-18 DIAGNOSIS — M81.0 SENILE OSTEOPOROSIS: Primary | ICD-10-CM

## 2025-08-18 DIAGNOSIS — M81.0 SENILE OSTEOPOROSIS: ICD-10-CM

## 2025-08-18 PROCEDURE — 99207 PR NO CHARGE NURSE ONLY: CPT

## 2025-08-18 PROCEDURE — 77080 DXA BONE DENSITY AXIAL: CPT | Mod: TC | Performed by: PHYSICIAN ASSISTANT

## 2025-08-18 PROCEDURE — 96372 THER/PROPH/DIAG INJ SC/IM: CPT | Performed by: INTERNAL MEDICINE

## 2025-08-18 PROCEDURE — 77081 DXA BONE DENSITY APPENDICULR: CPT | Mod: TC | Performed by: PHYSICIAN ASSISTANT

## (undated) DEVICE — SPONGE NEURO 1 X 1 WECK 200101

## (undated) DEVICE — POSITIONER ARM CRADLE LAMINECTOMY DISP

## (undated) DEVICE — DRAPE C-ARM 60X42" 1013

## (undated) DEVICE — GOWN IMPERVIOUS BREATHABLE 2XL/XLONG

## (undated) DEVICE — DRAPE BACK TABLE PADDED 60X90

## (undated) DEVICE — GLOVE BIOGEL PI ORTHOPRO SZ 8.5 47685

## (undated) DEVICE — ESU ELEC BLADE 2.75" COATED/INSULATED E1455

## (undated) DEVICE — GLOVE SURG PI ULTRA TOUCH M SZ 8-1/2 LF

## (undated) DEVICE — GLOVE BIOGEL PI INDICATOR 9.0 LF  41690

## (undated) DEVICE — CUSTOM PACK LUMBAR FUSION SNE5BLFHEA

## (undated) DEVICE — DRAPE SHEET REV FOLD 3/4 9349

## (undated) DEVICE — SYR 05ML LL W/O NDL

## (undated) DEVICE — DRAPE STERI TOWEL LG 1010

## (undated) DEVICE — SU ETHILON 3-0 PSL 30" 1691H

## (undated) DEVICE — DRSG STERI STRIP 1/2X4" R1547

## (undated) DEVICE — SU NUROLON 4-0 TF CR 8X18" C584D

## (undated) DEVICE — SUCTION MANIFOLD NEPTUNE 2 SYS 1 PORT 702-025-000

## (undated) DEVICE — WRAP B-COOL TERI LUMBAR 08143380

## (undated) DEVICE — ELECTRODE PATIENT RETURN ADULT L10 FT 2 PLATE CORD 0855C

## (undated) DEVICE — SUTURE VICRYL+ 2-0 CT-2 27" UND VCP269H

## (undated) DEVICE — SOL NACL 0.9% IRRIG 1000ML BOTTLE 2F7124

## (undated) DEVICE — GLOVE UNDER INDICATOR PI SZ 8.5 LF 41685

## (undated) DEVICE — SPONGE KITNER DISSECTING 7102*

## (undated) DEVICE — PACK 9X6IN THRP HOT COLD CMPR  MED GEL 80104

## (undated) DEVICE — ADH LIQUID MASTISOL TOPICAL VIAL 2-3ML 0523-48

## (undated) DEVICE — PACK MINOR SINGLE BASIN SSK3001

## (undated) DEVICE — CATH IV 14GA 2IN REM FLASHPLUG DEHP-FR PVC FR 4251717-02

## (undated) DEVICE — ESU PENCIL SMOKE EVAC W/ROCKER SWITCH 0703-047-000

## (undated) DEVICE — SOL WATER IRRIG 1000ML BOTTLE 2F7114

## (undated) DEVICE — DRSG PRIMAPORE 03 1/8X6" 66000318

## (undated) DEVICE — SYR BULB IRRIG DOVER 60 ML LATEX FREE 67000

## (undated) DEVICE — RX SURGIFLO HEMOSTATIC MATRIX 8ML 2991

## (undated) DEVICE — RX VISTASEAL FIBRIN SEALANT W/THROMBIN 10ML VST10

## (undated) DEVICE — SPONGE NEURO 1/2X1/2 WECK 200100

## (undated) DEVICE — CUSHION INSERT LG PRONE VIEW JACKSON TABLE

## (undated) DEVICE — TOOL DISSECT MIDAS MR8 14CM MATCH HEAD 3MM MR8-14MH30

## (undated) DEVICE — SU STRATAFIX PDS PLUS 1 CT-1 18" SXPP1A404

## (undated) DEVICE — CATH TRAY FOLEY SURESTEP 16FR DRAIN BAG STATOCK A899916

## (undated) DEVICE — Device

## (undated) DEVICE — SU STRATAFIX MONOCRYL 3-0 SPIRAL PS-2 30CM SXMP1B106

## (undated) DEVICE — WARMER SCOPE DISPOSABLE DLW510

## (undated) DEVICE — CELL SAVER

## (undated) RX ORDER — FENTANYL CITRATE 50 UG/ML
INJECTION, SOLUTION INTRAMUSCULAR; INTRAVENOUS
Status: DISPENSED
Start: 2024-12-02

## (undated) RX ORDER — LIDOCAINE HYDROCHLORIDE 10 MG/ML
INJECTION, SOLUTION EPIDURAL; INFILTRATION; INTRACAUDAL; PERINEURAL
Status: DISPENSED
Start: 2024-12-30

## (undated) RX ORDER — DEXAMETHASONE SODIUM PHOSPHATE 4 MG/ML
INJECTION, SOLUTION INTRA-ARTICULAR; INTRALESIONAL; INTRAMUSCULAR; INTRAVENOUS; SOFT TISSUE
Status: DISPENSED
Start: 2024-12-02

## (undated) RX ORDER — ONDANSETRON 2 MG/ML
INJECTION INTRAMUSCULAR; INTRAVENOUS
Status: DISPENSED
Start: 2024-12-30

## (undated) RX ORDER — LIDOCAINE HYDROCHLORIDE 10 MG/ML
INJECTION, SOLUTION EPIDURAL; INFILTRATION; INTRACAUDAL; PERINEURAL
Status: DISPENSED
Start: 2024-12-02

## (undated) RX ORDER — FENTANYL CITRATE 50 UG/ML
INJECTION, SOLUTION INTRAMUSCULAR; INTRAVENOUS
Status: DISPENSED
Start: 2024-12-30

## (undated) RX ORDER — EPHEDRINE SULFATE 50 MG/ML
INJECTION, SOLUTION INTRAMUSCULAR; INTRAVENOUS; SUBCUTANEOUS
Status: DISPENSED
Start: 2024-12-30

## (undated) RX ORDER — DEXAMETHASONE SODIUM PHOSPHATE 4 MG/ML
INJECTION, SOLUTION INTRA-ARTICULAR; INTRALESIONAL; INTRAMUSCULAR; INTRAVENOUS; SOFT TISSUE
Status: DISPENSED
Start: 2024-12-30

## (undated) RX ORDER — PROPOFOL 10 MG/ML
INJECTION, EMULSION INTRAVENOUS
Status: DISPENSED
Start: 2024-12-02

## (undated) RX ORDER — ONDANSETRON 2 MG/ML
INJECTION INTRAMUSCULAR; INTRAVENOUS
Status: DISPENSED
Start: 2024-12-02

## (undated) RX ORDER — PROPOFOL 10 MG/ML
INJECTION, EMULSION INTRAVENOUS
Status: DISPENSED
Start: 2024-12-30